# Patient Record
Sex: FEMALE | Race: WHITE | NOT HISPANIC OR LATINO | Employment: FULL TIME | URBAN - METROPOLITAN AREA
[De-identification: names, ages, dates, MRNs, and addresses within clinical notes are randomized per-mention and may not be internally consistent; named-entity substitution may affect disease eponyms.]

---

## 2017-02-25 ENCOUNTER — APPOINTMENT (OUTPATIENT)
Dept: GENERAL RADIOLOGY | Facility: CLINIC | Age: 27
End: 2017-02-25
Attending: EMERGENCY MEDICINE
Payer: MEDICAID

## 2017-02-25 ENCOUNTER — HOSPITAL ENCOUNTER (EMERGENCY)
Facility: CLINIC | Age: 27
Discharge: HOME | End: 2017-02-25
Attending: EMERGENCY MEDICINE | Admitting: EMERGENCY MEDICINE
Payer: MEDICAID

## 2017-02-25 VITALS
TEMPERATURE: 97.7 F | RESPIRATION RATE: 16 BRPM | WEIGHT: 180 LBS | DIASTOLIC BLOOD PRESSURE: 79 MMHG | OXYGEN SATURATION: 100 % | HEIGHT: 60 IN | BODY MASS INDEX: 35.34 KG/M2 | SYSTOLIC BLOOD PRESSURE: 146 MMHG

## 2017-02-25 DIAGNOSIS — M25.511 CHRONIC RIGHT SHOULDER PAIN: ICD-10-CM

## 2017-02-25 DIAGNOSIS — G89.29 CHRONIC RIGHT SHOULDER PAIN: ICD-10-CM

## 2017-02-25 PROCEDURE — 25000132 ZZH RX MED GY IP 250 OP 250 PS 637: Performed by: EMERGENCY MEDICINE

## 2017-02-25 PROCEDURE — 99283 EMERGENCY DEPT VISIT LOW MDM: CPT

## 2017-02-25 PROCEDURE — 73030 X-RAY EXAM OF SHOULDER: CPT | Mod: RT

## 2017-02-25 RX ORDER — TRAMADOL HYDROCHLORIDE 50 MG/1
50 TABLET ORAL ONCE
Status: COMPLETED | OUTPATIENT
Start: 2017-02-25 | End: 2017-02-25

## 2017-02-25 RX ORDER — LIDOCAINE 50 MG/G
PATCH TOPICAL
Qty: 10 PATCH | Refills: 0 | Status: SHIPPED | OUTPATIENT
Start: 2017-02-25 | End: 2019-03-25

## 2017-02-25 RX ADMIN — TRAMADOL HYDROCHLORIDE 50 MG: 50 TABLET, COATED ORAL at 07:59

## 2017-02-25 NOTE — LETTER
EMERGENCY DEPARTMENT  [address] [facility] [address]  [phone]    2017    Ashley Chauhan  [address]  [facility] [place] [address]  [phone] (home)     : 1990      To Whom it may concern:    Ashley Chauhan was seen in our Emergency Department today, 2017.      Sincerely,        Chivo Stevens

## 2017-02-25 NOTE — ED PROVIDER NOTES
History     Chief Complaint:  Shoulder pain    HPI   Ashley Chauhan is a 26 year old female with a history of chronic shoulder pain who presents with shoulder pain. The patient states that she has experienced right shoulder pain for a long time and has underwent physical therapy and received cortisone shots in the past with the last being in . Last night, the patient's shoulder pain became worse. She describes the pain as a shooting pain that radiates down her arm and into her neck. She states that the pain becomes worse when she begins her menstrual cycle and the patient just began her menstrual cycle this morning. She has been taking ibuprofen for the pain with the last dose at 0500. She has undergone imaging of the shoulder in the past. I looked the patient up in the [facility] database and did not find anything. The patient admits to marijuana use, but denies any other illicit drugs. No urinary or bowel incontinence. No left sided symptoms, other joint involvement, nor lower extremity pain.    Allergies:  Aspirin  Amoxicillin  Lortab  Penicillin    Medications:    Multivitamin  Valtrex    Past Medical History:    Chronic shoulder pain  Endometriosis    Past Surgical History:     section, laparoscopic cystectomy    Family History:    History reviewed. No pertinent family history.    Social History:  Marital Status:   Presents to the ED with a friend  Tobacco Use: 0.25 packs/day  Alcohol Use: positive     Review of Systems   Musculoskeletal:        Positive for right shoulder pain   All other systems reviewed and are negative.    Physical Exam   First Vitals:  BP: 146/79  Heart Rate: 109  Temp: 97.7  F (36.5  C)  Resp: 16  Height: 152.4 cm (5')  Weight: 81.6 kg (180 lb)  SpO2: 100 %    Physical Exam  General:  Well-nourished  Speaking in full sentences  Resting comfortably on gurney  Eyes:  Conjunctiva without injection or scleral icterus  PERRL  ENT:  Moist mucous membranes  Posterior  oropharynx clear without erythema or exudate  Nares patent  Pinnae normal  Neck:  Full ROM  No stiffness appreciated  Tenderness to palpation to R trapezius musculature  Resp:  Lungs CTAB  No crackles, wheezing or audible rubs  Good air movement  CV:   Normal rate, regular rhythm  S1 and S2 present  No murmur, gallop or rub  GI:  BS present  Abdomen soft without distention  Non-tender to light and deep palpation  No guarding or rebound tenderness  Skin:  Warm, dry, well perfused  No rashes or open wounds on exposed skin  No warmth, swelling, or redness to tissue about shoulder girdle  MSK:  No deformity noted about R shoulder  Able to flex/extend at shoulder, as well as internally/externally rotate  No limitation to flexion/extension at elbow  Extremity warm, well-perfused  2+ radial pulse  Cap refill <3 sec distally  Neuro:  Alert  4/5  strength on RUE vs LUE (chronic per patient)  Diminished sensation to RUE vs LUE (chronic per patient)  Answers questions appropriately  Gait stable  Psych:  Normal affect, normal mood    Emergency Department Course   Imaging:  Radiographic findings were communicated with the patient who voiced understanding of the findings.    Shoulder XR, G/E 3 views, right  No bony or soft tissue abnormalities.  Report per radiology.    Interventions:  (0759) Tramadol, 50 mg, PO    Emergency Department Course:  Nursing notes and vitals reviewed.  I performed an exam of the patient as documented above.   The patient was sent for a shoulder XR while in the emergency department, findings above.   (0903) I rechecked the patient and updated her on her results. The patient is feeling better.  Findings and plan explained to the patient. Patient discharged home with instructions regarding supportive care, medications, and reasons to return. The importance of close follow-up was reviewed. The patient was prescribed Lidoderm patches.    Impression & Plan    Medical Decision Making:  Sadie Chauhan is  a 26 year old female presenting to the emergency department for evaluation of right sided shoulder pain. Her vital signs on presentation reveal elevated blood pressure and heart rate though otherwise are grossly unremarkable. History is significant for pain to this location occurring regularly for the past 4 years, although exacerbated with occurrence of menstrual cycle. No recent history of trauma. X-ray negative for fracture, nor dislocation. No evidence of vascular compromise distally and no signs of compartment syndrome. She does have slight diminished  strength and sensation to the right upper extremity, although this is chronic and unchanged. Of note, she does have association of pain with menstrual cycle. Etiology of this not entirely clear. No current signs to suggest septic arthritis. No overlying skin changes consistent with cellulitis. There may be a component of referred cervical radiculopathy given some tenderness to palpation over the trapezius musculature. As this has been an ongoing issue for 4 years I do not feel that emergent MRI is indicated. No other signs or symptoms consistent with cord compression such as bowel or bladder incontinence, nor lower extremity weakness. Results of the above studies discussed with the patient. She was provided the above analgesia with improvements in symptoms. Clinical recommendations for further evaluation were discussed including referral to orthopedics, as well as ob/gyn. She was given a Lidoderm patch to assist with pain control and also referred to physical therapy. She is welcome to return to the ER at any point with worsening symptoms, trouble breathing, chest pain, abdominal pain, or any other concerns. All questions answered prior to discharge.    Diagnosis:    ICD-10-CM    1. Chronic right shoulder pain M25.511     G89.29        Disposition:  Discharge to home    Discharge Medications:  New Prescriptions    LIDOCAINE (LIDODERM) 5 % PATCH    Apply 1  patch to painful area for up to 12 h within a 24 h period.  Remove after 12 hours.       I, Joshua Jiang, am serving as a scribe on 2/25/2017 at 7:27 AM to personally document services performed by Dr. Stevens based on my observations and the provider's statements to me.        Chivo Stevens MD  02/25/17 0919

## 2017-02-25 NOTE — DISCHARGE INSTRUCTIONS
"Follow-up:  Please follow-up with [facility] in 3-5 days for re-evaluation and discussion of your visit to the emergency department today.    Home treatments:  Recommended home therapies include rest, ice, limited use of right shoulder, avoidance of heavy lifting, lidoderm patches, and physical therapy.    New prescriptions:  Lidoderm patch    Return precautions:  Warning signs which should prompt you to return to the ER include worsening pain, discoloration of your arm, or any other new or troubling symptoms.  We are always happy to see you again.        Neck/Back Pain: General    Both neck and back pain are usually caused by injury to the muscles or ligaments of the spine. Sometimes the disks that separate each bone of the spine may cause pain by pressing on a nearby nerve. Back and neck pain may appear after a sudden twisting/bending force (such as in a car accident), or sometimes after a simple awkward movement. In either case, muscle spasm is often present and adds to the pain.  Acute neck and back pain usually gets better in 1 to 2 weeks. Pain related to disk disease, arthritis in the spinal joints or spinal stenosis (narrowing of the spinal canal) can become chronic and last for months or years.  Back and neck pain are common problems. Most people feel better in 1 or 2 weeks, and most of the rest in 1 to 2 months. Most people can remain active.  Symptoms  People experience and describe pain differently.    Pain can be sharp, stabbing, shooting, aching, cramping, or burning    Movement, standing, bending, lifting, sitting, or walking may worsen the pain    Pain can be localized to one spot or area, or it can be more generalized    Pain can spread or radiate upwards, downwards, to the front, or go down your arms    Muscle spasm may occur.  Cause  Most of the time \"mechanical problems\" with the muscles or spine cause the pain. it is usually caused by an injury, whether known or not, to the " muscles or ligaments. While illnesses can cause back pain, it is usually not caused by a serious illness. Pain is usually related to physical activity, whether sports, exercise, work, or normal activity. Sometimes it can occur without an identifiable cause. This can happen simply by stretching or moving wrong, without noting pain at the time. Other causes include:    Overexertion, lifting, pushing, pulling incorrectly or too aggressively.    Sudden twisting, bending or stretching from an accident (car or fall), or accidental movement.    Poor posture    Poor conditioning, lack of regular exercise    Spinal disc disease or arthritis    Stress    Pregnancy, or illness like appendicitis, bladder or kidney infection, pelvic infections   Home care    For neck pain: Use a comfortable pillow that supports the head and keeps the spine in a neutral position. The position of the head should not be tilted forward or backward.    When in bed, try to find a position of comfort. A firm mattress is best. Try lying flat on your back with pillows under your knees. You can also try lying on your side with your knees bent up towards your chest and a pillow between your knees.    At first, do not try to stretch out the sore spots. If there is a strain, it is not like the good soreness you get after exercising without an injury. In this case, stretching may make it worse.    Avoid prolonged sitting, long car rides or travel. This puts more stress on the lower back than standing or walking.    During the first 24 to 72 hours after an injury, apply an ice pack to the painful area for 20 minutes and then remove it for 20 minutes over a period of 60 to 90 minutes or several times a day. As a safety precaution, do not use a heating pad at bedtime. Sleeping with a heating pad can lead to skin burns or tissue damage.    Ice and heat therapies can be alternated. Talk with your health care provider about the best treatment for your back or neck  pain.    Therapeutic massage can help relax the back and neck muscles without stretching them.    Be aware of safe lifting methods and do not lift anything over 15 pounds until all the pain is gone.  Medications  Talk to your health care provider before using medications, especially if you have other medical problems or are taking other medicines.    You may use acetaminophen (such as Tylenol) or ibuprofen (such as Advil or Motrin) to control pain, unless another pain medicine was prescribed. If you have chronic conditions like diabetes, liver or kidney disease, stomach ulcers,  gastrointestinal bleeding, or are taking blood thinner medications.    Be careful if you are given pain medicines, narcotics, or medication for muscle spasm. They can cause drowsiness, and can affect your coordination, reflexes, and judgment. Do not drive or operate heavy machinery.  Follow-up care  Follow up with your health care provider if your symptoms do not start to improve after one week. Physical therapy or further tests may be needed.  If X-rays were taken, they will be reviewed by a radiologist. You will be notified of any new findings that may affect your care.  Call 911  Seek emergency medical care if any of the following occur:    Trouble breathing    Confusion    Very drowsy or trouble awakening    Fainting or loss of consciousness    Rapid or very slow heart rate    Loss of bowel or bladder control  When to seek medical care  Get prompt medical attention if any of the following occur:    Pain becomes worse or spreads into your arms or legs    Weakness, numbness or pain in one or both arms or legs    Numbness in the groin area    Difficulty walking    Fever of 100.4 F (38 C) or higher, or as directed by your healthcare provider    4714-2319 [employer]. [address] [place] [address], [place] [address]. All rights reserved. This information is not intended as a substitute for professional medical care. Always follow your  healthcare professional's instructions.

## 2017-02-25 NOTE — ED AVS SNAPSHOT
Emergency Department    [address] [facility] [address]    Phone:  [phone]    Fax:  [phone]                                       Ashley Chauhan   MRN: [id number]    Department:   Emergency Department   Date of Visit:  2/25/2017           Patient Information     Date Of Birth          1990        Your diagnoses for this visit were:     Chronic right shoulder pain        You were seen by Chivo Stevens MD.      Follow-up Information     Follow up with [facility]. Schedule an appointment as soon as possible for a visit in 3 days.    Contact information:    [address] [place] [address]  [address]  [facility] [phone]        Follow up with [facility]. Schedule an appointment as soon as possible for a visit in 3 days.    Contact information:    [address] [place] [phone]        Follow up with  Emergency Department.    Specialty:  EMERGENCY MEDICINE    Why:  If symptoms worsen    Contact information:    [address] [facility] [address] [phone]        Follow up with [facility]. Schedule an appointment as soon as possible for a visit in 1 week.    Specialty:  Physical Therapy    Contact information:    [address] [facility] [address]  [facility] [address] [phone]        Discharge Instructions       Follow-up:  Please follow-up with [facility] in 3-5 days for re-evaluation and discussion of your visit to the emergency department today.    Home treatments:  Recommended home therapies include rest, ice, limited use of right shoulder, avoidance of heavy lifting, lidoderm patches, and physical therapy.    New prescriptions:  Lidoderm patch    Return precautions:  Warning signs which should prompt you to return to the ER include worsening pain, discoloration of your arm, or any other new or troubling symptoms.   "We are always happy to see you again.        Neck/Back Pain: General    Both neck and back pain are usually caused by injury to the muscles or ligaments of the spine. Sometimes the disks that separate each bone of the spine may cause pain by pressing on a nearby nerve. Back and neck pain may appear after a sudden twisting/bending force (such as in a car accident), or sometimes after a simple awkward movement. In either case, muscle spasm is often present and adds to the pain.  Acute neck and back pain usually gets better in 1 to 2 weeks. Pain related to disk disease, arthritis in the spinal joints or spinal stenosis (narrowing of the spinal canal) can become chronic and last for months or years.  Back and neck pain are common problems. Most people feel better in 1 or 2 weeks, and most of the rest in 1 to 2 months. Most people can remain active.  Symptoms  People experience and describe pain differently.    Pain can be sharp, stabbing, shooting, aching, cramping, or burning    Movement, standing, bending, lifting, sitting, or walking may worsen the pain    Pain can be localized to one spot or area, or it can be more generalized    Pain can spread or radiate upwards, downwards, to the front, or go down your arms    Muscle spasm may occur.  Cause  Most of the time \"mechanical problems\" with the muscles or spine cause the pain. it is usually caused by an injury, whether known or not, to the muscles or ligaments. While illnesses can cause back pain, it is usually not caused by a serious illness. Pain is usually related to physical activity, whether sports, exercise, work, or normal activity. Sometimes it can occur without an identifiable cause. This can happen simply by stretching or moving wrong, without noting pain at the time. Other causes include:    Overexertion, lifting, pushing, pulling incorrectly or too aggressively.    Sudden twisting, bending or stretching from an accident (car or fall), or accidental " movement.    Poor posture    Poor conditioning, lack of regular exercise    Spinal disc disease or arthritis    Stress    Pregnancy, or illness like appendicitis, bladder or kidney infection, pelvic infections   Home care    For neck pain: Use a comfortable pillow that supports the head and keeps the spine in a neutral position. The position of the head should not be tilted forward or backward.    When in bed, try to find a position of comfort. A firm mattress is best. Try lying flat on your back with pillows under your knees. You can also try lying on your side with your knees bent up towards your chest and a pillow between your knees.    At first, do not try to stretch out the sore spots. If there is a strain, it is not like the good soreness you get after exercising without an injury. In this case, stretching may make it worse.    Avoid prolonged sitting, long car rides or travel. This puts more stress on the lower back than standing or walking.    During the first 24 to 72 hours after an injury, apply an ice pack to the painful area for 20 minutes and then remove it for 20 minutes over a period of 60 to 90 minutes or several times a day. As a safety precaution, do not use a heating pad at bedtime. Sleeping with a heating pad can lead to skin burns or tissue damage.    Ice and heat therapies can be alternated. Talk with your health care provider about the best treatment for your back or neck pain.    Therapeutic massage can help relax the back and neck muscles without stretching them.    Be aware of safe lifting methods and do not lift anything over 15 pounds until all the pain is gone.  Medications  Talk to your health care provider before using medications, especially if you have other medical problems or are taking other medicines.    You may use acetaminophen (such as Tylenol) or ibuprofen (such as Advil or Motrin) to control pain, unless another pain medicine was prescribed. If you have chronic conditions  like diabetes, liver or kidney disease, stomach ulcers,  gastrointestinal bleeding, or are taking blood thinner medications.    Be careful if you are given pain medicines, narcotics, or medication for muscle spasm. They can cause drowsiness, and can affect your coordination, reflexes, and judgment. Do not drive or operate heavy machinery.  Follow-up care  Follow up with your health care provider if your symptoms do not start to improve after one week. Physical therapy or further tests may be needed.  If X-rays were taken, they will be reviewed by a radiologist. You will be notified of any new findings that may affect your care.  Call [phone]  Seek emergency medical care if any of the following occur:    Trouble breathing    Confusion    Very drowsy or trouble awakening    Fainting or loss of consciousness    Rapid or very slow heart rate    Loss of bowel or bladder control  When to seek medical care  Get prompt medical attention if any of the following occur:    Pain becomes worse or spreads into your arms or legs    Weakness, numbness or pain in one or both arms or legs    Numbness in the groin area    Difficulty walking    Fever of 100.4 F (38 C) or higher, or as directed by your healthcare provider    9197-5509 [employer]. [address] [place] [address]. All rights reserved. This information is not intended as a substitute for professional medical care. Always follow your healthcare professional's instructions.              24 Hour Appointment Hotline       To make an appointment at any [facility], call [phone] ([phone]). If you don't have a family doctor or clinic, we will help you find one. [facility] clinics are conveniently located to serve the needs of you and your family.             Review of your medicines      START taking        Dose / Directions Last dose taken    lidocaine 5 % Patch   Commonly known as:  LIDODERM   Quantity:  10 patch        Apply 1 patch to  painful area for up to 12 h within a 24 h period.  Remove after 12 hours.   Refills:  0          Our records show that you are taking the medicines listed below. If these are incorrect, please call your family doctor or clinic.        Dose / Directions Last dose taken    FLINSTONES GUMMIES OMEGA-3 DHA Chew        Refills:  0        UNABLE TO FIND        MEDICATION NAME: Oral contraceptive-unknown name   Refills:  0        valACYclovir 1000 mg tablet   Commonly known as:  VALTREX   Dose:  1000 mg   Quantity:  21 tablet        Take 1 tablet (1,000 mg) by mouth 3 times daily for 7 days   Refills:  0                Prescriptions were sent or printed at these locations (1 Prescription)                   Other Prescriptions                Printed at Department/Unit printer (1 of 1)         lidocaine (LIDODERM) 5 % Patch                Procedures and tests performed during your visit     Shoulder XR, G/E 3 views, right      Orders Needing Specimen Collection     None      Pending Results     No orders found from 2/23/2017 to 2/26/2017.            Pending Culture Results     No orders found from 2/23/2017 to 2/26/2017.             Test Results from your hospital stay     2/25/2017  8:25 AM - Interface, Radiant Ib      Narrative     XR SHOULDER RT G/E 3 VW 2/25/2017 7:59 AM     HISTORY: Shoulder pain.    COMPARISON: None.        Impression     IMPRESSION: No bony or soft tissue abnormalities.    PURVI OVALLE MD                Clinical Quality Measure: Blood Pressure Screening     Your blood pressure was checked while you were in the emergency department today. The last reading we obtained was  BP: 146/79 . Please read the guidelines below about what these numbers mean and what you should do about them.  If your systolic blood pressure (the top number) is less than 120 and your diastolic blood pressure (the bottom number) is less than 80, then your blood pressure is normal. There is nothing more that you need to do about  "it.  If your systolic blood pressure (the top number) is 120-139 or your diastolic blood pressure (the bottom number) is 80-89, your blood pressure may be higher than it should be. You should have your blood pressure rechecked within a year by a primary care provider.  If your systolic blood pressure (the top number) is 140 or greater or your diastolic blood pressure (the bottom number) is 90 or greater, you may have high blood pressure. High blood pressure is treatable, but if left untreated over time it can put you at risk for heart attack, stroke, or kidney failure. You should have your blood pressure rechecked by a primary care provider within the next 4 weeks.  If your provider in the emergency department today gave you specific instructions to follow-up with your doctor or provider even sooner than that, you should follow that instruction and not wait for up to 4 weeks for your follow-up visit.        Thank you for choosing [facility]       Thank you for choosing [facility] for your care. Our goal is always to provide you with excellent care. Hearing back from our patients is one way we can continue to improve our services. Please take a few minutes to complete the written survey that you may receive in the mail after you visit with us. Thank you!        [employer] Information     [employer] lets you send messages to your doctor, view your test results, renew your prescriptions, schedule appointments and more. To sign up, go to [email or web address] . Click on \"Log in\" on the left side of the screen, which will take you to the Welcome page. Then click on \"Sign up Now\" on the right side of the page.     You will be asked to enter the access code listed below, as well as some personal information. Please follow the directions to create your username and password.     Your access code is: [email or web address]  Expires: 2017  9:15 AM     Your access code will  in 90 days. If you need help or a new code, please call " your [place] clinic or [phone].        Care EveryWhere ID     This is your Care EveryWhere ID. This could be used by other organizations to access your [facility] medical records  [id number]        After Visit Summary       This is your record. Keep this with you and show to your community pharmacist(s) and doctor(s) at your next visit.

## 2017-03-26 ENCOUNTER — HOSPITAL ENCOUNTER (EMERGENCY)
Facility: CLINIC | Age: 27
Discharge: HOME | End: 2017-03-26
Attending: EMERGENCY MEDICINE | Admitting: EMERGENCY MEDICINE
Payer: MEDICAID

## 2017-03-26 ENCOUNTER — APPOINTMENT (OUTPATIENT)
Dept: GENERAL RADIOLOGY | Facility: CLINIC | Age: 27
End: 2017-03-26
Attending: EMERGENCY MEDICINE
Payer: MEDICAID

## 2017-03-26 VITALS
HEIGHT: 60 IN | SYSTOLIC BLOOD PRESSURE: 128 MMHG | OXYGEN SATURATION: 98 % | RESPIRATION RATE: 15 BRPM | HEART RATE: 60 BPM | DIASTOLIC BLOOD PRESSURE: 82 MMHG | TEMPERATURE: 98.1 F

## 2017-03-26 DIAGNOSIS — J02.0 STREPTOCOCCAL SORE THROAT: ICD-10-CM

## 2017-03-26 LAB
ALBUMIN SERPL-MCNC: 3.7 G/DL (ref 3.4–5)
ALP SERPL-CCNC: 70 U/L (ref 40–150)
ALT SERPL W P-5'-P-CCNC: 19 U/L (ref 0–50)
ANION GAP SERPL CALCULATED.3IONS-SCNC: 12 MMOL/L (ref 3–14)
AST SERPL W P-5'-P-CCNC: 21 U/L (ref 0–45)
BILIRUB SERPL-MCNC: 0.5 MG/DL (ref 0.2–1.3)
BUN SERPL-MCNC: 10 MG/DL (ref 7–30)
CALCIUM SERPL-MCNC: 8.8 MG/DL (ref 8.5–10.1)
CHLORIDE SERPL-SCNC: 107 MMOL/L (ref 94–109)
CO2 SERPL-SCNC: 22 MMOL/L (ref 20–32)
CREAT SERPL-MCNC: 0.7 MG/DL (ref 0.52–1.04)
DEPRECATED S PYO AG THROAT QL EIA: ABNORMAL
ERYTHROCYTE [DISTWIDTH] IN BLOOD BY AUTOMATED COUNT: 12.6 % (ref 10–15)
FLUAV+FLUBV AG SPEC QL: NEGATIVE
FLUAV+FLUBV AG SPEC QL: NORMAL
GFR SERPL CREATININE-BSD FRML MDRD: NORMAL ML/MIN/1.7M2
GLUCOSE SERPL-MCNC: 73 MG/DL (ref 70–99)
HCG SERPL QL: NEGATIVE
HCT VFR BLD AUTO: 43.7 % (ref 35–47)
HGB BLD-MCNC: 15.6 G/DL (ref 11.7–15.7)
LIPASE SERPL-CCNC: 60 U/L (ref 73–393)
MCH RBC QN AUTO: 32.4 PG (ref 26.5–33)
MCHC RBC AUTO-ENTMCNC: 35.7 G/DL (ref 31.5–36.5)
MCV RBC AUTO: 91 FL (ref 78–100)
MICRO REPORT STATUS: ABNORMAL
PLATELET # BLD AUTO: 174 10E9/L (ref 150–450)
POTASSIUM SERPL-SCNC: 4 MMOL/L (ref 3.4–5.3)
PROT SERPL-MCNC: 7.4 G/DL (ref 6.8–8.8)
RBC # BLD AUTO: 4.81 10E12/L (ref 3.8–5.2)
SODIUM SERPL-SCNC: 141 MMOL/L (ref 133–144)
SPECIMEN SOURCE: ABNORMAL
SPECIMEN SOURCE: NORMAL
WBC # BLD AUTO: 5.4 10E9/L (ref 4–11)

## 2017-03-26 PROCEDURE — 96361 HYDRATE IV INFUSION ADD-ON: CPT

## 2017-03-26 PROCEDURE — 96375 TX/PRO/DX INJ NEW DRUG ADDON: CPT

## 2017-03-26 PROCEDURE — 99284 EMERGENCY DEPT VISIT MOD MDM: CPT | Mod: 25

## 2017-03-26 PROCEDURE — 84703 CHORIONIC GONADOTROPIN ASSAY: CPT | Performed by: EMERGENCY MEDICINE

## 2017-03-26 PROCEDURE — 85027 COMPLETE CBC AUTOMATED: CPT | Performed by: EMERGENCY MEDICINE

## 2017-03-26 PROCEDURE — 87804 INFLUENZA ASSAY W/OPTIC: CPT | Mod: 91 | Performed by: EMERGENCY MEDICINE

## 2017-03-26 PROCEDURE — 96374 THER/PROPH/DIAG INJ IV PUSH: CPT

## 2017-03-26 PROCEDURE — 83690 ASSAY OF LIPASE: CPT | Performed by: EMERGENCY MEDICINE

## 2017-03-26 PROCEDURE — 71020 XR CHEST 2 VW: CPT

## 2017-03-26 PROCEDURE — 80053 COMPREHEN METABOLIC PANEL: CPT | Performed by: EMERGENCY MEDICINE

## 2017-03-26 PROCEDURE — 87880 STREP A ASSAY W/OPTIC: CPT | Performed by: EMERGENCY MEDICINE

## 2017-03-26 PROCEDURE — 25000128 H RX IP 250 OP 636: Performed by: EMERGENCY MEDICINE

## 2017-03-26 RX ORDER — ONDANSETRON 4 MG/1
4 TABLET, ORALLY DISINTEGRATING ORAL EVERY 8 HOURS PRN
Qty: 10 TABLET | Refills: 0 | Status: SHIPPED | OUTPATIENT
Start: 2017-03-26 | End: 2017-03-29

## 2017-03-26 RX ORDER — AZITHROMYCIN 500 MG/1
500 TABLET, FILM COATED ORAL DAILY
Qty: 5 TABLET | Refills: 0 | Status: SHIPPED | OUTPATIENT
Start: 2017-03-26 | End: 2017-03-31

## 2017-03-26 RX ORDER — ONDANSETRON 2 MG/ML
4 INJECTION INTRAMUSCULAR; INTRAVENOUS ONCE
Status: COMPLETED | OUTPATIENT
Start: 2017-03-26 | End: 2017-03-26

## 2017-03-26 RX ORDER — KETOROLAC TROMETHAMINE 15 MG/ML
15 INJECTION, SOLUTION INTRAMUSCULAR; INTRAVENOUS ONCE
Status: COMPLETED | OUTPATIENT
Start: 2017-03-26 | End: 2017-03-26

## 2017-03-26 RX ADMIN — SODIUM CHLORIDE 1000 ML: 9 INJECTION, SOLUTION INTRAVENOUS at 17:41

## 2017-03-26 RX ADMIN — KETOROLAC TROMETHAMINE 15 MG: 15 INJECTION, SOLUTION INTRAMUSCULAR; INTRAVENOUS at 18:19

## 2017-03-26 RX ADMIN — ONDANSETRON 4 MG: 2 SOLUTION INTRAMUSCULAR; INTRAVENOUS at 18:19

## 2017-03-26 ASSESSMENT — ENCOUNTER SYMPTOMS
NAUSEA: 1
VOMITING: 1
DIARRHEA: 1
CHILLS: 1
DIZZINESS: 1
ABDOMINAL PAIN: 1
RHINORRHEA: 1
SORE THROAT: 1
HEADACHES: 1
FEVER: 1
DIAPHORESIS: 1
COUGH: 1

## 2017-03-26 NOTE — LETTER
EMERGENCY DEPARTMENT  [address] [facility] [address]  [phone]    2017    Ashley Chauhan  [address]  [facility] [address]  [phone] (home)     : 1990      To Whom it may concern:    Ashley Chauhan was seen in our Emergency Department today, 2017. She can return to work on 2017.    Sincerely,        Nathaly Wright

## 2017-03-26 NOTE — ED PROVIDER NOTES
"  History     Chief Complaint:  Nausea, vomiting, diarrhea     HPI   Ashley Chauhan is a 27 year old female who presents for evaluation of multiple complaints. The patient reports approximately 3 days of fevers, chills, nausea, vomiting, diarrhea, sore throat, cough, rhinorrhea, headache, dizziness with standing up and left sided abdominal pain. The patient was unable to keep down her Tramadol today. Patient also notes vaginal bleeding and is currently on her period. There are several ill members in her household. The patient also states that she was in contact with a person who was in [place] for 6 months and recently returned who has developed a rash and \"blood pressure issues\" since returning. The patient does not report any other symptoms.     Allergies:  Aspirin  Amoxicillin  Lortab [Hydrocodone-Acetaminophen]  Penicillin G     Medications:    Tramadol  Prednisone  Flexeril    Past Medical History:    Chronic shoulder pain  Chronic leg pain    Past Surgical History:        Family History:    No family history on file.    Social History:  Marital Status:     Smoking status: Current smoker  Alcohol use: Yes   Presents to the ED with       Review of Systems   Constitutional: Positive for chills, diaphoresis and fever.   HENT: Positive for rhinorrhea and sore throat.    Respiratory: Positive for cough.    Gastrointestinal: Positive for abdominal pain, diarrhea, nausea and vomiting.   Genitourinary: Positive for vaginal bleeding.   Neurological: Positive for dizziness and headaches.   All other systems reviewed and are negative.      Physical Exam     Patient Vitals for the past 24 hrs:   BP Temp Temp src Pulse Heart Rate Resp SpO2 Height   17 1933 - - - 60 - 15 98 % -   17 1922 128/82 - - 60 - 16 98 % -   17 1659 (!) 136/91 98.1  F (36.7  C) Oral - 105 20 100 % 1.524 m (5')      Physical Exam  General: Resting comfortably on the gurney  Eyes:  The pupils are equal and " round    Conjunctivae and sclerae are normal  ENT:    The nose is normal    Pinnae are normal    Mild posterior oropharynx erythema    Uvula midline    No swelling  Neck:  Normal range of motion    No neck stiffness  CV:  Tachycardic rate and rhythm    Skin warm and well perfused   Resp:  Lungs are clear    Non-labored    No rales    No wheezing   GI:  Abdomen is soft, there is no rigidity    Mild left sided abdominal tenderness    No right sided tenderness     No distension    No rebound tenderness   MS:  Normal muscular tone  Skin:  No rash or acute skin lesions noted  Neuro:   Awake, alert.      Speech is normal and fluent.    Face is symmetric.     Moves all extremities  Psych:  Normal affect.  Appropriate interactions.     Emergency Department Course     Imaging:  Radiographic findings were communicated with the patient who voiced understanding of the findings.    XR Chest PA & LAT  IMPRESSION: No acute disease. Lungs clear, heart and pulmonary vessels  within normal limits. No evidence for pleural effusion.   Report per radiology.     Laboratory:  Rapid strep: positive  Influenza A/B Antigen: Influenza A negative, Influenza B negative     CBC:  WBC 5.4, HGB 15.6, , otherwise WNL  CMP:   AWNL (Creatinine 0.70)  Lipase: 60 (L)  HCG: negative    Interventions:  (1819) Normal Saline, 1 liter, IV bolus   (1819) Zofran 4 mg IV  (1819) Toradol 15 mg IV    Emergency Department Course:  Nursing notes and vitals reviewed.  (1719) I performed an exam of the patient as documented above.    Blood was drawn from the patient. This was sent for laboratory testing, findings above.   The patient was sent for a chest xray while in the emergency department, findings above.       (1919) Patient update. Findings and plan explained to the patient. Patient discharged home with instructions regarding supportive care, medications, and reasons to return. The importance of close follow-up was reviewed. The patient was prescribed  Azithromycin and Zofran.      Impression & Plan      Medical Decision Making:  Ashley Chauhan is a 27 year old female who presents to the ED with multiple symptoms. Vital signs initially noted for tachycardia but this resolved. Patient was given IV fluids, antiemetics and Toradol and she had improvement in her symptoms. Pregnancy and influenza are negative but rapid strep is positive. Chest xray is clear. She was able to tolerate oral intake here in the ED and will treat for strep pharyngitis. She had minimal left sided abdominal tenderness on exam with no vomiting in ED after trial of oral intake. I do not think that she requires imaging of her abdomen given mild tenderness. Reasons to return to the ED were discussed with the patient.     Diagnosis:    ICD-10-CM   1. Streptococcal sore throat J02.0       Disposition:  Patient is discharged to home.       Discharge Medication List as of 3/26/2017  7:24 PM      START taking these medications    Details   azithromycin (ZITHROMAX) 500 MG tablet Take 1 tablet (500 mg) by mouth daily for 5 days, Disp-5 tablet, R-0, Local Print      ondansetron (ZOFRAN ODT) 4 MG ODT tab Take 1 tablet (4 mg) by mouth every 8 hours as needed, Disp-10 tablet, R-0, Local Print             Will Luciano  3/26/2017    EMERGENCY DEPARTMENT    I, Will Luciano, am serving as a scribe on 3/26/2017 at 5:19 PM to personally document services performed by Dr. Wright based on my observations and the provider's statements to me.       Nathaly Wright MD  03/27/17 0215

## 2017-03-26 NOTE — ED AVS SNAPSHOT
Emergency Department    [address] [facility] [address]    Phone:  [phone]    Fax:  [phone]                                       Ashley Chauhan   MRN: [id number]    Department:   Emergency Department   Date of Visit:  3/26/2017           Patient Information     Date Of Birth          1990        Your diagnoses for this visit were:     Streptococcal sore throat        You were seen by Nathaly Wright MD.      Follow-up Information     Follow up with [facility], [facility].    Contact information:    [address] [facility] [id number] [phone]          Follow up with  Emergency Department.    Specialty:  EMERGENCY MEDICINE    Why:  If symptoms worsen    Contact information:    [address] [facility] [address] [phone]        Discharge Instructions       Start the antibiotics for strep throat    Discharge Instructions  Sore Throat  You were seen today for a sore throat.  Most sore throats are caused by a virus. Antibiotics do not help with viral infections, but you can fight off the virus on your own.  In this case, your sore throat would be treated with medications for your pain and fever.    Strep throat is a kind of sore throat caused by Group A streptococcus bacteria.  This type of sore throat is treated with antibiotics.  If you had a rapid test done today for strep throat and it did not show infection, we always do a culture. If the culture shows you have strep throat, we will call you and get you a prescription for antibiotics.    Return to the Emergency Department if:    If you have difficulty breathing.    If you are drooling because you are unable to swallow.    You become dehydrated due to difficulty drinking. Signs of dehydration include weakness, dry mouth, and urinating less than 3 times per day.    If you develop swelling of the neck or tongue.    If you develop a high fever with either headache or stiff  "neck.    Treatment:      Pain relief -- Non-prescription pain medications, such as Tylenol  (acetaminophen) or Motrin , Advil  (ibuprofen) are usually recommended for pain.  Do not use a medicine that you are allergic to, or if your doctor has told you not to use it.   If you have been given a narcotic such as Vicodin  (hydrocodone with acetaminophen), Percocet  (oxycodone with acetaminophen), codeine, do not drive for four hours after you have taken it.  If the narcotic contains Tylenol  (acetaminophen), do not take Tylenol  with it. All narcotics will cause constipation, so eat a high fiber diet.      If you have been placed on antibiotics, watch for signs of allergic reaction.  These include rash, lip swelling, difficulty breathing, wheezing, and dizziness.  If you develop any of these symptoms, stop the antibiotic immediately and go to an Emergency Department or Urgent Care for evaluation.    Probiotics: If you have been given an antibiotic, you may want to also take a probiotic pill or eat yogurt with live cultures. Probiotics have \"good bacteria\" to help your intestines stay healthy. Studies have shown that probiotics help prevent diarrhea and other intestine problems (including C. diff infection) when you take antibiotics. You can buy these without a prescription in the pharmacy section of the store.   If you were given a prescription for medicine here today, be sure to read all of the information (including the package insert) that comes with your prescription.  This will include important information about the medicine, its side effects, and any warnings that you need to know about.  The pharmacist who fills the prescription can provide more information and answer questions you may have about the medicine.  If you have questions or concerns that the pharmacist cannot address, please call or return to the Emergency Department.           Remember that you can always come back to the Emergency Department if " you are not able to see your regular doctor in the amount of time listed above, if you get any new symptoms, or if there is anything that worries you.          24 Hour Appointment Hotline       To make an appointment at any [facility] clinic, call [phone] ([phone]). If you don't have a family doctor or clinic, we will help you find one. [facility] clinics are conveniently located to serve the needs of you and your family.             Review of your medicines      START taking        Dose / Directions Last dose taken    azithromycin 500 MG tablet   Commonly known as:  ZITHROMAX   Dose:  500 mg   Quantity:  5 tablet        Take 1 tablet (500 mg) by mouth daily for 5 days   Refills:  0        ondansetron 4 MG ODT tab   Commonly known as:  ZOFRAN ODT   Dose:  4 mg   Quantity:  10 tablet        Take 1 tablet (4 mg) by mouth every 8 hours as needed   Refills:  0          Our records show that you are taking the medicines listed below. If these are incorrect, please call your family doctor or clinic.        Dose / Directions Last dose taken    ALBUTEROL IN        Refills:  0        FLEXERIL PO        Refills:  0        FLINSTONES GUMMIES OMEGA-3 DHA Chew        Refills:  0        lidocaine 5 % Patch   Commonly known as:  LIDODERM   Quantity:  10 patch        Apply 1 patch to painful area for up to 12 h within a 24 h period.  Remove after 12 hours.   Refills:  0        PREDNISONE PO        Refills:  0        TRAMADOL HCL PO   Dose:  50 mg        Take 50 mg by mouth   Refills:  0        UNABLE TO FIND        MEDICATION NAME: Oral contraceptive-unknown name   Refills:  0        valACYclovir 1000 mg tablet   Commonly known as:  VALTREX   Dose:  1000 mg   Quantity:  21 tablet        Take 1 tablet (1,000 mg) by mouth 3 times daily for 7 days   Refills:  0                Prescriptions were sent or printed at these locations (2 Prescriptions)                   Other Prescriptions                Printed at Department/Unit  printer (2 of 2)         azithromycin (ZITHROMAX) 500 MG tablet               ondansetron (ZOFRAN ODT) 4 MG ODT tab                Procedures and tests performed during your visit     CBC (+ platelets, no diff)    Chest XR,  PA & LAT    Comprehensive metabolic panel    HCG QUALitative pregnancy (blood)    Influenza A/B antigen    Lipase    Rapid strep screen      Orders Needing Specimen Collection     None      Pending Results     No orders found from 3/24/2017 to 3/27/2017.            Pending Culture Results     No orders found from 3/24/2017 to 3/27/2017.             Test Results from your hospital stay     3/26/2017  6:05 PM - Interface, Flexilab Results      Component Results     Component Value Ref Range & Units Status    WBC 5.4 4.0 - 11.0 10e9/L Final    RBC Count 4.81 3.8 - 5.2 10e12/L Final    Hemoglobin 15.6 11.7 - 15.7 g/dL Final    Hematocrit 43.7 35.0 - 47.0 % Final    MCV 91 78 - 100 fl Final    MCH 32.4 26.5 - 33.0 pg Final    MCHC 35.7 31.5 - 36.5 g/dL Final    RDW 12.6 10.0 - 15.0 % Final    Platelet Count 174 150 - 450 10e9/L Final         3/26/2017  7:05 PM - Interface, Flexilab Results      Component Results     Component Value Ref Range & Units Status    Sodium 141 133 - 144 mmol/L Final    Potassium 4.0 3.4 - 5.3 mmol/L Final    Chloride 107 94 - 109 mmol/L Final    Carbon Dioxide 22 20 - 32 mmol/L Final    Anion Gap 12 3 - 14 mmol/L Final    Glucose 73 70 - 99 mg/dL Final    Urea Nitrogen 10 7 - 30 mg/dL Final    Creatinine 0.70 0.52 - 1.04 mg/dL Final    GFR Estimate >90  Non  GFR Calc   >60 mL/min/1.7m2 Final    GFR Estimate If Black >90   GFR Calc   >60 mL/min/1.7m2 Final    Calcium 8.8 8.5 - 10.1 mg/dL Final    Bilirubin Total 0.5 0.2 - 1.3 mg/dL Final    Albumin 3.7 3.4 - 5.0 g/dL Final    Protein Total 7.4 6.8 - 8.8 g/dL Final    Alkaline Phosphatase 70 40 - 150 U/L Final    ALT 19 0 - 50 U/L Final    AST 21 0 - 45 U/L Final         3/26/2017  7:05 PM -  Interface, Flexilab Results      Component Results     Component Value Ref Range & Units Status    Lipase 60 (L) 73 - 393 U/L Final         3/26/2017  6:12 PM - Interface, Flexilab Results      Component Results     Component Value Ref Range & Units Status    HCG Qualitative Serum Negative NEG Final         3/26/2017  6:11 PM - Interface, Flexilab Results      Component Results     Component    Specimen Description    Throat    Rapid Strep A Screen (Abnormal)    POSITIVE: Group A Streptococcal antigen detected by immunoassay.    Micro Report Status    FINAL 03/26/2017         3/26/2017  6:22 PM - Interface, Flexilab Results      Component Results     Component Value Ref Range & Units Status    Influenza A/B Agn Specimen Nasopharyngeal  Final    Influenza A Negative NEG Final    Influenza B  NEG Final    Negative   Test results must be correlated with clinical data. If necessary, results   should be confirmed by a molecular assay or viral culture.           3/26/2017  6:03 PM - Interface, Radiant Ib      Narrative     CHEST TWO VIEWS 3/26/2017 5:53 PM     HISTORY: cough, fever    COMPARISON: None.        Impression     IMPRESSION: No acute disease. Lungs clear, heart and pulmonary vessels  within normal limits. No evidence for pleural effusion.     GUSTABO STEWARD MD                Clinical Quality Measure: Blood Pressure Screening     Your blood pressure was checked while you were in the emergency department today. The last reading we obtained was  BP: 128/82 . Please read the guidelines below about what these numbers mean and what you should do about them.  If your systolic blood pressure (the top number) is less than 120 and your diastolic blood pressure (the bottom number) is less than 80, then your blood pressure is normal. There is nothing more that you need to do about it.  If your systolic blood pressure (the top number) is 120-139 or your diastolic blood pressure (the bottom number) is 80-89, your blood  "pressure may be higher than it should be. You should have your blood pressure rechecked within a year by a primary care provider.  If your systolic blood pressure (the top number) is 140 or greater or your diastolic blood pressure (the bottom number) is 90 or greater, you may have high blood pressure. High blood pressure is treatable, but if left untreated over time it can put you at risk for heart attack, stroke, or kidney failure. You should have your blood pressure rechecked by a primary care provider within the next 4 weeks.  If your provider in the emergency department today gave you specific instructions to follow-up with your doctor or provider even sooner than that, you should follow that instruction and not wait for up to 4 weeks for your follow-up visit.        Thank you for choosing [facility]       Thank you for choosing [facility] for your care. Our goal is always to provide you with excellent care. Hearing back from our patients is one way we can continue to improve our services. Please take a few minutes to complete the written survey that you may receive in the mail after you visit with us. Thank you!        [employer] Information     [employer] lets you send messages to your doctor, view your test results, renew your prescriptions, schedule appointments and more. To sign up, go to [email or web address] . Click on \"Log in\" on the left side of the screen, which will take you to the Welcome page. Then click on \"Sign up Now\" on the right side of the page.     You will be asked to enter the access code listed below, as well as some personal information. Please follow the directions to create your username and password.     Your access code is: [email or web address]  Expires: 2017 10:15 AM     Your access code will  in 90 days. If you need help or a new code, please call your [facility] clinic or [phone].        Care EveryWhere ID     This is your Care EveryWhere ID. This could be used by other organizations " to access your [facility] medical records  [id number]        After Visit Summary       This is your record. Keep this with you and show to your community pharmacist(s) and doctor(s) at your next visit.

## 2017-03-26 NOTE — ED AVS SNAPSHOT
Emergency Department    [address] [facility] [address]    Phone:  [phone]    Fax:  [phone]                                       Ashley Chauhan   MRN: [id number]    Department:   Emergency Department   Date of Visit:  3/26/2017           After Visit Summary Signature Page     I have received my discharge instructions, and my questions have been answered. I have discussed any challenges I see with this plan with the nurse or doctor.    ..........................................................................................................................................  Patient/Patient Representative Signature      ..........................................................................................................................................  Patient Representative Print Name and Relationship to Patient    ..................................................               ................................................  Date                                            Time    ..........................................................................................................................................  Reviewed by Signature/Title    ...................................................              ..............................................  Date                                                            Time

## 2017-03-27 NOTE — ED NOTES
Offered water per request, no other needs at this time.  Vanessa ARCE,.......................................... 3/26/2017   7:21 PM

## 2017-03-27 NOTE — DISCHARGE INSTRUCTIONS
Start the antibiotics for strep throat    Discharge Instructions  Sore Throat  You were seen today for a sore throat.  Most sore throats are caused by a virus. Antibiotics do not help with viral infections, but you can fight off the virus on your own.  In this case, your sore throat would be treated with medications for your pain and fever.    Strep throat is a kind of sore throat caused by Group A streptococcus bacteria.  This type of sore throat is treated with antibiotics.  If you had a rapid test done today for strep throat and it did not show infection, we always do a culture. If the culture shows you have strep throat, we will call you and get you a prescription for antibiotics.    Return to the Emergency Department if:    If you have difficulty breathing.    If you are drooling because you are unable to swallow.    You become dehydrated due to difficulty drinking. Signs of dehydration include weakness, dry mouth, and urinating less than 3 times per day.    If you develop swelling of the neck or tongue.    If you develop a high fever with either headache or stiff neck.    Treatment:      Pain relief -- Non-prescription pain medications, such as Tylenol  (acetaminophen) or Motrin , Advil  (ibuprofen) are usually recommended for pain.  Do not use a medicine that you are allergic to, or if your doctor has told you not to use it.   If you have been given a narcotic such as Vicodin  (hydrocodone with acetaminophen), Percocet  (oxycodone with acetaminophen), codeine, do not drive for four hours after you have taken it.  If the narcotic contains Tylenol  (acetaminophen), do not take Tylenol  with it. All narcotics will cause constipation, so eat a high fiber diet.      If you have been placed on antibiotics, watch for signs of allergic reaction.  These include rash, lip swelling, difficulty breathing, wheezing, and dizziness.  If you develop any of these symptoms, stop the antibiotic immediately and go to an  "Emergency Department or Urgent Care for evaluation.    Probiotics: If you have been given an antibiotic, you may want to also take a probiotic pill or eat yogurt with live cultures. Probiotics have \"good bacteria\" to help your intestines stay healthy. Studies have shown that probiotics help prevent diarrhea and other intestine problems (including C. diff infection) when you take antibiotics. You can buy these without a prescription in the pharmacy section of the store.   If you were given a prescription for medicine here today, be sure to read all of the information (including the package insert) that comes with your prescription.  This will include important information about the medicine, its side effects, and any warnings that you need to know about.  The pharmacist who fills the prescription can provide more information and answer questions you may have about the medicine.  If you have questions or concerns that the pharmacist cannot address, please call or return to the Emergency Department.           Remember that you can always come back to the Emergency Department if you are not able to see your regular doctor in the amount of time listed above, if you get any new symptoms, or if there is anything that worries you.        "

## 2017-11-02 ENCOUNTER — HOSPITAL ENCOUNTER (EMERGENCY)
Facility: CLINIC | Age: 27
Discharge: HOME | End: 2017-11-02
Attending: EMERGENCY MEDICINE | Admitting: EMERGENCY MEDICINE
Payer: COMMERCIAL

## 2017-11-02 VITALS
DIASTOLIC BLOOD PRESSURE: 71 MMHG | WEIGHT: 179 LBS | SYSTOLIC BLOOD PRESSURE: 125 MMHG | TEMPERATURE: 97.5 F | HEIGHT: 60 IN | BODY MASS INDEX: 35.14 KG/M2 | OXYGEN SATURATION: 99 % | RESPIRATION RATE: 16 BRPM

## 2017-11-02 DIAGNOSIS — R21 RASH: ICD-10-CM

## 2017-11-02 PROCEDURE — 99282 EMERGENCY DEPT VISIT SF MDM: CPT

## 2017-11-02 RX ORDER — CIPROFLOXACIN 250 MG/1
250 TABLET, FILM COATED ORAL 2 TIMES DAILY
Qty: 14 TABLET | Refills: 0 | Status: SHIPPED | OUTPATIENT
Start: 2017-11-02 | End: 2017-11-09

## 2017-11-02 ASSESSMENT — ENCOUNTER SYMPTOMS
VOMITING: 0
DIARRHEA: 0
COUGH: 0
SHORTNESS OF BREATH: 1
FEVER: 0

## 2017-11-02 NOTE — ED AVS SNAPSHOT
Emergency Department    [address] [facility] [address]    Phone:  [phone]    Fax:  [phone]                                       Ashley Chauhan   MRN: [id number]    Department:   Emergency Department   Date of Visit:  11/2/2017           Patient Information     Date Of Birth          1990        Your diagnoses for this visit were:     Rash        You were seen by Dary Oseguera MD.      Follow-up Information     Follow up with [facility]. Schedule an appointment as soon as possible for a visit in 3 days.    Contact information:    [address] [facility] [id number] [phone]          Discharge Instructions       *You may resume diet and activities as tolerated.  *Take medications as prescribed.  Ciprofloxacin for possible folliculitis. Ibuprofen and/or tylenol for pain. Continue your current medications.  Stop taking cipro if you develop joint pain or tendon pain.  *Follow-up with your doctor for a recheck in 2-3 days.  Call earlier if you develop blisters like shingles.  *Return if you develop high fever, vomiting, faint or feel like you will faint or become worse in any way.      Folliculitis  Folliculitis is an inflammation of a hair follicle. A hair follicle is the little pocket where a hair grows out of the skin. Bacteria normally live on the skin. But sometimes bacteria can get trapped in a follicle and cause infection. This causes a bumpy rash. The area over the follicles is red and raised. It may itch or be painful. The bumps may have fluid (pus) inside. The pus may leak and then form crusts. Sores can spread to other areas of the body. Once it goes away, folliculitis can come back at any time. Severe cases may cause permanent hair loss and scarring.  Folliculitis can happen anywhere on the body where hair grows. It can be caused by rubbing from tight clothing. Ingrown hairs can cause it. Soaking in a hot tub or swimming pool that has  bacteria in the water can cause it. It may also occur if a hair follicle is blocked by a bandage.  Sores often go away in a few days with no treatment. In some cases, medicine may be given. A small piece of skin or pus may be taken to find the type of bacteria causing the infection.  Home care  The healthcare provider may prescribe an antibiotic cream or ointment.  Oral antibiotics may also be prescribed. Or you may be told to use an over-the-counter antibiotic cream. Follow all instructions when using any of these medicines.  General care:    Apply warm, moist compresses to the sores for 20 minutes up to 3 times a day. You can make a compress by soaking a cloth in warm water. Squeeze out excess water.    Don t cut, poke, or squeeze the sores. This can be painful and spread infection.    Don t scratch the affected area. Scratching can delay healing.    Don t shave the areas affected by folliculitis.    If the sores leak fluid, cover the area with a nonstick gauze bandage. Use as little tape as possible. Carefully discard all soiled bandages.    Dress in loose cotton clothing.    Change sheets and blankets if they are soiled by pus. Wash all clothes, towels, sheets, and cloth diapers in soap and hot water. Do not share clothes, towels, or sheets with other family members.    Do not soak the sores in bath water. This can spread infection. Instead, keep the area clean by gently washing sores with soap and warm water.    Wash your hands or use antibacterial gels often to prevent spreading the bacteria.  Follow-up care  Follow up with your healthcare provider, or as advised.  When to seek medical advice  Call your healthcare provider right away if any of these occur:    Fever of 100.4 F (38 C) or higher    Spreading of the rash    Rash does not get better with treatment    Redness or swelling that gets worse    Rash becomes more painful    Foul-smelling fluid leaking from the skin    Rash improves, but then comes  back   Date Last Reviewed: 11/1/2016 2000-2017 The "[employer]. [address] [place] [address], [place] [address]. All rights reserved. This information is not intended as a substitute for professional medical care. Always follow your healthcare professional's instructions.      Shingles  Shingles is a viral infection caused by the same virus as chicken pox. Anyone who has had chicken pox may get shingles later in life. The virus stays in the body, but remains dormant (asleep). Shingles often occurs in older persons or persons with lowered immunity. But it can affect anyone at any age.  Shingles starts as a tingling patch of skin on one side of the body. Small, painful blisters may then appear. The rash does not spread to the rest of the body.  Exposure to shingles cannot cause shingles. However, it can cause chicken pox in anyone who has not had chicken pox or has not been vaccinated. The contagious period ends when all blisters have crusted over (generally about 2 weeks after the illness begins).  After the blisters heal, the affected skin may be sensitive or painful for months (neuralgia). This often gradually goes away.  A shingles vaccine is available. This can help prevent shingles or make it less painful. It is generally recommended for adults over the age of 60 who have had chicken pox in the past, but who have never had shingles. Adults over 60 who have had neither chicken pox nor shingles can prevent both diseases with the chicken pox vaccine. Ask your healthcare provider about these vaccines.  Home care    Medicines may be prescribed to help relieve pain. Take these medicines as directed. Ask your healthcare provider or pharmacist before using over-the-counter medicines for helping treat pain and itching.    In certain cases, antiviral medicines may be prescribed to reduce pain, shorten the illness, and prevent neuralgia. Take these medicines as directed.    Compresses made from a solution of cool  water mixed with cornstarch or baking soda may help relieve pain and itching.     Gently wash skin daily with soap and water to help prevent infection.  Be certain to rinse off all of the soap, which can be irritating.    Trim fingernails and try not to scratch. Scratching the sores may leave scars.    Stay home from work or school until all blisters have formed a crust and you are no longer contagious.  Follow-up care  Follow up with your healthcare provider or as directed by our staff.  When to seek medical advice    Fever of 100.4 F (38 C) or higher, or as directed by your healthcare provider    Affected skin is on the face or neck and any of the following occur:    Headache    Eye pain    Changes in vision    Sores near the eye    Weakness of facial muscles    Pain, redness, or swelling of a joint    Signs of skin infection: colored drainage from the sores, warmth, increasing redness, or increasing pain  Date Last Reviewed: 9/25/2015 2000-2017 [employer]. [address] [place] [address]. All rights reserved. This information is not intended as a substitute for professional medical care. Always follow your healthcare professional's instructions.              24 Hour Appointment Hotline       To make an appointment at any [facility], call [phone] ([phone]). If you don't have a family doctor or clinic, we will help you find one. [facility] clinics are conveniently located to serve the needs of you and your family.             Review of your medicines      START taking        Dose / Directions Last dose taken    ciprofloxacin 250 MG tablet   Commonly known as:  CIPRO   Dose:  250 mg   Quantity:  14 tablet        Take 1 tablet (250 mg) by mouth 2 times daily for 7 days   Refills:  0          Our records show that you are taking the medicines listed below. If these are incorrect, please call your family doctor or clinic.        Dose / Directions Last dose taken    ALBUTEROL  IN        Refills:  0        FLEXERIL PO        Refills:  0        FLINSTONES GUMMIES OMEGA-3 DHA Chew        Refills:  0        lidocaine 5 % Patch   Commonly known as:  LIDODERM   Quantity:  10 patch        Apply 1 patch to painful area for up to 12 h within a 24 h period.  Remove after 12 hours.   Refills:  0        TRAMADOL HCL PO   Dose:  50 mg        Take 50 mg by mouth   Refills:  0        UNABLE TO FIND        MEDICATION NAME: Oral contraceptive-unknown name   Refills:  0        valACYclovir 1000 mg tablet   Commonly known as:  VALTREX   Dose:  1000 mg   Quantity:  21 tablet        Take 1 tablet (1,000 mg) by mouth 3 times daily for 7 days   Refills:  0          STOP taking        Dose Reason for stopping Comments    PREDNISONE PO                      Prescriptions were sent or printed at these locations (1 Prescription)                   Other Prescriptions                Printed at Department/Unit printer (1 of 1)         ciprofloxacin (CIPRO) 250 MG tablet                Orders Needing Specimen Collection     None      Pending Results     No orders found from 10/31/2017 to 11/3/2017.            Pending Culture Results     No orders found from 10/31/2017 to 11/3/2017.            Pending Results Instructions     If you had any lab results that were not finalized at the time of your Discharge, you can call the ED Lab Result RN at [phone]. You will be contacted by this team for any positive Lab results or changes in treatment. The nurses are available 7 days a week from 10A to 6:30P.  You can leave a message 24 hours per day and they will return your call.        Test Results From Your Hospital Stay               Clinical Quality Measure: Blood Pressure Screening     Your blood pressure was checked while you were in the emergency department today. The last reading we obtained was  BP: 125/71 . Please read the guidelines below about what these numbers mean and what you should do about them.  If your  "systolic blood pressure (the top number) is less than 120 and your diastolic blood pressure (the bottom number) is less than 80, then your blood pressure is normal. There is nothing more that you need to do about it.  If your systolic blood pressure (the top number) is 120-139 or your diastolic blood pressure (the bottom number) is 80-89, your blood pressure may be higher than it should be. You should have your blood pressure rechecked within a year by a primary care provider.  If your systolic blood pressure (the top number) is 140 or greater or your diastolic blood pressure (the bottom number) is 90 or greater, you may have high blood pressure. High blood pressure is treatable, but if left untreated over time it can put you at risk for heart attack, stroke, or kidney failure. You should have your blood pressure rechecked by a primary care provider within the next 4 weeks.  If your provider in the emergency department today gave you specific instructions to follow-up with your doctor or provider even sooner than that, you should follow that instruction and not wait for up to 4 weeks for your follow-up visit.        Thank you for choosing [facility]       Thank you for choosing [facility] for your care. Our goal is always to provide you with excellent care. Hearing back from our patients is one way we can continue to improve our services. Please take a few minutes to complete the written survey that you may receive in the mail after you visit with us. Thank you!        [employer] Information     [employer] lets you send messages to your doctor, view your test results, renew your prescriptions, schedule appointments and more. To sign up, go to [email or web address] . Click on \"Log in\" on the left side of the screen, which will take you to the Welcome page. Then click on \"Sign up Now\" on the right side of the page.     You will be asked to enter the access code listed below, as well as some personal information. Please " follow the directions to create your username and password.     Your access code is: [email or web address]  Expires: 2018 11:42 PM     Your access code will  in 90 days. If you need help or a new code, please call your [facility] clinic or [phone].        Care EveryWhere ID     This is your Care EveryWhere ID. This could be used by other organizations to access your [facility] medical records  [id number]        Equal Access to Services     KALEIGH TAVAREZ : Hadii vannessa balbuena hadasho Soomaali, waaxda luqadaha, qaybta kaalmada adeegyaadilene, arnie huitron . So [facility] [phone].    ATENCIÓN: Si habla español, tiene a flowers disposición servicios gratuitos de asistencia lingüística. Llame al [phone].    We comply with applicable federal civil rights laws and [place] laws. We do not discriminate on the basis of race, color, national origin, age, disability, sex, sexual orientation, or gender identity.            After Visit Summary       This is your record. Keep this with you and show to your community pharmacist(s) and doctor(s) at your next visit.

## 2017-11-02 NOTE — ED AVS SNAPSHOT
Emergency Department    [address] [facility] [address]    Phone:  [phone]    Fax:  [phone]                                       Ashley Chauhan   MRN: [id number]    Department:   Emergency Department   Date of Visit:  11/2/2017           After Visit Summary Signature Page     I have received my discharge instructions, and my questions have been answered. I have discussed any challenges I see with this plan with the nurse or doctor.    ..........................................................................................................................................  Patient/Patient Representative Signature      ..........................................................................................................................................  Patient Representative Print Name and Relationship to Patient    ..................................................               ................................................  Date                                            Time    ..........................................................................................................................................  Reviewed by Signature/Title    ...................................................              ..............................................  Date                                                            Time

## 2017-11-02 NOTE — LETTER
To Whom it may concern:      Ashley IZZY Chauhan was seen in our Emergency Department today, 11/02/17. Please excuse her from work until 11/040/2017.    Sincerely,        Dary Oseguera MD

## 2017-11-03 NOTE — ED PROVIDER NOTES
History     Chief Complaint:  Rash      HPI   Ashley Chauhan is a 27 year old female who presents to the emergency department for evaluation of rash. The patient states that she developed an itchy, burning red rash on her left medial thigh yesterday evening, which has been spreading, swollen, and is painful. She additionally notes that she has felt somewhat generally itchy throughout her body, which is similar to when she had shingles in the past. This prompted her ED visit today.      The patient additionally states that she felt somewhat shortness of breath when she is feeling anxious due to the pain, but denies any shortness of breath at the present time or any other associated symptoms. She denies any recent fevers, cough, vomiting, or diarrhea. No recent travel or known ill contacts.     Allergies:  Aspirin, anaphylaxis  Amoxicillin, vomiting  Lortab [Hydrocodone-Acetaminophen], vomiting  Penicillin G, vomiting    Medications:    Tramadol  Albuterol  Lidoderm patches  Hormonal IUD    Past Medical History:    Chronic shoulder pain    Past Surgical History:      Laparoscopic ovarian cystectomy    Family History:    No past pertinent family history.    Social History:  Presents with her significant other.   Current Every day smoker, 0.25 ppd for 10 years.  Positive for alcohol use, 1-2 drinks weekly.   Marital Status:   [2]    Review of Systems   Constitutional: Negative for fever.   Respiratory: Positive for shortness of breath (Resolved). Negative for cough.    Gastrointestinal: Negative for diarrhea and vomiting.   Skin: Positive for rash.   All other systems reviewed and are negative.    Physical Exam   First Vitals:  BP: 125/71  Heart Rate: 89  Temp: 97.5  F (36.4  C)  Resp: 20  Height: 152.4 cm (5')  Weight: 81.2 kg (179 lb)  SpO2: 99 %    Physical Exam  General: Well-nourished  Eyes: PERRL, conjunctivae pink no scleral icterus or conjunctival injection  ENT:  Moist mucus membranes,  posterior oropharynx clear without erythema or exudates  Respiratory:  Lungs clear to auscultation bilaterally, no crackles/rubs/wheezes.  Good air movement  CV: Normal rate and rhythm, no murmurs/rubs/gallops  GI:  Abdomen soft and non-distended.  Normoactive BS.  No tenderness, guarding or rebound  Skin: Warm, dry.  No rashes or petechiae.  0.5 cm area of redness over left lower back.  Left thigh with several small ~0.5cm areas of left thigh with some confluence.  Minimally warm.  +tender.  No vesicles.  Musculoskeletal: No peripheral edema or calf tenderness  Neuro: Alert and oriented to person/place/time  Psychiatric: Anxious     Emergency Department Course   Emergency Department Course:  Nursing notes and vitals reviewed. 2321 I performed an exam of the patient as documented above.     Findings and plan explained to the Patient. Patient discharged home with instructions regarding supportive care, medications, and reasons to return. The importance of close follow-up was reviewed. The patient was prescribed Cipro.     Impression & Plan    Medical Decision Making:  Ashley Chauhan is a pleasant 27 year old female who comes today with concern of a painful, somewhat itchy rash. It does not appear to be frankly confluent cellulitis, though it could be early cellulitis. It appears to be consistent with possible folliculitis, although I did discuss with her that this could represent early shingles. There are no vesicles at this point to be diagnotic, but she has had it in the past and it is possible that this is a dermatomal distribution at this point. Given that there is no diagnostic appearance of shingles, we will start her on some antibiotics for possible folliculitis. She does not appear to be toxic. She noted to nursing that she was mildly short of breath earlier, but she is not short of breath now. She has normal vital signs and she attributed this to anxiety with her pain. I do not think that she needs  further evaluation of this as she has no other associated symptoms. At this point, she is safe for discharge home. I discussed with her the possibility of shingles and have asked her to contact her doctor if she developed characteristic blisters so she can be started on antivirals. I have asked her to return for any new or worsening symptoms.     Diagnosis:    ICD-10-CM   1. Rash R21       Disposition:  discharged to home    Discharge Medications:  New Prescriptions    CIPROFLOXACIN (CIPRO) 250 MG TABLET    Take 1 tablet (250 mg) by mouth 2 times daily for 7 days     ICarmen, am serving as a scribe on 11/2/2017 at 11:21 PM to personally document services performed by Dary Oseguera MD based on my observations and the provider's statements to me.     Carmen Nugent  11/2/2017    EMERGENCY DEPARTMENT       Dary Oseguera MD  11/03/17 0101

## 2017-11-03 NOTE — DISCHARGE INSTRUCTIONS
*You may resume diet and activities as tolerated.  *Take medications as prescribed.  Ciprofloxacin for possible folliculitis. Ibuprofen and/or tylenol for pain. Continue your current medications.  Stop taking cipro if you develop joint pain or tendon pain.  *Follow-up with your doctor for a recheck in 2-3 days.  Call earlier if you develop blisters like shingles.  *Return if you develop high fever, vomiting, faint or feel like you will faint or become worse in any way.      Folliculitis  Folliculitis is an inflammation of a hair follicle. A hair follicle is the little pocket where a hair grows out of the skin. Bacteria normally live on the skin. But sometimes bacteria can get trapped in a follicle and cause infection. This causes a bumpy rash. The area over the follicles is red and raised. It may itch or be painful. The bumps may have fluid (pus) inside. The pus may leak and then form crusts. Sores can spread to other areas of the body. Once it goes away, folliculitis can come back at any time. Severe cases may cause permanent hair loss and scarring.  Folliculitis can happen anywhere on the body where hair grows. It can be caused by rubbing from tight clothing. Ingrown hairs can cause it. Soaking in a hot tub or swimming pool that has bacteria in the water can cause it. It may also occur if a hair follicle is blocked by a bandage.  Sores often go away in a few days with no treatment. In some cases, medicine may be given. A small piece of skin or pus may be taken to find the type of bacteria causing the infection.  Home care  The healthcare provider may prescribe an antibiotic cream or ointment.  Oral antibiotics may also be prescribed. Or you may be told to use an over-the-counter antibiotic cream. Follow all instructions when using any of these medicines.  General care:    Apply warm, moist compresses to the sores for 20 minutes up to 3 times a day. You can make a compress by soaking a cloth in warm water. Squeeze  out excess water.    Don t cut, poke, or squeeze the sores. This can be painful and spread infection.    Don t scratch the affected area. Scratching can delay healing.    Don t shave the areas affected by folliculitis.    If the sores leak fluid, cover the area with a nonstick gauze bandage. Use as little tape as possible. Carefully discard all soiled bandages.    Dress in loose cotton clothing.    Change sheets and blankets if they are soiled by pus. Wash all clothes, towels, sheets, and cloth diapers in soap and hot water. Do not share clothes, towels, or sheets with other family members.    Do not soak the sores in bath water. This can spread infection. Instead, keep the area clean by gently washing sores with soap and warm water.    Wash your hands or use antibacterial gels often to prevent spreading the bacteria.  Follow-up care  Follow up with your healthcare provider, or as advised.  When to seek medical advice  Call your healthcare provider right away if any of these occur:    Fever of 100.4 F (38 C) or higher    Spreading of the rash    Rash does not get better with treatment    Redness or swelling that gets worse    Rash becomes more painful    Foul-smelling fluid leaking from the skin    Rash improves, but then comes back   Date Last Reviewed: 11/1/2016 2000-2017 [employer]. [address] [place] [address], [place] [id number]. All rights reserved. This information is not intended as a substitute for professional medical care. Always follow your healthcare professional's instructions.      Shingles  Shingles is a viral infection caused by the same virus as chicken pox. Anyone who has had chicken pox may get shingles later in life. The virus stays in the body, but remains dormant (asleep). Shingles often occurs in older persons or persons with lowered immunity. But it can affect anyone at any age.  Shingles starts as a tingling patch of skin on one side of the body. Small, painful blisters may then  appear. The rash does not spread to the rest of the body.  Exposure to shingles cannot cause shingles. However, it can cause chicken pox in anyone who has not had chicken pox or has not been vaccinated. The contagious period ends when all blisters have crusted over (generally about 2 weeks after the illness begins).  After the blisters heal, the affected skin may be sensitive or painful for months (neuralgia). This often gradually goes away.  A shingles vaccine is available. This can help prevent shingles or make it less painful. It is generally recommended for adults over the age of 60 who have had chicken pox in the past, but who have never had shingles. Adults over 60 who have had neither chicken pox nor shingles can prevent both diseases with the chicken pox vaccine. Ask your healthcare provider about these vaccines.  Home care    Medicines may be prescribed to help relieve pain. Take these medicines as directed. Ask your healthcare provider or pharmacist before using over-the-counter medicines for helping treat pain and itching.    In certain cases, antiviral medicines may be prescribed to reduce pain, shorten the illness, and prevent neuralgia. Take these medicines as directed.    Compresses made from a solution of cool water mixed with cornstarch or baking soda may help relieve pain and itching.     Gently wash skin daily with soap and water to help prevent infection.  Be certain to rinse off all of the soap, which can be irritating.    Trim fingernails and try not to scratch. Scratching the sores may leave scars.    Stay home from work or school until all blisters have formed a crust and you are no longer contagious.  Follow-up care  Follow up with your healthcare provider or as directed by our staff.  When to seek medical advice    Fever of 100.4 F (38 C) or higher, or as directed by your healthcare provider    Affected skin is on the face or neck and any of the following occur:    Headache    Eye  pain    Changes in vision    Sores near the eye    Weakness of facial muscles    Pain, redness, or swelling of a joint    Signs of skin infection: colored drainage from the sores, warmth, increasing redness, or increasing pain  Date Last Reviewed: 9/25/2015 2000-2017 [employer]. [address] [place] [id number]. All rights reserved. This information is not intended as a substitute for professional medical care. Always follow your healthcare professional's instructions.

## 2018-01-15 ENCOUNTER — APPOINTMENT (OUTPATIENT)
Dept: GENERAL RADIOLOGY | Facility: CLINIC | Age: 28
End: 2018-01-15
Attending: EMERGENCY MEDICINE
Payer: COMMERCIAL

## 2018-01-15 ENCOUNTER — HOSPITAL ENCOUNTER (EMERGENCY)
Facility: CLINIC | Age: 28
Discharge: HOME | End: 2018-01-15
Attending: EMERGENCY MEDICINE | Admitting: EMERGENCY MEDICINE
Payer: COMMERCIAL

## 2018-01-15 VITALS
WEIGHT: 160 LBS | DIASTOLIC BLOOD PRESSURE: 66 MMHG | HEIGHT: 60 IN | OXYGEN SATURATION: 98 % | BODY MASS INDEX: 31.41 KG/M2 | SYSTOLIC BLOOD PRESSURE: 99 MMHG | RESPIRATION RATE: 16 BRPM

## 2018-01-15 DIAGNOSIS — J06.9 UPPER RESPIRATORY TRACT INFECTION, UNSPECIFIED TYPE: ICD-10-CM

## 2018-01-15 DIAGNOSIS — R11.2 NON-INTRACTABLE VOMITING WITH NAUSEA, UNSPECIFIED VOMITING TYPE: ICD-10-CM

## 2018-01-15 LAB
ALBUMIN SERPL-MCNC: 3.5 G/DL (ref 3.4–5)
ALP SERPL-CCNC: 70 U/L (ref 40–150)
ALT SERPL W P-5'-P-CCNC: 21 U/L (ref 0–50)
ANION GAP SERPL CALCULATED.3IONS-SCNC: 6 MMOL/L (ref 3–14)
AST SERPL W P-5'-P-CCNC: 14 U/L (ref 0–45)
BASOPHILS # BLD AUTO: 0 10E9/L (ref 0–0.2)
BASOPHILS NFR BLD AUTO: 0.6 %
BILIRUB SERPL-MCNC: 0.3 MG/DL (ref 0.2–1.3)
BUN SERPL-MCNC: 14 MG/DL (ref 7–30)
CALCIUM SERPL-MCNC: 8.4 MG/DL (ref 8.5–10.1)
CHLORIDE SERPL-SCNC: 111 MMOL/L (ref 94–109)
CO2 SERPL-SCNC: 25 MMOL/L (ref 20–32)
CREAT SERPL-MCNC: 0.75 MG/DL (ref 0.52–1.04)
DIFFERENTIAL METHOD BLD: NORMAL
EOSINOPHIL # BLD AUTO: 0.3 10E9/L (ref 0–0.7)
EOSINOPHIL NFR BLD AUTO: 5.2 %
ERYTHROCYTE [DISTWIDTH] IN BLOOD BY AUTOMATED COUNT: 12.9 % (ref 10–15)
GFR SERPL CREATININE-BSD FRML MDRD: >90 ML/MIN/1.7M2
GLUCOSE SERPL-MCNC: 88 MG/DL (ref 70–99)
HCT VFR BLD AUTO: 43.2 % (ref 35–47)
HGB BLD-MCNC: 14.6 G/DL (ref 11.7–15.7)
IMM GRANULOCYTES # BLD: 0 10E9/L (ref 0–0.4)
IMM GRANULOCYTES NFR BLD: 0.2 %
LYMPHOCYTES # BLD AUTO: 2.1 10E9/L (ref 0.8–5.3)
LYMPHOCYTES NFR BLD AUTO: 33.1 %
MCH RBC QN AUTO: 31.7 PG (ref 26.5–33)
MCHC RBC AUTO-ENTMCNC: 33.8 G/DL (ref 31.5–36.5)
MCV RBC AUTO: 94 FL (ref 78–100)
MONOCYTES # BLD AUTO: 0.7 10E9/L (ref 0–1.3)
MONOCYTES NFR BLD AUTO: 10.6 %
NEUTROPHILS # BLD AUTO: 3.2 10E9/L (ref 1.6–8.3)
NEUTROPHILS NFR BLD AUTO: 50.3 %
NRBC # BLD AUTO: 0 10*3/UL
NRBC BLD AUTO-RTO: 0 /100
PLATELET # BLD AUTO: 182 10E9/L (ref 150–450)
POTASSIUM SERPL-SCNC: 4 MMOL/L (ref 3.4–5.3)
PROT SERPL-MCNC: 6.9 G/DL (ref 6.8–8.8)
RBC # BLD AUTO: 4.6 10E12/L (ref 3.8–5.2)
SODIUM SERPL-SCNC: 142 MMOL/L (ref 133–144)
WBC # BLD AUTO: 6.4 10E9/L (ref 4–11)

## 2018-01-15 PROCEDURE — 96374 THER/PROPH/DIAG INJ IV PUSH: CPT

## 2018-01-15 PROCEDURE — 99284 EMERGENCY DEPT VISIT MOD MDM: CPT | Mod: 25

## 2018-01-15 PROCEDURE — 96376 TX/PRO/DX INJ SAME DRUG ADON: CPT

## 2018-01-15 PROCEDURE — 85025 COMPLETE CBC W/AUTO DIFF WBC: CPT | Performed by: EMERGENCY MEDICINE

## 2018-01-15 PROCEDURE — 25000128 H RX IP 250 OP 636: Performed by: EMERGENCY MEDICINE

## 2018-01-15 PROCEDURE — 96361 HYDRATE IV INFUSION ADD-ON: CPT

## 2018-01-15 PROCEDURE — 71046 X-RAY EXAM CHEST 2 VIEWS: CPT

## 2018-01-15 PROCEDURE — 80053 COMPREHEN METABOLIC PANEL: CPT | Performed by: EMERGENCY MEDICINE

## 2018-01-15 RX ORDER — ONDANSETRON 2 MG/ML
4 INJECTION INTRAMUSCULAR; INTRAVENOUS ONCE
Status: COMPLETED | OUTPATIENT
Start: 2018-01-15 | End: 2018-01-15

## 2018-01-15 RX ORDER — HYDROMORPHONE HYDROCHLORIDE 1 MG/ML
0.5 INJECTION, SOLUTION INTRAMUSCULAR; INTRAVENOUS; SUBCUTANEOUS ONCE
Status: COMPLETED | OUTPATIENT
Start: 2018-01-15 | End: 2018-01-15

## 2018-01-15 RX ORDER — CODEINE PHOSPHATE AND GUAIFENESIN 10; 100 MG/5ML; MG/5ML
1-2 SOLUTION ORAL EVERY 4 HOURS PRN
Qty: 240 ML | Refills: 0 | Status: SHIPPED | OUTPATIENT
Start: 2018-01-15 | End: 2019-03-25

## 2018-01-15 RX ORDER — ONDANSETRON 4 MG/1
4 TABLET, ORALLY DISINTEGRATING ORAL EVERY 6 HOURS PRN
Qty: 10 TABLET | Refills: 0 | Status: SHIPPED | OUTPATIENT
Start: 2018-01-15 | End: 2019-03-25

## 2018-01-15 RX ADMIN — SODIUM CHLORIDE 1000 ML: 9 INJECTION, SOLUTION INTRAVENOUS at 08:26

## 2018-01-15 RX ADMIN — ONDANSETRON 4 MG: 2 INJECTION INTRAMUSCULAR; INTRAVENOUS at 08:26

## 2018-01-15 RX ADMIN — Medication 0.5 MG: at 08:29

## 2018-01-15 ASSESSMENT — ENCOUNTER SYMPTOMS
FATIGUE: 1
WEAKNESS: 1
LIGHT-HEADEDNESS: 1
VOMITING: 1
ABDOMINAL PAIN: 1
COUGH: 1
NAUSEA: 1

## 2018-01-15 NOTE — DISCHARGE INSTRUCTIONS
Home, fluids, rest, diet as tolerated.  Zofran as needed for nausea, Imodium as needed for diarrhea.  Work note to be off today and tomorrow.  Recheck in 2-3 days in the clinic if not improving.    Opioid Medication Information  You have been given a prescription for an opioid (narcotic) pain medicine and/or have received a pain medicine while here in the emergency department. These medicines can make you drowsy or impaired. You must not drive, operate dangerous equipment, or engage in any other dangerous activities while taking these medications. If you drive while taking these medications, you could be arrested for DUI, or driving under the influence. Do not drink any alcohol while you are taking these medications.   Opioid pain medications can cause addiction. If you have a history of chemical dependency of any type, you are at a higher risk of becoming addicted to pain medications. Only take these prescribed medications to treat your pain when all other options have been tried. Take it for as short a time and as few doses as possible. Store your pain pills in a secure place, as they are frequently stolen and provide a dangerous opportunity for children or visitors in your house to start abusing these powerful medications. We will not replace any lost or stolen medicine. As soon as your pain is better, you should flush all your remaining medication.   Many prescription pain medications contain Tylenol (acetaminophen), including Vicodin, Tylenol #3, Norco, Lortab, and Percocet. You should not take any extra pills of Tylenol if you are using these prescription medications or you can get very sick. Do not ever take more than 4000 mg of acetaminophen in any 24 hour period.  All opioids tend to cause constipation. Drink plenty of water and eat foods that have a lot of fiber, such as fruits, vegetables, prune juice, apple juice and high fiber cereal. Take a laxative if you don t move your bowels at least every other  day. Miralax, Milk of Magnesia, Colace, or Senna can be used to keep you regular.

## 2018-01-15 NOTE — ED PROVIDER NOTES
History     Chief Complaint:  Nausea and Vomiting, Cough    HPI   Ashley Chauhan is a 27 year old female smoker who presents to the ED for evaluation of a cough and nausea/vomiting. The patient reports a one week history of a cough, which has been productive with green/yellowish phlegm. She complains she is extremely congested and she expresses concern that a large bloody clot came from her nose this morning. Her nausea and vomiting came on last night and she has been experiencing some abdominal pain. She states it is painful to have a bowel movement. She states she feels weak and light-headed. She denies any recent marijuana use (last smoke with New Years). She mentions that a lot of people at her work have been ill recently.    Allergies:  Aspirin  Amoxicillin  Lortab  Penicillin    Medications:    Tramadol  Flexeril  Albuterol  Lidoderm  Multivitamin  Valtrex    Past Medical History:    Chronic shoulder pain  Uncomplicated asthma    Past Surgical History:    C sections  Laparoscopic cystectomy    Family History:    No past pertinent family history.    Social History:  The patient was accompanied to the ED by her boyfriend.  Smoking Status: Current smoker 0.25 packs daily, 10 yrs  Smokeless Tobacco: Never  Alcohol Use: Yes  The patient works in a cafe    Review of Systems   Constitutional: Positive for fatigue.   HENT: Positive for congestion.    Respiratory: Positive for cough.    Gastrointestinal: Positive for abdominal pain, nausea and vomiting.   Neurological: Positive for weakness and light-headedness.     Physical Exam   First Vitals:  BP: (P) 129/85  Heart Rate: 111  Temp: (P) 97.7  F (36.5  C)  Resp: 18  Height: 152.4 cm (5')  Weight: 72.6 kg (160 lb)  SpO2: 99 %    Physical Exam  Nursing note and vitals reviewed.  Constitutional:  Appears well-developed and well-nourished, uncomfortable.   HENT:   Mouth/Throat:  Mucosa is moist.  Eyes:    Conjunctivae are normal.      Pupils are equal, round, and  reactive to light.      Right eye exhibits no discharge. Left eye exhibits no discharge.      No scleral icterus.   Cardiovascular:  Normal rate, regular rhythm.      Normal heart sounds and intact distal pulses.       No murmur heard.  Pulmonary/Chest:  Effort normal and breath sounds normal. No respiratory distress.     No wheezes. No rales. No chest wall tenderness. No stridor.   Abdominal:   Mild diffuse abdominal tenderness without rebound or guarding.     Soft. No distension and no mass. No flank pain.  Musculoskeletal:  Normal range of motion.      No edema and no tenderness.                                       Neck supple, no midline cervical tenderness.   Neurological:   Alert and oriented to person, place, and year.      No cranial nerve deficit.      Exhibits normal muscle tone. Coordination normal.      GCS eye subscore is 4. GCS verbal subscore is 5.      GCS motor subscore is 6.   Skin:    Skin is warm and dry. No rash noted. No diaphoresis.      No erythema. No pallor.   Psychiatric:   Behavior is normal. Judgment and thought content normal.     Emergency Department Course     Imaging:  Radiographic findings were communicated with the patient who voiced understanding of the findings.    Chest Xray, PA & LAT:  Impression: Negative.  Per Radiology.    Laboratory:  CBC: WBC 6.4, HGB 14.6,   CMP: Calcium 8.4(L), Chloride 111(H) o/w WNL (Creat 0.75)    Interventions:  0824 Zofran injection 4mg    0824 Dilaudid injection 0.5mg    0824 NS Bolus    Emergency Department Course:  Nursing notes and vitals reviewed.  I performed an exam of the patient as documented above.     0922 I rechecked the patient. Findings and plan explained to the patient. Patient discharged home with instructions regarding supportive care, medications, and reasons to return. The importance of close follow-up was reviewed. The patient was prescribed Robitussin and Zofran.    Impression & Plan    Medical Decision Making:  Ashley  Gissel is a 27 year old female who presents with 2 weeks of cough, now with some pleuritic left chest pain, nausea and vomiting today and loose stools.  She could have had influenza, but I did get a chest x-ray and it is normal.  No history to suggest a PE.  CBC is normal and her comprehensive metabolic panel is normal.  She was given Zofran for nausea, a half a milligram of Dilaudid for pain and a liter of normal saline IV.  She was feeling better, but still having some coughing.  I suspect that she may have had influenza or another viral upper respiratory infection and now has picked up a stomach virus. I am going to treat her symptomatically and have her follow-up in 2-3 days as needed.    Diagnosis:  1. (R11.2) Non-intractable vomiting with nausea, unspecified vomiting type  Comment: Viral GE    2. (J06.9) Upper respiratory tract infection, unspecified type    Disposition:  The patient will be discharged from the hospital. Home, fluids, rest, diet as tolerated.  Zofran as needed for nausea, Imodium as needed for diarrhea.  Work note to be off today and tomorrow.  Recheck in 2-3 days in the clinic if not improving.    Discharge Medications:  New Prescriptions    GUAIFENESIN-CODEINE (ROBITUSSIN AC) 100-10 MG/5ML SOLN SOLUTION    Take 5-10 mLs by mouth every 4 hours as needed for cough    ONDANSETRON (ZOFRAN ODT) 4 MG ODT TAB    Take 1 tablet (4 mg) by mouth every 6 hours as needed for nausea         1/15/2018    EMERGENCY DEPARTMENT    I, Angie Flowers, am serving as a scribe at 0755 on January 15, 2018  to document services personally performed by Dr. Carbajal, based on my observations and the provider's statements to me.       Francesca Carbajal MD  01/15/18 1718

## 2018-01-15 NOTE — ED AVS SNAPSHOT
Emergency Department    [address] [facility] [address]    Phone:  [phone]    Fax:  [phone]                                       Ashley Chauhan   MRN: [id number]    Department:   Emergency Department   Date of Visit:  1/15/2018           Patient Information     Date Of Birth          1990        Your diagnoses for this visit were:     Non-intractable vomiting with nausea, unspecified vomiting type Viral GE    Upper respiratory tract infection, unspecified type        You were seen by Francesca Carbajal MD.      Follow-up Information     Schedule an appointment as soon as possible for a visit with [facility].    Why:  As needed    Contact information:    [address] [facility] [address] [phone]          Discharge Instructions       Home, fluids, rest, diet as tolerated.  Zofran as needed for nausea, Imodium as needed for diarrhea.  Work note to be off today and tomorrow.  Recheck in 2-3 days in the clinic if not improving.    Opioid Medication Information  You have been given a prescription for an opioid (narcotic) pain medicine and/or have received a pain medicine while here in the emergency department. These medicines can make you drowsy or impaired. You must not drive, operate dangerous equipment, or engage in any other dangerous activities while taking these medications. If you drive while taking these medications, you could be arrested for DUI, or driving under the influence. Do not drink any alcohol while you are taking these medications.   Opioid pain medications can cause addiction. If you have a history of chemical dependency of any type, you are at a higher risk of becoming addicted to pain medications. Only take these prescribed medications to treat your pain when all other options have been tried. Take it for as short a time and as few doses as possible. Store your pain pills in a secure place, as they are frequently stolen  and provide a dangerous opportunity for children or visitors in your house to start abusing these powerful medications. We will not replace any lost or stolen medicine. As soon as your pain is better, you should flush all your remaining medication.   Many prescription pain medications contain Tylenol (acetaminophen), including Vicodin, Tylenol #3, Norco, Lortab, and Percocet. You should not take any extra pills of Tylenol if you are using these prescription medications or you can get very sick. Do not ever take more than 4000 mg of acetaminophen in any 24 hour period.  All opioids tend to cause constipation. Drink plenty of water and eat foods that have a lot of fiber, such as fruits, vegetables, prune juice, apple juice and high fiber cereal. Take a laxative if you don t move your bowels at least every other day. Miralax, Milk of Magnesia, Colace, or Senna can be used to keep you regular.       Discharge References/Attachments     VOMITING OR DIARRHEA (ADULT), DIET FOR (ENGLISH)    URI, VIRAL, NO ABX (ADULT) (ENGLISH)      24 Hour Appointment Hotline       To make an appointment at any [facility] call [phone] ([phone]). If you don't have a family doctor or clinic, we will help you find one. [facility] clinics are conveniently located to serve the needs of you and your family.             Review of your medicines      START taking        Dose / Directions Last dose taken    guaiFENesin-codeine 100-10 MG/5ML Soln solution   Commonly known as:  ROBITUSSIN AC   Dose:  1-2 tsp.   Quantity:  240 mL        Take 5-10 mLs by mouth every 4 hours as needed for cough   Refills:  0        ondansetron 4 MG ODT tab   Commonly known as:  ZOFRAN ODT   Dose:  4 mg   Quantity:  10 tablet        Take 1 tablet (4 mg) by mouth every 6 hours as needed for nausea   Refills:  0          Our records show that you are taking the medicines listed below. If these are incorrect, please call your family doctor or clinic.         Dose / Directions Last dose taken    ALBUTEROL IN        Refills:  0        FLEXERIL PO        Refills:  0        FLINSTONES GUMMIES OMEGA-3 DHA Chew        Refills:  0        lidocaine 5 % Patch   Commonly known as:  LIDODERM   Quantity:  10 patch        Apply 1 patch to painful area for up to 12 h within a 24 h period.  Remove after 12 hours.   Refills:  0        TRAMADOL HCL PO   Dose:  50 mg        Take 50 mg by mouth   Refills:  0        UNABLE TO FIND        MEDICATION NAME: Oral contraceptive-unknown name   Refills:  0        valACYclovir 1000 mg tablet   Commonly known as:  VALTREX   Dose:  1000 mg   Quantity:  21 tablet        Take 1 tablet (1,000 mg) by mouth 3 times daily for 7 days   Refills:  0                Prescriptions were sent or printed at these locations (2 Prescriptions)                   Other Prescriptions                Printed at Department/Unit printer (2 of 2)         guaiFENesin-codeine (ROBITUSSIN AC) 100-10 MG/5ML SOLN solution               ondansetron (ZOFRAN ODT) 4 MG ODT tab                Procedures and tests performed during your visit     CBC with platelets + differential    Chest XR,  PA & LAT    Comprehensive metabolic panel      Orders Needing Specimen Collection     None      Pending Results     No orders found from 1/13/2018 to 1/16/2018.            Pending Culture Results     No orders found from 1/13/2018 to 1/16/2018.            Pending Results Instructions     If you had any lab results that were not finalized at the time of your Discharge, you can call the ED Lab Result RN at [phone]. You will be contacted by this team for any positive Lab results or changes in treatment. The nurses are available 7 days a week from 10A to 6:30P.  You can leave a message 24 hours per day and they will return your call.        Test Results From Your Hospital Stay        1/15/2018  8:32 AM      Component Results     Component Value Ref Range & Units Status    WBC 6.4 4.0 - 11.0  10e9/L Final    RBC Count 4.60 3.8 - 5.2 10e12/L Final    Hemoglobin 14.6 11.7 - 15.7 g/dL Final    Hematocrit 43.2 35.0 - 47.0 % Final    MCV 94 78 - 100 fl Final    MCH 31.7 26.5 - 33.0 pg Final    MCHC 33.8 31.5 - 36.5 g/dL Final    RDW 12.9 10.0 - 15.0 % Final    Platelet Count 182 150 - 450 10e9/L Final    Diff Method Automated Method  Final    % Neutrophils 50.3 % Final    % Lymphocytes 33.1 % Final    % Monocytes 10.6 % Final    % Eosinophils 5.2 % Final    % Basophils 0.6 % Final    % Immature Granulocytes 0.2 % Final    Nucleated RBCs 0 0 /100 Final    Absolute Neutrophil 3.2 1.6 - 8.3 10e9/L Final    Absolute Lymphocytes 2.1 0.8 - 5.3 10e9/L Final    Absolute Monocytes 0.7 0.0 - 1.3 10e9/L Final    Absolute Eosinophils 0.3 0.0 - 0.7 10e9/L Final    Absolute Basophils 0.0 0.0 - 0.2 10e9/L Final    Abs Immature Granulocytes 0.0 0 - 0.4 10e9/L Final    Absolute Nucleated RBC 0.0  Final         1/15/2018  9:02 AM      Component Results     Component Value Ref Range & Units Status    Sodium 142 133 - 144 mmol/L Final    Potassium 4.0 3.4 - 5.3 mmol/L Final    Chloride 111 (H) 94 - 109 mmol/L Final    Carbon Dioxide 25 20 - 32 mmol/L Final    Anion Gap 6 3 - 14 mmol/L Final    Glucose 88 70 - 99 mg/dL Final    Urea Nitrogen 14 7 - 30 mg/dL Final    Creatinine 0.75 0.52 - 1.04 mg/dL Final    GFR Estimate >90 >60 mL/min/1.7m2 Final    Non  GFR Calc    GFR Estimate If Black >90 >60 mL/min/1.7m2 Final    African American GFR Calc    Calcium 8.4 (L) 8.5 - 10.1 mg/dL Final    Bilirubin Total 0.3 0.2 - 1.3 mg/dL Final    Albumin 3.5 3.4 - 5.0 g/dL Final    Protein Total 6.9 6.8 - 8.8 g/dL Final    Alkaline Phosphatase 70 40 - 150 U/L Final    ALT 21 0 - 50 U/L Final    AST 14 0 - 45 U/L Final         1/15/2018  8:55 AM      Narrative     CHEST TWO VIEWS  1/15/2018 8:45 AM    HISTORY:  Cough for two weeks. Left pleuritic pain, fevers.    COMPARISON:  3/26/2017        Impression     IMPRESSION:  Negative.      JAN BURGOS MD                Clinical Quality Measure: Blood Pressure Screening     Your blood pressure was checked while you were in the emergency department today. The last reading we obtained was  BP: 102/76 . Please read the guidelines below about what these numbers mean and what you should do about them.  If your systolic blood pressure (the top number) is less than 120 and your diastolic blood pressure (the bottom number) is less than 80, then your blood pressure is normal. There is nothing more that you need to do about it.  If your systolic blood pressure (the top number) is 120-139 or your diastolic blood pressure (the bottom number) is 80-89, your blood pressure may be higher than it should be. You should have your blood pressure rechecked within a year by a primary care provider.  If your systolic blood pressure (the top number) is 140 or greater or your diastolic blood pressure (the bottom number) is 90 or greater, you may have high blood pressure. High blood pressure is treatable, but if left untreated over time it can put you at risk for heart attack, stroke, or kidney failure. You should have your blood pressure rechecked by a primary care provider within the next 4 weeks.  If your provider in the emergency department today gave you specific instructions to follow-up with your doctor or provider even sooner than that, you should follow that instruction and not wait for up to 4 weeks for your follow-up visit.        Thank you for choosing [facility]       Thank you for choosing [facility] for your care. Our goal is always to provide you with excellent care. Hearing back from our patients is one way we can continue to improve our services. Please take a few minutes to complete the written survey that you may receive in the mail after you visit with us. Thank you!        [employer] Information     [employer] lets you send messages to your doctor, view your test results, renew your prescriptions, schedule  "appointments and more. To sign up, go to [email or web address] . Click on \"Log in\" on the left side of the screen, which will take you to the Welcome page. Then click on \"Sign up Now\" on the right side of the page.     You will be asked to enter the access code listed below, as well as some personal information. Please follow the directions to create your username and password.     Your access code is: [email or web address]  Expires: 2018 10:42 PM     Your access code will  in 90 days. If you need help or a new code, please call your [facility] clinic or [phone].        Care EveryWhere ID     This is your Care EveryWhere ID. This could be used by other organizations to access your [facility] medical records  [id number]        Equal Access to Services     KALEIGH TAVAREZ : Yue Sepulveda, sravanthi zuluaga, foreign rothman, arnie ziegler. So [facility] [phone].    ATENCIÓN: Si habla español, tiene a flowers disposición servicios gratuitos de asistencia lingüística. Llame al [phone].    We comply with applicable federal civil rights laws and [place] laws. We do not discriminate on the basis of race, color, national origin, age, disability, sex, sexual orientation, or gender identity.            After Visit Summary       This is your record. Keep this with you and show to your community pharmacist(s) and doctor(s) at your next visit.                  "

## 2018-01-15 NOTE — ED AVS SNAPSHOT
Emergency Department    [address] [facility] [address]    Phone:  [phone]    Fax:  [phone]                                       Ashley Chauhan   MRN: [id number]    Department:   Emergency Department   Date of Visit:  1/15/2018           After Visit Summary Signature Page     I have received my discharge instructions, and my questions have been answered. I have discussed any challenges I see with this plan with the nurse or doctor.    ..........................................................................................................................................  Patient/Patient Representative Signature      ..........................................................................................................................................  Patient Representative Print Name and Relationship to Patient    ..................................................               ................................................  Date                                            Time    ..........................................................................................................................................  Reviewed by Signature/Title    ...................................................              ..............................................  Date                                                            Time

## 2018-02-08 ENCOUNTER — APPOINTMENT (OUTPATIENT)
Dept: CT IMAGING | Facility: CLINIC | Age: 28
End: 2018-02-08
Attending: EMERGENCY MEDICINE
Payer: COMMERCIAL

## 2018-02-08 ENCOUNTER — HOSPITAL ENCOUNTER (EMERGENCY)
Facility: CLINIC | Age: 28
Discharge: HOME | End: 2018-02-08
Attending: EMERGENCY MEDICINE | Admitting: EMERGENCY MEDICINE
Payer: COMMERCIAL

## 2018-02-08 VITALS
DIASTOLIC BLOOD PRESSURE: 81 MMHG | RESPIRATION RATE: 16 BRPM | TEMPERATURE: 98.4 F | BODY MASS INDEX: 33.2 KG/M2 | OXYGEN SATURATION: 100 % | SYSTOLIC BLOOD PRESSURE: 140 MMHG | WEIGHT: 170 LBS

## 2018-02-08 DIAGNOSIS — R10.32 ABDOMINAL PAIN, LEFT LOWER QUADRANT: ICD-10-CM

## 2018-02-08 LAB
ALBUMIN UR-MCNC: NEGATIVE MG/DL
ANION GAP SERPL CALCULATED.3IONS-SCNC: 6 MMOL/L (ref 3–14)
APPEARANCE UR: CLEAR
BASOPHILS # BLD AUTO: 0 10E9/L (ref 0–0.2)
BASOPHILS NFR BLD AUTO: 0.4 %
BILIRUB UR QL STRIP: NEGATIVE
BUN SERPL-MCNC: 10 MG/DL (ref 7–30)
CALCIUM SERPL-MCNC: 8.5 MG/DL (ref 8.5–10.1)
CHLORIDE SERPL-SCNC: 109 MMOL/L (ref 94–109)
CO2 SERPL-SCNC: 25 MMOL/L (ref 20–32)
COLOR UR AUTO: YELLOW
CREAT SERPL-MCNC: 0.7 MG/DL (ref 0.52–1.04)
DIFFERENTIAL METHOD BLD: NORMAL
EOSINOPHIL # BLD AUTO: 0.1 10E9/L (ref 0–0.7)
EOSINOPHIL NFR BLD AUTO: 1.2 %
ERYTHROCYTE [DISTWIDTH] IN BLOOD BY AUTOMATED COUNT: 13 % (ref 10–15)
GFR SERPL CREATININE-BSD FRML MDRD: >90 ML/MIN/1.7M2
GLUCOSE SERPL-MCNC: 88 MG/DL (ref 70–99)
GLUCOSE UR STRIP-MCNC: NEGATIVE MG/DL
HCG UR QL: NEGATIVE
HCT VFR BLD AUTO: 42.4 % (ref 35–47)
HGB BLD-MCNC: 14.7 G/DL (ref 11.7–15.7)
HGB UR QL STRIP: NEGATIVE
IMM GRANULOCYTES # BLD: 0 10E9/L (ref 0–0.4)
IMM GRANULOCYTES NFR BLD: 0.1 %
KETONES UR STRIP-MCNC: NEGATIVE MG/DL
LEUKOCYTE ESTERASE UR QL STRIP: NEGATIVE
LYMPHOCYTES # BLD AUTO: 2.8 10E9/L (ref 0.8–5.3)
LYMPHOCYTES NFR BLD AUTO: 38.6 %
MCH RBC QN AUTO: 31.9 PG (ref 26.5–33)
MCHC RBC AUTO-ENTMCNC: 34.7 G/DL (ref 31.5–36.5)
MCV RBC AUTO: 92 FL (ref 78–100)
MONOCYTES # BLD AUTO: 0.5 10E9/L (ref 0–1.3)
MONOCYTES NFR BLD AUTO: 7.5 %
NEUTROPHILS # BLD AUTO: 3.8 10E9/L (ref 1.6–8.3)
NEUTROPHILS NFR BLD AUTO: 52.2 %
NITRATE UR QL: NEGATIVE
NRBC # BLD AUTO: 0 10*3/UL
NRBC BLD AUTO-RTO: 0 /100
PH UR STRIP: 7 PH (ref 5–7)
PLATELET # BLD AUTO: 191 10E9/L (ref 150–450)
POTASSIUM SERPL-SCNC: 3.8 MMOL/L (ref 3.4–5.3)
RBC # BLD AUTO: 4.61 10E12/L (ref 3.8–5.2)
SODIUM SERPL-SCNC: 140 MMOL/L (ref 133–144)
SOURCE: NORMAL
SP GR UR STRIP: 1.02 (ref 1–1.03)
UROBILINOGEN UR STRIP-ACNC: 0.2 EU/DL (ref 0.2–1)
WBC # BLD AUTO: 7.2 10E9/L (ref 4–11)

## 2018-02-08 PROCEDURE — 74177 CT ABD & PELVIS W/CONTRAST: CPT

## 2018-02-08 PROCEDURE — 25000128 H RX IP 250 OP 636: Performed by: EMERGENCY MEDICINE

## 2018-02-08 PROCEDURE — 80048 BASIC METABOLIC PNL TOTAL CA: CPT | Performed by: EMERGENCY MEDICINE

## 2018-02-08 PROCEDURE — 25000125 ZZHC RX 250: Performed by: EMERGENCY MEDICINE

## 2018-02-08 PROCEDURE — 99285 EMERGENCY DEPT VISIT HI MDM: CPT | Mod: 25

## 2018-02-08 PROCEDURE — 81003 URINALYSIS AUTO W/O SCOPE: CPT | Performed by: EMERGENCY MEDICINE

## 2018-02-08 PROCEDURE — 85025 COMPLETE CBC W/AUTO DIFF WBC: CPT | Performed by: EMERGENCY MEDICINE

## 2018-02-08 RX ORDER — HYDROCODONE BITARTRATE AND ACETAMINOPHEN 5; 325 MG/1; MG/1
1-2 TABLET ORAL EVERY 4 HOURS PRN
Qty: 15 TABLET | Refills: 0 | Status: SHIPPED | OUTPATIENT
Start: 2018-02-08 | End: 2019-03-25

## 2018-02-08 RX ORDER — IOPAMIDOL 755 MG/ML
85 INJECTION, SOLUTION INTRAVASCULAR ONCE
Status: COMPLETED | OUTPATIENT
Start: 2018-02-08 | End: 2018-02-08

## 2018-02-08 RX ADMIN — SODIUM CHLORIDE, PRESERVATIVE FREE 66 ML: 5 INJECTION INTRAVENOUS at 18:38

## 2018-02-08 RX ADMIN — IOPAMIDOL 85 ML: 755 INJECTION, SOLUTION INTRAVENOUS at 18:38

## 2018-02-08 ASSESSMENT — ENCOUNTER SYMPTOMS
CONSTIPATION: 0
ABDOMINAL PAIN: 1
DYSURIA: 0

## 2018-02-08 NOTE — ED AVS SNAPSHOT
Emergency Department    [address] [facility] [address]    Phone:  [phone]    Fax:  [phone]                                       Ashley Chauhan   MRN: [id number]    Department:   Emergency Department   Date of Visit:  2/8/2018           After Visit Summary Signature Page     I have received my discharge instructions, and my questions have been answered. I have discussed any challenges I see with this plan with the nurse or doctor.    ..........................................................................................................................................  Patient/Patient Representative Signature      ..........................................................................................................................................  Patient Representative Print Name and Relationship to Patient    ..................................................               ................................................  Date                                            Time    ..........................................................................................................................................  Reviewed by Signature/Title    ...................................................              ..............................................  Date                                                            Time

## 2018-02-08 NOTE — ED AVS SNAPSHOT
Emergency Department    [address] [facility] [address]    Phone:  [phone]    Fax:  [phone]                                       Ashley Chauhan   MRN: [id number]    Department:   Emergency Department   Date of Visit:  2/8/2018           Patient Information     Date Of Birth          1990        Your diagnoses for this visit were:     Abdominal pain, left lower quadrant        You were seen by Levi Martinez MD.      Follow-up Information     Follow up with [facility].    Contact information:    [address] [facility] [id number] [phone]          Discharge Instructions       Discharge Instructions  Abdominal Pain    Abdominal pain can be caused by many things. Your evaluation today does not show the exact cause for your pain. Your doctor today has decided that it is unlikely your pain is due to a life threatening problem, or a problem requiring surgery or hospital admission. Sometimes those problems cannot be found right away, so it is very important that you follow up as directed.  Sometimes only the changes which occur over time allow the cause of your pain to be found.    Return to the Emergency Department for a recheck in 8-12 hours if your pain continues.  If your pain gets worse, changes in location, or feels different, return to the Emergency Department right away.    ADULTS:  Return to the Emergency Department right away if:      You get an oral temperature above 102oF or as directed by your doctor.    You have blood in your stools (bright red or black, tarry stools).    You keep throwing up or can t drink liquids.    You see blood when you throw up.    You can t have a bowel movement or you can t pass gas.    Your stomach gets bloated or bigger.    Your skin or the whites of your eyes look yellow.    You faint.    You have bloody, frequent or painful urination.    You have new symptoms or anything that worries you.    CHILDREN:   Return to the Emergency Department right away if your child has any of the above-listed symptoms or the following:      Pushes your hand away or screams/cries when his/her belly is touched.    You notice your child is very fussy or weak.    Your child is very tired and is too tired to eat or drink.    Your child is dehydrated.  Signs of dehydration can be:  o Your infant has had no wet diapers in 4-5 hours.  o Your older child has not passed urine in 6-8 hours.  o Your infant or child starts to have dry mouth and lips, or no saliva or tears.    PREGNANT WOMEN:  Return to the Emergency Department right away if you have any of the above-listed symptoms or the following:      You have bleeding, leaking fluid or passing tissue from the vagina.    You have worse pain or cramping, or pain in your shoulder or back.    You have vomiting that will not stop.    You have painful or bloody urination.    You have a temperature of 100oF or more.    Your baby is not moving as much as usual.    You faint.    You get a bad headache with or without eye problems and abdominal pain.    You have a convulsion or seizure.    You have unusual discharge from your vagina and abdominal pain.    Abdominal pain is pretty common during pregnancy.  Your pain may or may not be related to your pregnancy. You should follow-up closely with your OB doctor so they can evaluate you and your baby.  Until you follow-up with your regular doctor, do the following:       Avoid sex and do not put anything in your vagina.    Drink clear fluids.    Only take medications approved by your doctor.    MORE INFORMATION:    Appendicitis:  A possible cause of abdominal pain in any person who still has their appendix is acute appendicitis. Appendicitis is often hard to diagnose.  Testing does not always rule out early appendicitis or other causes of abdominal pain. Close follow-up with your doctor and re-evaluations may be needed to figure out the reason for your  "abdominal pain.    Follow-up:  It is very important that you make an appointment with your clinic and go to the appointment.  If you do not follow-up with your primary doctor, it may result in missing an important development which could result in permanent injury or disability and/or lasting pain.  If there is any problem keeping your appointment, call your doctor or return to the Emergency Department.    Medications:  Take your medications as directed by your doctor today.  Before using over-the-counter medications, ask your doctor and make sure to take the medications as directed.  If you have any questions about medications, ask your doctor.    Diet:  Resume your normal diet as much as possible, but do not eat fried, fatty or spicy foods while you have pain.  Do not drink alcohol or have caffeine.  Do not smoke tobacco.    Probiotics: If you have been given an antibiotic, you may want to also take a probiotic pill or eat yogurt with live cultures. Probiotics have \"good bacteria\" to help your intestines stay healthy. Studies have shown that probiotics help prevent diarrhea and other intestine problems (including C. diff infection) when you take antibiotics. You can buy these without a prescription in the pharmacy section of the store.     If you were given a prescription for medicine here today, be sure to read all of the information (including the package insert) that comes with your prescription.  This will include important information about the medicine, its side effects, and any warnings that you need to know about.  The pharmacist who fills the prescription can provide more information and answer questions you may have about the medicine.  If you have questions or concerns that the pharmacist cannot address, please call or return to the Emergency Department.         Opioid Medication Information    Pain medications are among the most commonly prescribed medicines, so we are including this information for " all our patients. If you did not receive pain medication or get a prescription for pain medicine, you can ignore it.     You may have been given a prescription for an opioid (narcotic) pain medicine and/or have received a pain medicine while here in the Emergency Department. These medicines can make you drowsy or impaired. You must not drive, operate dangerous equipment, or engage in any other dangerous activities while taking these medications. If you drive while taking these medications, you could be arrested for DUI, or driving under the influence. Do not drink any alcohol while you are taking these medications.     Opioid pain medications can cause addiction. If you have a history of chemical dependency of any type, you are at a higher risk of becoming addicted to pain medications.  Only take these prescribed medications to treat your pain when all other options have been tried. Take it for as short a time and as few doses as possible. Store your pain pills in a secure place, as they are frequently stolen and provide a dangerous opportunity for children or visitors in your house to start abusing these powerful medications. We will not replace any lost or stolen medicine.  As soon as your pain is better, you should flush all your remaining medication.     Many prescription pain medications contain Tylenol  (acetaminophen), including Vicodin , Tylenol #3 , Norco , Lortab , and Percocet .  You should not take any extra pills of Tylenol  if you are using these prescription medications or you can get very sick.  Do not ever take more than 3000 mg of acetaminophen in any 24 hour period.    All opioids tend to cause constipation. Drink plenty of water and eat foods that have a lot of fiber, such as fruits, vegetables, prune juice, apple juice and high fiber cereal.  Take a laxative if you don t move your bowels at least every other day. Miralax , Milk of Magnesia, Colace , or Senna  can be used to keep you regular.       Remember that you can always come back to the Emergency Department if you are not able to see your regular doctor in the amount of time listed above, if you get any new symptoms, or if there is anything that worries you.          24 Hour Appointment Hotline       To make an appointment at any [facility], call [phone] ([phone]). If you don't have a family doctor or clinic, we will help you find one. [facility] are conveniently located to serve the needs of you and your family.             Review of your medicines      START taking        Dose / Directions Last dose taken    HYDROcodone-acetaminophen 5-325 MG per tablet   Commonly known as:  NORCO   Dose:  1-2 tablet   Quantity:  15 tablet        Take 1-2 tablets by mouth every 4 hours as needed for moderate to severe pain   Refills:  0          Our records show that you are taking the medicines listed below. If these are incorrect, please call your family doctor or clinic.        Dose / Directions Last dose taken    ALBUTEROL IN        Refills:  0        FLEXERIL PO        Refills:  0        FLINSTONES GUMMIES OMEGA-3 DHA Chew        Refills:  0        guaiFENesin-codeine 100-10 MG/5ML Soln solution   Commonly known as:  ROBITUSSIN AC   Dose:  1-2 tsp.   Quantity:  240 mL        Take 5-10 mLs by mouth every 4 hours as needed for cough   Refills:  0        lidocaine 5 % Patch   Commonly known as:  LIDODERM   Quantity:  10 patch        Apply 1 patch to painful area for up to 12 h within a 24 h period.  Remove after 12 hours.   Refills:  0        ondansetron 4 MG ODT tab   Commonly known as:  ZOFRAN ODT   Dose:  4 mg   Quantity:  10 tablet        Take 1 tablet (4 mg) by mouth every 6 hours as needed for nausea   Refills:  0        TRAMADOL HCL PO   Dose:  50 mg        Take 50 mg by mouth   Refills:  0        UNABLE TO FIND        MEDICATION NAME: Oral contraceptive-unknown name   Refills:  0        valACYclovir 1000 mg tablet   Commonly  known as:  VALTREX   Dose:  1000 mg   Quantity:  21 tablet        Take 1 tablet (1,000 mg) by mouth 3 times daily for 7 days   Refills:  0                Prescriptions were sent or printed at these locations (1 Prescription)                   Other Prescriptions                Printed at Department/Unit printer (1 of 1)         HYDROcodone-acetaminophen (NORCO) 5-325 MG per tablet                Procedures and tests performed during your visit     *UA reflex to Microscopic    Basic metabolic panel    CBC with platelets differential    CT Abdomen Pelvis w Contrast    Give 20 ounces of water 15 minutes before CT of abdomen    HCG qualitative urine      Orders Needing Specimen Collection     None      Pending Results     Date and Time Order Name Status Description    2/8/2018 1737 CT Abdomen Pelvis w Contrast Preliminary             Pending Culture Results     No orders found from 2/6/2018 to 2/9/2018.            Pending Results Instructions     If you had any lab results that were not finalized at the time of your Discharge, you can call the ED Lab Result RN at [phone]. You will be contacted by this team for any positive Lab results or changes in treatment. The nurses are available 7 days a week from 10A to 6:30P.  You can leave a message 24 hours per day and they will return your call.        Test Results From Your Hospital Stay        2/8/2018  6:03 PM      Component Results     Component Value Ref Range & Units Status    WBC 7.2 4.0 - 11.0 10e9/L Final    RBC Count 4.61 3.8 - 5.2 10e12/L Final    Hemoglobin 14.7 11.7 - 15.7 g/dL Final    Hematocrit 42.4 35.0 - 47.0 % Final    MCV 92 78 - 100 fl Final    MCH 31.9 26.5 - 33.0 pg Final    MCHC 34.7 31.5 - 36.5 g/dL Final    RDW 13.0 10.0 - 15.0 % Final    Platelet Count 191 150 - 450 10e9/L Final    Diff Method Automated Method  Final    % Neutrophils 52.2 % Final    % Lymphocytes 38.6 % Final    % Monocytes 7.5 % Final    % Eosinophils 1.2 % Final    % Basophils  0.4 % Final    % Immature Granulocytes 0.1 % Final    Nucleated RBCs 0 0 /100 Final    Absolute Neutrophil 3.8 1.6 - 8.3 10e9/L Final    Absolute Lymphocytes 2.8 0.8 - 5.3 10e9/L Final    Absolute Monocytes 0.5 0.0 - 1.3 10e9/L Final    Absolute Eosinophils 0.1 0.0 - 0.7 10e9/L Final    Absolute Basophils 0.0 0.0 - 0.2 10e9/L Final    Abs Immature Granulocytes 0.0 0 - 0.4 10e9/L Final    Absolute Nucleated RBC 0.0  Final         2/8/2018  6:26 PM      Component Results     Component Value Ref Range & Units Status    Sodium 140 133 - 144 mmol/L Final    Potassium 3.8 3.4 - 5.3 mmol/L Final    Chloride 109 94 - 109 mmol/L Final    Carbon Dioxide 25 20 - 32 mmol/L Final    Anion Gap 6 3 - 14 mmol/L Final    Glucose 88 70 - 99 mg/dL Final    Urea Nitrogen 10 7 - 30 mg/dL Final    Creatinine 0.70 0.52 - 1.04 mg/dL Final    GFR Estimate >90 >60 mL/min/1.7m2 Final    Non  GFR Calc    GFR Estimate If Black >90 >60 mL/min/1.7m2 Final    African American GFR Calc    Calcium 8.5 8.5 - 10.1 mg/dL Final         2/8/2018  7:13 PM      Narrative     CT ABDOMEN AND PELVIS WITH CONTRAST   2/8/2018 6:47 PM     HISTORY: Left-sided abdominal pain.    COMPARISON: None.    TECHNIQUE: Following the uneventful administration of intravenous  contrast, helical sections were acquired from the top of the diaphragm  through the pubic symphysis. Coronal reconstructions were generated.  Radiation dose for this scan was reduced using automated exposure  control, adjustment of the mA and/or kV according to the patient's  size, or iterative reconstruction technique.    FINDINGS:   Abdomen: The liver, spleen, pancreas, adrenal glands and right kidney  are unremarkable. A subcentimeter low-attenuation lesion in the upper  pole of the left kidney, too small to characterize. The gallbladder is  present. No enlarged lymph nodes or free fluid in the upper abdomen.    Scan through the lower chest is unremarkable.    Pelvis: The small and  large bowel are normal in caliber. The appendix  is unremarkable. No bowel wall thickening, pneumatosis or free  intraperitoneal gas. An intrauterine device is present in the central  uterus. No enlarged lymph nodes or free fluid in the pelvis.        Impression     IMPRESSION: No cause of acute pain identified in the abdomen or  pelvis.          2/8/2018  5:59 PM      Component Results     Component Value Ref Range & Units Status    HCG Qual Urine Negative NEG^Negative Final    This test is for screening purposes.  Results should be interpreted along with   the clinical picture.  Confirmation testing is available if warranted by   ordering AAD249, HCG Quantitative Pregnancy.           2/8/2018  5:58 PM      Component Results     Component Value Ref Range & Units Status    Color Urine Yellow  Final    Appearance Urine Clear  Final    Glucose Urine Negative NEG^Negative mg/dL Final    Bilirubin Urine Negative NEG^Negative Final    Ketones Urine Negative NEG^Negative mg/dL Final    Specific Gravity Urine 1.020 1.003 - 1.035 Final    Blood Urine Negative NEG^Negative Final    pH Urine 7.0 5.0 - 7.0 pH Final    Protein Albumin Urine Negative NEG^Negative mg/dL Final    Urobilinogen Urine 0.2 0.2 - 1.0 EU/dL Final    Nitrite Urine Negative NEG^Negative Final    Leukocyte Esterase Urine Negative NEG^Negative Final    Source Midstream Urine  Final                Clinical Quality Measure: Blood Pressure Screening     Your blood pressure was checked while you were in the emergency department today. The last reading we obtained was  BP: 140/81 . Please read the guidelines below about what these numbers mean and what you should do about them.  If your systolic blood pressure (the top number) is less than 120 and your diastolic blood pressure (the bottom number) is less than 80, then your blood pressure is normal. There is nothing more that you need to do about it.  If your systolic blood pressure (the top number) is 120-139 or  "your diastolic blood pressure (the bottom number) is 80-89, your blood pressure may be higher than it should be. You should have your blood pressure rechecked within a year by a primary care provider.  If your systolic blood pressure (the top number) is 140 or greater or your diastolic blood pressure (the bottom number) is 90 or greater, you may have high blood pressure. High blood pressure is treatable, but if left untreated over time it can put you at risk for heart attack, stroke, or kidney failure. You should have your blood pressure rechecked by a primary care provider within the next 4 weeks.  If your provider in the emergency department today gave you specific instructions to follow-up with your doctor or provider even sooner than that, you should follow that instruction and not wait for up to 4 weeks for your follow-up visit.        Thank you for choosing [facility]       Thank you for choosing [facility] for your care. Our goal is always to provide you with excellent care. Hearing back from our patients is one way we can continue to improve our services. Please take a few minutes to complete the written survey that you may receive in the mail after you visit with us. Thank you!        [employer] Information     [employer] lets you send messages to your doctor, view your test results, renew your prescriptions, schedule appointments and more. To sign up, go to [email or web address] . Click on \"Log in\" on the left side of the screen, which will take you to the Welcome page. Then click on \"Sign up Now\" on the right side of the page.     You will be asked to enter the access code listed below, as well as some personal information. Please follow the directions to create your username and password.     Your access code is: [email or web address]  Expires: 2018  7:39 PM     Your access code will  in 90 days. If you need help or a new code, please call your [facility] clinic or [phone].        Care EveryWhere ID     This " is your Care EveryWhere ID. This could be used by other organizations to access your [facility] medical records  [id number]        Equal Access to Services     KALEIGH TAVAREZ : Hadii vannessa Sepulveda, sravanthi zuluaga, foreign rothman, arnie ziegler. So [facility] [phone].    ATENCIÓN: Si habla español, tiene a flowers disposición servicios gratuitos de asistencia lingüística. Llame al [phone].    We comply with applicable federal civil rights laws and [place] laws. We do not discriminate on the basis of race, color, national origin, age, disability, sex, sexual orientation, or gender identity.            After Visit Summary       This is your record. Keep this with you and show to your community pharmacist(s) and doctor(s) at your next visit.

## 2018-02-08 NOTE — ED PROVIDER NOTES
History     Chief Complaint:  Abdominal Pain     HPI   Ashley Chauhan is a 27 year old female with a history of GERD who presents to the emergency department today for evaluation of abdominal pain. The patient reports she has been having night sweats for the past week and has been having heart burn for the past week. Two days ago, she had a sudden onset of lower left abdominal pain while at work. Today, she was seen at a clinic for her abdominal pain and was referred to the emergency department. She denies vaginal discharge, bleeding dysuria, vomiting, or constipation.     Allergies:  Aspirin  Amoxicillin  Lortab [Hydrocodone-Acetaminophen]  Penicillin G    Medications:    Robitussin  Zofran  Flexeril  Tramadol  Albuterol  Lidoderm    Past Medical History:    Asthma      GERD (gastroesophageal reflux disease)      Anxiety and depression      Endometriosis      HELLP syndrome    Shoulder pain, right    Past Surgical History:    Laparoscopic cystectomy   section    Family History:    Bipolar Disorder Father     Diabetes  Mother      Heart Disease  Mother      Hypertension  Mother      Ovarian Cancer Sister    Social History:  The patient was accompanied to the ED by sandra zavala.  Smoking status: Current Every Day Smoker   Packs/day: 0.25   Years: 10.00  Smokeless tobacco: Never Used  Alcohol use: Yes    Comment: 1-2 drinks/week on average  Marital Status:       Review of Systems   Gastrointestinal: Positive for abdominal pain. Negative for constipation.   Genitourinary: Negative for dysuria, vaginal bleeding and vaginal discharge.   All other systems reviewed and are negative.    Physical Exam   /81  Temp 98.4  F (36.9  C) (Temporal)  Resp 18  Wt 77.1 kg (170 lb)  SpO2 100%  BMI 33.2 kg/m2    Physical Exam  GENERAL: well developed, pleasant  HEAD: atraumatic  EYES: pupils reactive, extraocular muscles intact, conjunctivae normal  ENT:  mucus membranes moist  NECK:  trachea midline,  normal range of motion  RESPIRATORY: no tachypnea, breath sounds clear to auscultation   CVS: normal S1/S2, no murmurs, intact distal pulses  ABDOMEN: Left sided abdominal pain upper mid and lower. Mild CVA tendernessoft, nontender, nondistention  MUSCULOSKELETAL: no deformities  SKIN: warm and dry, no acute rashes or ulceration  NEURO: GCS 15, cranial nerves intact, alert and oriented x3  PSYCH:  Mood/affect normal    Emergency Department Course     Imaging:  Radiology findings were communicated with the patient who voiced understanding of the findings.    CT Abdominal Pelvis w/ Contrast   IMPRESSION: No cause of acute pain identified in the abdomen or  pelvis.   Report per radiology     Laboratory:  Laboratory findings were communicated with the patient who voiced understanding of the findings.  CBC: AWNL (WBC 7.2, HGB 14.7,   BMP: AWNL (Creatinine 0.70)  HCG Qualitative: Negative  UA reflex: Yellow, clear    Emergency Department Course:  Nursing notes and vitals reviewed.  I performed an exam of the patient as documented above.   The patient was sent for a CT Abdominal Pelvis w/ Contrast  while in the emergency department, results above.   IV was inserted and blood was drawn for laboratory testing, results above.    1939 Patient rechecked and updated.     I personally reviewed the laboratory and imaging results with the Patient and answered all related questions prior to discharge.  Findings and plan explained to the Patient. Patient discharged home with instructions regarding supportive care, medications, and reasons to return. The importance of close follow-up was reviewed. The patient was prescribed Norco.     Impression & Plan      Medical Decision Making:  Ashley Chauhan is a 27 year old female who presents with left sided abdominal pain, extending from LUQ to LLQ. CT was obtained and showed no acute findings. She doesn't not have any vaginal symptoms to warrant a pelvic exam. Discussed with  her the normal findings and continue to monitor and return it worse. Do not feel she needs a US of her ovaries as the pain goes up to the upper left quadrant.     Diagnosis:      1. Abdominal pain, left lower quadrant     Disposition:   Discharged to home     Discharge Medications:  Discharge Medication List as of 2/8/2018  7:39 PM      START taking these medications    Details   HYDROcodone-acetaminophen (NORCO) 5-325 MG per tablet Take 1-2 tablets by mouth every 4 hours as needed for moderate to severe pain, Disp-15 tablet, R-0, Local Print           Scribe Disclosure:  I, Rico Guzman, am serving as a scribe at 6:03 PM on 2/8/2018 to document services personally performed by Levi Martinez MD based on my observations and the provider's statements to me.      EMERGENCY DEPARTMENT       Levi Martinez MD  02/09/18 [id number]

## 2018-03-31 ENCOUNTER — HOSPITAL ENCOUNTER (EMERGENCY)
Facility: CLINIC | Age: 28
Discharge: HOME | End: 2018-03-31
Attending: EMERGENCY MEDICINE | Admitting: EMERGENCY MEDICINE
Payer: COMMERCIAL

## 2018-03-31 VITALS
HEART RATE: 87 BPM | RESPIRATION RATE: 20 BRPM | DIASTOLIC BLOOD PRESSURE: 83 MMHG | SYSTOLIC BLOOD PRESSURE: 117 MMHG | OXYGEN SATURATION: 99 % | TEMPERATURE: 98.4 F

## 2018-03-31 DIAGNOSIS — A08.4 VIRAL GASTROENTERITIS: ICD-10-CM

## 2018-03-31 LAB
ALBUMIN SERPL-MCNC: 3.7 G/DL (ref 3.4–5)
ALP SERPL-CCNC: 66 U/L (ref 40–150)
ALT SERPL W P-5'-P-CCNC: 12 U/L (ref 0–50)
ANION GAP SERPL CALCULATED.3IONS-SCNC: 5 MMOL/L (ref 3–14)
AST SERPL W P-5'-P-CCNC: 9 U/L (ref 0–45)
BASOPHILS # BLD AUTO: 0 10E9/L (ref 0–0.2)
BASOPHILS NFR BLD AUTO: 0.2 %
BILIRUB SERPL-MCNC: 0.7 MG/DL (ref 0.2–1.3)
BUN SERPL-MCNC: 15 MG/DL (ref 7–30)
CALCIUM SERPL-MCNC: 8.5 MG/DL (ref 8.5–10.1)
CHLORIDE SERPL-SCNC: 108 MMOL/L (ref 94–109)
CO2 SERPL-SCNC: 24 MMOL/L (ref 20–32)
CREAT SERPL-MCNC: 0.71 MG/DL (ref 0.52–1.04)
DIFFERENTIAL METHOD BLD: NORMAL
EOSINOPHIL # BLD AUTO: 0 10E9/L (ref 0–0.7)
EOSINOPHIL NFR BLD AUTO: 0.4 %
ERYTHROCYTE [DISTWIDTH] IN BLOOD BY AUTOMATED COUNT: 13.2 % (ref 10–15)
GFR SERPL CREATININE-BSD FRML MDRD: >90 ML/MIN/1.7M2
GLUCOSE SERPL-MCNC: 95 MG/DL (ref 70–99)
HCT VFR BLD AUTO: 44.2 % (ref 35–47)
HGB BLD-MCNC: 15.7 G/DL (ref 11.7–15.7)
IMM GRANULOCYTES # BLD: 0 10E9/L (ref 0–0.4)
IMM GRANULOCYTES NFR BLD: 0.2 %
LYMPHOCYTES # BLD AUTO: 1.5 10E9/L (ref 0.8–5.3)
LYMPHOCYTES NFR BLD AUTO: 17.7 %
MCH RBC QN AUTO: 32 PG (ref 26.5–33)
MCHC RBC AUTO-ENTMCNC: 35.5 G/DL (ref 31.5–36.5)
MCV RBC AUTO: 90 FL (ref 78–100)
MONOCYTES # BLD AUTO: 0.8 10E9/L (ref 0–1.3)
MONOCYTES NFR BLD AUTO: 9.4 %
NEUTROPHILS # BLD AUTO: 6 10E9/L (ref 1.6–8.3)
NEUTROPHILS NFR BLD AUTO: 72.1 %
NRBC # BLD AUTO: 0 10*3/UL
NRBC BLD AUTO-RTO: 0 /100
PLATELET # BLD AUTO: 154 10E9/L (ref 150–450)
POTASSIUM SERPL-SCNC: 3.7 MMOL/L (ref 3.4–5.3)
PROT SERPL-MCNC: 7 G/DL (ref 6.8–8.8)
RBC # BLD AUTO: 4.9 10E12/L (ref 3.8–5.2)
SODIUM SERPL-SCNC: 137 MMOL/L (ref 133–144)
WBC # BLD AUTO: 8.3 10E9/L (ref 4–11)

## 2018-03-31 PROCEDURE — 96361 HYDRATE IV INFUSION ADD-ON: CPT

## 2018-03-31 PROCEDURE — 80053 COMPREHEN METABOLIC PANEL: CPT | Performed by: EMERGENCY MEDICINE

## 2018-03-31 PROCEDURE — 99284 EMERGENCY DEPT VISIT MOD MDM: CPT | Mod: 25

## 2018-03-31 PROCEDURE — 85025 COMPLETE CBC W/AUTO DIFF WBC: CPT | Performed by: EMERGENCY MEDICINE

## 2018-03-31 PROCEDURE — 96374 THER/PROPH/DIAG INJ IV PUSH: CPT

## 2018-03-31 PROCEDURE — 25000128 H RX IP 250 OP 636: Performed by: EMERGENCY MEDICINE

## 2018-03-31 RX ORDER — ONDANSETRON 2 MG/ML
4 INJECTION INTRAMUSCULAR; INTRAVENOUS EVERY 30 MIN PRN
Status: DISCONTINUED | OUTPATIENT
Start: 2018-03-31 | End: 2018-03-31 | Stop reason: HOSPADM

## 2018-03-31 RX ORDER — ONDANSETRON 4 MG/1
4 TABLET, ORALLY DISINTEGRATING ORAL EVERY 8 HOURS PRN
Qty: 10 TABLET | Refills: 0 | Status: SHIPPED | OUTPATIENT
Start: 2018-03-31 | End: 2018-04-03

## 2018-03-31 RX ADMIN — ONDANSETRON 4 MG: 2 INJECTION INTRAMUSCULAR; INTRAVENOUS at 19:14

## 2018-03-31 RX ADMIN — SODIUM CHLORIDE 1000 ML: 9 INJECTION, SOLUTION INTRAVENOUS at 19:05

## 2018-03-31 ASSESSMENT — ENCOUNTER SYMPTOMS
DIARRHEA: 1
NAUSEA: 1
VOMITING: 1
ABDOMINAL PAIN: 1

## 2018-03-31 NOTE — ED AVS SNAPSHOT
Emergency Department    [address] [facility] [address]    Phone:  [phone]    Fax:  [phone]                                       Ashley Chauhan   MRN: [id number]    Department:   Emergency Department   Date of Visit:  3/31/2018           Patient Information     Date Of Birth          1990        Your diagnoses for this visit were:     Viral gastroenteritis        You were seen by Odilon Pablo MD.      Follow-up Information     Follow up with [facility]. Schedule an appointment as soon as possible for a visit in 2 days.    Why:  As needed, For repeat evaluation and symptom check    Contact information:    [address] [facility] [address]  [phone]          Follow up with  Emergency Department.    Specialty:  EMERGENCY MEDICINE    Why:  If symptoms worsen    Contact information:    [address] [facility] [id number] [phone]        Discharge Instructions         Viral Gastroenteritis (Adult)    Gastroenteritis is commonly called the stomach flu. It is most often caused by a virus that affects the stomach and intestinal tract and usually lasts from 2 to 7 days. Common viruses causing gastroenteritis include norovirus, rotavirus, and hepatitis A. Non-viral causes of gastroenteritis include bacteria, parasites, and toxins.  The danger from repeated vomiting or diarrhea is dehydration. This is the loss of too much fluid from the body. When this occurs, body fluids must be replaced. Antibiotics do not help with this illness because it is usually viral.Simple home treatment will be helpful.  Symptoms of viral gastroenteritis may include:    Watery, loose stools    Stomach pain or abdominal cramps    Fever and chills    Nausea and vomiting    Loss of bowel control    Headache  Home care  Gastroenteritis is transmitted by contact with the stool or vomit of an infected person. This can occur from  person to person or from contact with a contaminated surface.  Follow these guidelines when caring for yourself at home:    If symptoms are severe, rest at home for the next 24 hours or until you are feeling better.    Wash your hands with soap and water or use alcohol-based  to prevent the spread of infection. Wash your hands after touching anyone who is sick.    Wash your hands or use alcohol-based  after using the toilet and before meals. Clean the toilet after each use.  Remember these tips when preparing food:    People with diarrhea should not prepare or serve food to others. When preparing foods, wash your hands before and after.    Wash your hands after using cutting boards, countertops, knives, or utensils that have been in contact with raw food.    Keep uncooked meats away from cooked and ready-to-eat foods.  Medicine  You may use acetaminophen or NSAID medicines like ibuprofen or naproxen to control fever unless another medicine was given. If you have chronic liver or kidney disease, talk with your healthcare provider before using these medicines. Also talk with your provider if you've had a stomach ulcer or gastrointestinal bleeding. Don't give aspirin to anyone under 18 years of age who is ill with a fever. It may cause severe liver damage. Don't use NSAIDS is you are already taking one for another condition (like arthritis) or are on aspirin (such as for heart disease or after a stroke).  If medicine for vomiting or diarrhea are prescribed, take these only as directed. Do not take over-the-counter medicines for vomiting or diarrhea unless instructed by your healthcare provider.  Diet  Follow these guidelines for food:    Water and liquids are important so you don't get dehydrated. Drink a small amount at a time or suck on ice chips if you are vomiting.    If you eat, avoid fatty, greasy, spicy, or fried foods.    Don't eat dairy if you have diarrhea. This can make diarrhea  worse.    Avoid tobacco, alcohol, and caffeine which may worsen symptoms.  During the first 24 hours (the first full day), follow the diet below:    Beverages. Sports drinks, soft drinks without caffeine, ginger ale, mineral water (plain or flavored), decaffeinated tea and coffee. If you are very dehydrated, sports drinks aren't a good choice. They have too much sugar and not enough electrolytes. In this case, commercially available products called oral rehydration solutions, are best.    Soups. Eat clear broth, consommé, and bouillon.    Desserts. Eat gelatin, popsicles, and fruit juice bars.  During the next 24 hours (the second day), you may add the following to the above:    Hot cereal, plain toast, bread, rolls, and crackers    Plain noodles, rice, mashed potatoes, chicken noodle or rice soup    Unsweetened canned fruit (avoid pineapple), bananas    Limit fat intake to less than 15 grams per day. Do this by avoiding margarine, butter, oils, mayonnaise, sauces, gravies, fried foods, peanut butter, meat, poultry, and fish.    Limit fiber and avoid raw or cooked vegetables, fresh fruits (except bananas), and bran cereals.    Limit caffeine and chocolate. Don't use spices or seasonings other than salt.    Limit dairy products.    Avoid alcohol.  During the next 24 hours:    Gradually resume a normal diet as you feel better and your symptoms improve.    If at any time it starts getting worse again, go back to clear liquids until you feel better.  Follow-up care  Follow up with your healthcare provider, or as advised. Call your provider if you don't get better within 24 hours or if diarrhea lasts more than a week. Also follow up if you are unable to keep down liquids and get dehydrated. If a stool (diarrhea) sample was taken, call as directed for the results.  Call 911  Call 911 if any of these occur:    Trouble breathing    Chest pain    Confused    Severe drowsiness or trouble awakening    Fainting or loss of  consciousness    Rapid heart rate    Seizure    Stiff neck  When to seek medical advice  Call your healthcare provider right away if any of these occur:    Abdominal pain that gets worse    Continued vomiting (unable to keep liquids down)    Frequent diarrhea (more than 5 times a day)    Blood in vomit or stool (black or red color)    Dark urine, reduced urine output, or extreme thirst    Weakness or dizziness    Drowsiness    Fever of 100.4 F (38 C) oral or higher that does not get better with fever medicine    New rash  Date Last Reviewed: 1/3/2016    6135-1361 [employer]. [address] [place] [address]17. All rights reserved. This information is not intended as a substitute for professional medical care. Always follow your healthcare professional's instructions.      Discharge Instructions  Abdominal Pain    Abdominal pain can be caused by many things. Your evaluation today does not show the exact cause for your pain. Your doctor today has decided that it is unlikely your pain is due to a life threatening problem, or a problem requiring surgery or hospital admission. Sometimes those problems cannot be found right away, so it is very important that you follow up as directed.  Sometimes only the changes which occur over time allow the cause of your pain to be found.    Return to the Emergency Department for a recheck in 8-12 hours if your pain continues.  If your pain gets worse, changes in location, or feels different, return to the Emergency Department right away.    ADULTS:  Return to the Emergency Department right away if:      You get an oral temperature above 102oF or as directed by your doctor.    You have blood in your stools (bright red or black, tarry stools).    You keep throwing up or can t drink liquids.    You see blood when you throw up.    You can t have a bowel movement or you can t pass gas.    Your stomach gets bloated or bigger.    Your skin or the whites of your eyes  look yellow.    You faint.    You have bloody, frequent or painful urination.    You have new symptoms or anything that worries you.    CHILDREN:  Return to the Emergency Department right away if your child has any of the above-listed symptoms or the following:      Pushes your hand away or screams/cries when his/her belly is touched.    You notice your child is very fussy or weak.    Your child is very tired and is too tired to eat or drink.    Your child is dehydrated.  Signs of dehydration can be:  o Your infant has had no wet diapers in 4-5 hours.  o Your older child has not passed urine in 6-8 hours.  o Your infant or child starts to have dry mouth and lips, or no saliva or tears.    PREGNANT WOMEN:  Return to the Emergency Department right away if you have any of the above-listed symptoms or the following:      You have bleeding, leaking fluid or passing tissue from the vagina.    You have worse pain or cramping, or pain in your shoulder or back.    You have vomiting that will not stop.    You have painful or bloody urination.    You have a temperature of 100oF or more.    Your baby is not moving as much as usual.    You faint.    You get a bad headache with or without eye problems and abdominal pain.    You have a convulsion or seizure.    You have unusual discharge from your vagina and abdominal pain.    Abdominal pain is pretty common during pregnancy.  Your pain may or may not be related to your pregnancy. You should follow-up closely with your OB doctor so they can evaluate you and your baby.  Until you follow-up with your regular doctor, do the following:       Avoid sex and do not put anything in your vagina.    Drink clear fluids.    Only take medications approved by your doctor.    MORE INFORMATION:    Appendicitis:  A possible cause of abdominal pain in any person who still has their appendix is acute appendicitis. Appendicitis is often hard to diagnose.  Testing does not always rule out early  "appendicitis or other causes of abdominal pain. Close follow-up with your doctor and re-evaluations may be needed to figure out the reason for your abdominal pain.    Follow-up:  It is very important that you make an appointment with your clinic and go to the appointment.  If you do not follow-up with your primary doctor, it may result in missing an important development which could result in permanent injury or disability and/or lasting pain.  If there is any problem keeping your appointment, call your doctor or return to the Emergency Department.    Medications:  Take your medications as directed by your doctor today.  Before using over-the-counter medications, ask your doctor and make sure to take the medications as directed.  If you have any questions about medications, ask your doctor.    Diet:  Resume your normal diet as much as possible, but do not eat fried, fatty or spicy foods while you have pain.  Do not drink alcohol or have caffeine.  Do not smoke tobacco.    Probiotics: If you have been given an antibiotic, you may want to also take a probiotic pill or eat yogurt with live cultures. Probiotics have \"good bacteria\" to help your intestines stay healthy. Studies have shown that probiotics help prevent diarrhea and other intestine problems (including C. diff infection) when you take antibiotics. You can buy these without a prescription in the pharmacy section of the store.     If you were given a prescription for medicine here today, be sure to read all of the information (including the package insert) that comes with your prescription.  This will include important information about the medicine, its side effects, and any warnings that you need to know about.  The pharmacist who fills the prescription can provide more information and answer questions you may have about the medicine.  If you have questions or concerns that the pharmacist cannot address, please call or return to the Emergency Department. "         Opioid Medication Information    Pain medications are among the most commonly prescribed medicines, so we are including this information for all our patients. If you did not receive pain medication or get a prescription for pain medicine, you can ignore it.     You may have been given a prescription for an opioid (narcotic) pain medicine and/or have received a pain medicine while here in the Emergency Department. These medicines can make you drowsy or impaired. You must not drive, operate dangerous equipment, or engage in any other dangerous activities while taking these medications. If you drive while taking these medications, you could be arrested for DUI, or driving under the influence. Do not drink any alcohol while you are taking these medications.     Opioid pain medications can cause addiction. If you have a history of chemical dependency of any type, you are at a higher risk of becoming addicted to pain medications.  Only take these prescribed medications to treat your pain when all other options have been tried. Take it for as short a time and as few doses as possible. Store your pain pills in a secure place, as they are frequently stolen and provide a dangerous opportunity for children or visitors in your house to start abusing these powerful medications. We will not replace any lost or stolen medicine.  As soon as your pain is better, you should flush all your remaining medication.     Many prescription pain medications contain Tylenol  (acetaminophen), including Vicodin , Tylenol #3 , Norco , Lortab , and Percocet .  You should not take any extra pills of Tylenol  if you are using these prescription medications or you can get very sick.  Do not ever take more than 3000 mg of acetaminophen in any 24 hour period.    All opioids tend to cause constipation. Drink plenty of water and eat foods that have a lot of fiber, such as fruits, vegetables, prune juice, apple juice and high fiber cereal.  Take a  laxative if you don t move your bowels at least every other day. Miralax , Milk of Magnesia, Colace , or Senna  can be used to keep you regular.      Remember that you can always come back to the Emergency Department if you are not able to see your regular doctor in the amount of time listed above, if you get any new symptoms, or if there is anything that worries you.          24 Hour Appointment Hotline       To make an appointment at any [facility], call [phone] ([phone]). If you don't have a family doctor or clinic, we will help you find one. [facility] are conveniently located to serve the needs of you and your family.             Review of your medicines      CONTINUE these medicines which may have CHANGED, or have new prescriptions. If we are uncertain of the size of tablets/capsules you have at home, strength may be listed as something that might have changed.        Dose / Directions Last dose taken    * ondansetron 4 MG ODT tab   Commonly known as:  ZOFRAN ODT   Dose:  4 mg   What changed:  Another medication with the same name was added. Make sure you understand how and when to take each.   Quantity:  10 tablet        Take 1 tablet (4 mg) by mouth every 6 hours as needed for nausea   Refills:  0        * ondansetron 4 MG ODT tab   Commonly known as:  ZOFRAN ODT   Dose:  4 mg   What changed:  You were already taking a medication with the same name, and this prescription was added. Make sure you understand how and when to take each.   Quantity:  10 tablet        Take 1 tablet (4 mg) by mouth every 8 hours as needed for nausea   Refills:  0        * Notice:  This list has 2 medication(s) that are the same as other medications prescribed for you. Read the directions carefully, and ask your doctor or other care provider to review them with you.      Our records show that you are taking the medicines listed below. If these are incorrect, please call your family doctor or clinic.         Dose / Directions Last dose taken    ALBUTEROL IN        Refills:  0        FLEXERIL PO        Refills:  0        FLINSTONES GUMMIES OMEGA-3 DHA Chew        Refills:  0        guaiFENesin-codeine 100-10 MG/5ML Soln solution   Commonly known as:  ROBITUSSIN AC   Dose:  1-2 tsp.   Quantity:  240 mL        Take 5-10 mLs by mouth every 4 hours as needed for cough   Refills:  0        HYDROcodone-acetaminophen 5-325 MG per tablet   Commonly known as:  NORCO   Dose:  1-2 tablet   Quantity:  15 tablet        Take 1-2 tablets by mouth every 4 hours as needed for moderate to severe pain   Refills:  0        lidocaine 5 % Patch   Commonly known as:  LIDODERM   Quantity:  10 patch        Apply 1 patch to painful area for up to 12 h within a 24 h period.  Remove after 12 hours.   Refills:  0        TRAMADOL HCL PO   Dose:  50 mg        Take 50 mg by mouth   Refills:  0        UNABLE TO FIND        MEDICATION NAME: Oral contraceptive-unknown name   Refills:  0        valACYclovir 1000 mg tablet   Commonly known as:  VALTREX   Dose:  1000 mg   Quantity:  21 tablet        Take 1 tablet (1,000 mg) by mouth 3 times daily for 7 days   Refills:  0                Prescriptions were sent or printed at these locations (1 Prescription)                   Other Prescriptions                Printed at Department/Unit printer (1 of 1)         ondansetron (ZOFRAN ODT) 4 MG ODT tab                Procedures and tests performed during your visit     CBC with platelets differential    Comprehensive metabolic panel    May feed patient      Orders Needing Specimen Collection     None      Pending Results     No orders found from 3/29/2018 to 4/1/2018.            Pending Culture Results     No orders found from 3/29/2018 to 4/1/2018.            Pending Results Instructions     If you had any lab results that were not finalized at the time of your Discharge, you can call the ED Lab Result RN at [phone]. You will be contacted by this team for any  positive Lab results or changes in treatment. The nurses are available 7 days a week from 10A to 6:30P.  You can leave a message 24 hours per day and they will return your call.        Test Results From Your Hospital Stay        3/31/2018  7:17 PM      Component Results     Component Value Ref Range & Units Status    WBC 8.3 4.0 - 11.0 10e9/L Final    RBC Count 4.90 3.8 - 5.2 10e12/L Final    Hemoglobin 15.7 11.7 - 15.7 g/dL Final    Hematocrit 44.2 35.0 - 47.0 % Final    MCV 90 78 - 100 fl Final    MCH 32.0 26.5 - 33.0 pg Final    MCHC 35.5 31.5 - 36.5 g/dL Final    RDW 13.2 10.0 - 15.0 % Final    Platelet Count 154 150 - 450 10e9/L Final    Diff Method Automated Method  Final    % Neutrophils 72.1 % Final    % Lymphocytes 17.7 % Final    % Monocytes 9.4 % Final    % Eosinophils 0.4 % Final    % Basophils 0.2 % Final    % Immature Granulocytes 0.2 % Final    Nucleated RBCs 0 0 /100 Final    Absolute Neutrophil 6.0 1.6 - 8.3 10e9/L Final    Absolute Lymphocytes 1.5 0.8 - 5.3 10e9/L Final    Absolute Monocytes 0.8 0.0 - 1.3 10e9/L Final    Absolute Eosinophils 0.0 0.0 - 0.7 10e9/L Final    Absolute Basophils 0.0 0.0 - 0.2 10e9/L Final    Abs Immature Granulocytes 0.0 0 - 0.4 10e9/L Final    Absolute Nucleated RBC 0.0  Final         3/31/2018  7:33 PM      Component Results     Component Value Ref Range & Units Status    Sodium 137 133 - 144 mmol/L Final    Potassium 3.7 3.4 - 5.3 mmol/L Final    Chloride 108 94 - 109 mmol/L Final    Carbon Dioxide 24 20 - 32 mmol/L Final    Anion Gap 5 3 - 14 mmol/L Final    Glucose 95 70 - 99 mg/dL Final    Urea Nitrogen 15 7 - 30 mg/dL Final    Creatinine 0.71 0.52 - 1.04 mg/dL Final    GFR Estimate >90 >60 mL/min/1.7m2 Final    Non  GFR Calc    GFR Estimate If Black >90 >60 mL/min/1.7m2 Final    African American GFR Calc    Calcium 8.5 8.5 - 10.1 mg/dL Final    Bilirubin Total 0.7 0.2 - 1.3 mg/dL Final    Albumin 3.7 3.4 - 5.0 g/dL Final    Protein Total 7.0 6.8 -  8.8 g/dL Final    Alkaline Phosphatase 66 40 - 150 U/L Final    ALT 12 0 - 50 U/L Final    AST 9 0 - 45 U/L Final                Clinical Quality Measure: Blood Pressure Screening     Your blood pressure was checked while you were in the emergency department today. The last reading we obtained was  BP: 117/83 . Please read the guidelines below about what these numbers mean and what you should do about them.  If your systolic blood pressure (the top number) is less than 120 and your diastolic blood pressure (the bottom number) is less than 80, then your blood pressure is normal. There is nothing more that you need to do about it.  If your systolic blood pressure (the top number) is 120-139 or your diastolic blood pressure (the bottom number) is 80-89, your blood pressure may be higher than it should be. You should have your blood pressure rechecked within a year by a primary care provider.  If your systolic blood pressure (the top number) is 140 or greater or your diastolic blood pressure (the bottom number) is 90 or greater, you may have high blood pressure. High blood pressure is treatable, but if left untreated over time it can put you at risk for heart attack, stroke, or kidney failure. You should have your blood pressure rechecked by a primary care provider within the next 4 weeks.  If your provider in the emergency department today gave you specific instructions to follow-up with your doctor or provider even sooner than that, you should follow that instruction and not wait for up to 4 weeks for your follow-up visit.        Thank you for choosing [facility]       Thank you for choosing [facility] for your care. Our goal is always to provide you with excellent care. Hearing back from our patients is one way we can continue to improve our services. Please take a few minutes to complete the written survey that you may receive in the mail after you visit with us. Thank you!        [facility] Information     [employer] lets  "you send messages to your doctor, view your test results, renew your prescriptions, schedule appointments and more. To sign up, go to [email or web address] . Click on \"Log in\" on the left side of the screen, which will take you to the Welcome page. Then click on \"Sign up Now\" on the right side of the page.     You will be asked to enter the access code listed below, as well as some personal information. Please follow the directions to create your username and password.     Your access code is: [email or web address]  Expires: 2018  8:39 PM     Your access code will  in 90 days. If you need help or a new code, please call your [facility] clinic or [phone].        Care EveryWhere ID     This is your Care EveryWhere ID. This could be used by other organizations to access your [facility] medical records  [id number]        Equal Access to Services     KALEIGH TAVAREZ : Hadii vannessa marquiso Derick, waaxda lusola, qaybta kaalmada stephan, arnie huitron . So [facility] [phone].    ATENCIÓN: Si habla español, tiene a flowers disposición servicios gratuitos de asistencia lingüística. Ramon al [phone].    We comply with applicable federal civil rights laws and [place] laws. We do not discriminate on the basis of race, color, national origin, age, disability, sex, sexual orientation, or gender identity.            After Visit Summary       This is your record. Keep this with you and show to your community pharmacist(s) and doctor(s) at your next visit.                  "

## 2018-03-31 NOTE — ED PROVIDER NOTES
History     Chief Complaint:  Vomiting and Abdominal Pain      HPI   Ashley Chauhan is a 28 year old female who presents with nausea, vomiting, diarrhea, and abdominal pain. Around 2200 yesterday, the patient has had multiple episodes of nausea, vomiting and diarrhea, along with constant abdominal pain. The symptoms, especially the abdominal pain, is still present. The patient states she has had roughly 10 episodes of vomiting and diarrhea since onset.      Allergies:  Aspirin  Amoxicillin  Lortab [Hydrocodone-Acetaminophen]  Penicillin G     Medications:    Hydrocodone-acetaminophen   Ondansetron  Tramadol HCL   Cyclobenzaprine HCl   Albuterol IN  Valacyclovir     Past Medical History:    Chronic shoulder pain  Asthma    Past Surgical History:    GYN Surgery    Family History:    No past pertinent family history.    Social History:  Marital Status:   [2]  Smoking status: Current Every Day Smoker  Alcohol use: Yes       Review of Systems   Gastrointestinal: Positive for abdominal pain, diarrhea, nausea and vomiting.   All other systems reviewed and are negative.        Physical Exam   First Vitals:  BP: (!) 140/104  Pulse: 124  Temp: 98.4  F (36.9  C)  Resp: 20  SpO2: 99 %      Physical Exam  Vitals: reviewed by me  General: Pt seen on [facility], cooperative, and alert to conversation  Eyes: Tracking well, clear conjunctiva BL  ENT: MMM, midline trachea.   Lungs:   No tachypnea, no accessory muscle use. No respiratory distress.   CV: Rate as above, regular rhythm.    Abd: Soft, subjective imani umbilical and epigastric ttp but no guarding or rebound.    MSK: no peripheral edema or joint effusion.  No evidence of trauma  Skin: No rash, normal turgor and temperature  Neuro: Clear speech and no facial droop.  Psych: Not RIS, no e/o AH/VH      Emergency Department Course     Laboratory:  CBC: WBC: 8.3, HGB: 15.7, PLT: 154    CMP: o/w WNL (Creatinine: 0.71)    Interventions:  1914 Zofran 4  mg IV  1905 NS 1L IV Bolus       Emergency Department Course:  Nursing notes and vitals reviewed.     1857 I performed an exam of the patient as documented above.     IV inserted. Medicine administered as documented above.     Blood drawn. This was sent to the lab for further testing, results above.    2047 I rechecked the patient and discussed the results of her workup thus far.     Findings and plan explained to the patient. Patient discharged home with instructions regarding supportive care, medications, and reasons to return. The importance of close follow-up was reviewed. The patient was prescribed Zofran.    I personally reviewed the laboratory results with the patient and answered all related questions prior to discharge.         Impression & Plan      Medical Decision Making:  Ashley Chauhan is a 28 year old female who presents to the emergency department for evaluation of abdominal pain in the setting of perfuse vomiting and diarrhea. The patient estimates she has had a dozen very loose, watery stools. In the setting of GI intolerance and a benign abdominal exam, I do favor the diagnosis of gastroenteritis. Specifically of note, the patient has no evidence of pain in her right lower quadrant and McBurney's point, and while she does have some subjective tenderness to deep palpation of the abdomen, she again has no focal or localizing signs. Given her robust diarrhea, I see advanced imaging with a benign abdomen to be low yield. Her labs are otherwise within normal limits and are consistent with previous. I will discharge with very clear return to ED precautions, Zofran for supportive care, and primary care for follow-up.        Diagnosis:    ICD-10-CM   1. Viral gastroenteritis A08.4       Disposition:  Discharged to home.    Discharge Medications:   Details   !! ondansetron (ZOFRAN ODT) 4 MG ODT tab Take 1 tablet (4 mg) by mouth every 8 hours as needed for nausea, Disp-10 tablet, R-0, Local Print        !! - Potential duplicate medications found. Please discuss with provider.       I, Eladio Dyer, am serving as a scribe on 3/31/2018 at 6:57 PM to personally document services performed by Odilon Pablo* based on my observations and the provider's statements to me.       Eladio Dyer  3/31/2018    EMERGENCY DEPARTMENT       Odilon Pablo MD  03/31/18 [id number]

## 2018-03-31 NOTE — ED AVS SNAPSHOT
Emergency Department    [address] [facility] [address]    Phone:  [phone]    Fax:  [phone]                                       Ashley Chauhan   MRN: [id number]    Department:   Emergency Department   Date of Visit:  3/31/2018           After Visit Summary Signature Page     I have received my discharge instructions, and my questions have been answered. I have discussed any challenges I see with this plan with the nurse or doctor.    ..........................................................................................................................................  Patient/Patient Representative Signature      ..........................................................................................................................................  Patient Representative Print Name and Relationship to Patient    ..................................................               ................................................  Date                                            Time    ..........................................................................................................................................  Reviewed by Signature/Title    ...................................................              ..............................................  Date                                                            Time

## 2018-03-31 NOTE — LETTER
March 31, 2018      To Whom It May Concern:      Ashley Chauhan was seen in our Emergency Department today, 03/31/18.  I expect her condition to improve over the next 2 days.  She may return to work/school when improved.    Sincerely,        Odilon Pablo MD

## 2018-04-01 NOTE — DISCHARGE INSTRUCTIONS
Viral Gastroenteritis (Adult)    Gastroenteritis is commonly called the stomach flu. It is most often caused by a virus that affects the stomach and intestinal tract and usually lasts from 2 to 7 days. Common viruses causing gastroenteritis include norovirus, rotavirus, and hepatitis A. Non-viral causes of gastroenteritis include bacteria, parasites, and toxins.  The danger from repeated vomiting or diarrhea is dehydration. This is the loss of too much fluid from the body. When this occurs, body fluids must be replaced. Antibiotics do not help with this illness because it is usually viral.Simple home treatment will be helpful.  Symptoms of viral gastroenteritis may include:    Watery, loose stools    Stomach pain or abdominal cramps    Fever and chills    Nausea and vomiting    Loss of bowel control    Headache  Home care  Gastroenteritis is transmitted by contact with the stool or vomit of an infected person. This can occur from person to person or from contact with a contaminated surface.  Follow these guidelines when caring for yourself at home:    If symptoms are severe, rest at home for the next 24 hours or until you are feeling better.    Wash your hands with soap and water or use alcohol-based  to prevent the spread of infection. Wash your hands after touching anyone who is sick.    Wash your hands or use alcohol-based  after using the toilet and before meals. Clean the toilet after each use.  Remember these tips when preparing food:    People with diarrhea should not prepare or serve food to others. When preparing foods, wash your hands before and after.    Wash your hands after using cutting boards, countertops, knives, or utensils that have been in contact with raw food.    Keep uncooked meats away from cooked and ready-to-eat foods.  Medicine  You may use acetaminophen or NSAID medicines like ibuprofen or naproxen to control fever unless another medicine was given. If you have chronic  liver or kidney disease, talk with your healthcare provider before using these medicines. Also talk with your provider if you've had a stomach ulcer or gastrointestinal bleeding. Don't give aspirin to anyone under 18 years of age who is ill with a fever. It may cause severe liver damage. Don't use NSAIDS is you are already taking one for another condition (like arthritis) or are on aspirin (such as for heart disease or after a stroke).  If medicine for vomiting or diarrhea are prescribed, take these only as directed. Do not take over-the-counter medicines for vomiting or diarrhea unless instructed by your healthcare provider.  Diet  Follow these guidelines for food:    Water and liquids are important so you don't get dehydrated. Drink a small amount at a time or suck on ice chips if you are vomiting.    If you eat, avoid fatty, greasy, spicy, or fried foods.    Don't eat dairy if you have diarrhea. This can make diarrhea worse.    Avoid tobacco, alcohol, and caffeine which may worsen symptoms.  During the first 24 hours (the first full day), follow the diet below:    Beverages. Sports drinks, soft drinks without caffeine, ginger ale, mineral water (plain or flavored), decaffeinated tea and coffee. If you are very dehydrated, sports drinks aren't a good choice. They have too much sugar and not enough electrolytes. In this case, commercially available products called oral rehydration solutions, are best.    Soups. Eat clear broth, consommé, and bouillon.    Desserts. Eat gelatin, popsicles, and fruit juice bars.  During the next 24 hours (the second day), you may add the following to the above:    Hot cereal, plain toast, bread, rolls, and crackers    Plain noodles, rice, mashed potatoes, chicken noodle or rice soup    Unsweetened canned fruit (avoid pineapple), bananas    Limit fat intake to less than 15 grams per day. Do this by avoiding margarine, butter, oils, mayonnaise, sauces, gravies, fried foods, peanut  butter, meat, poultry, and fish.    Limit fiber and avoid raw or cooked vegetables, fresh fruits (except bananas), and bran cereals.    Limit caffeine and chocolate. Don't use spices or seasonings other than salt.    Limit dairy products.    Avoid alcohol.  During the next 24 hours:    Gradually resume a normal diet as you feel better and your symptoms improve.    If at any time it starts getting worse again, go back to clear liquids until you feel better.  Follow-up care  Follow up with your healthcare provider, or as advised. Call your provider if you don't get better within 24 hours or if diarrhea lasts more than a week. Also follow up if you are unable to keep down liquids and get dehydrated. If a stool (diarrhea) sample was taken, call as directed for the results.  Call 911  Call 911 if any of these occur:    Trouble breathing    Chest pain    Confused    Severe drowsiness or trouble awakening    Fainting or loss of consciousness    Rapid heart rate    Seizure    Stiff neck  When to seek medical advice  Call your healthcare provider right away if any of these occur:    Abdominal pain that gets worse    Continued vomiting (unable to keep liquids down)    Frequent diarrhea (more than 5 times a day)    Blood in vomit or stool (black or red color)    Dark urine, reduced urine output, or extreme thirst    Weakness or dizziness    Drowsiness    Fever of 100.4 F (38 C) oral or higher that does not get better with fever medicine    New rash  Date Last Reviewed: 1/3/2016    6592-0068 [employer]. [address] [place] [address], [place] [address]. All rights reserved. This information is not intended as a substitute for professional medical care. Always follow your healthcare professional's instructions.      Discharge Instructions  Abdominal Pain    Abdominal pain can be caused by many things. Your evaluation today does not show the exact cause for your pain. Your doctor today has decided that it is unlikely your  pain is due to a life threatening problem, or a problem requiring surgery or hospital admission. Sometimes those problems cannot be found right away, so it is very important that you follow up as directed.  Sometimes only the changes which occur over time allow the cause of your pain to be found.    Return to the Emergency Department for a recheck in 8-12 hours if your pain continues.  If your pain gets worse, changes in location, or feels different, return to the Emergency Department right away.    ADULTS:  Return to the Emergency Department right away if:      You get an oral temperature above 102oF or as directed by your doctor.    You have blood in your stools (bright red or black, tarry stools).    You keep throwing up or can t drink liquids.    You see blood when you throw up.    You can t have a bowel movement or you can t pass gas.    Your stomach gets bloated or bigger.    Your skin or the whites of your eyes look yellow.    You faint.    You have bloody, frequent or painful urination.    You have new symptoms or anything that worries you.    CHILDREN:  Return to the Emergency Department right away if your child has any of the above-listed symptoms or the following:      Pushes your hand away or screams/cries when his/her belly is touched.    You notice your child is very fussy or weak.    Your child is very tired and is too tired to eat or drink.    Your child is dehydrated.  Signs of dehydration can be:  o Your infant has had no wet diapers in 4-5 hours.  o Your older child has not passed urine in 6-8 hours.  o Your infant or child starts to have dry mouth and lips, or no saliva or tears.    PREGNANT WOMEN:  Return to the Emergency Department right away if you have any of the above-listed symptoms or the following:      You have bleeding, leaking fluid or passing tissue from the vagina.    You have worse pain or cramping, or pain in your shoulder or back.    You have vomiting that will not stop.    You  have painful or bloody urination.    You have a temperature of 100oF or more.    Your baby is not moving as much as usual.    You faint.    You get a bad headache with or without eye problems and abdominal pain.    You have a convulsion or seizure.    You have unusual discharge from your vagina and abdominal pain.    Abdominal pain is pretty common during pregnancy.  Your pain may or may not be related to your pregnancy. You should follow-up closely with your OB doctor so they can evaluate you and your baby.  Until you follow-up with your regular doctor, do the following:       Avoid sex and do not put anything in your vagina.    Drink clear fluids.    Only take medications approved by your doctor.    MORE INFORMATION:    Appendicitis:  A possible cause of abdominal pain in any person who still has their appendix is acute appendicitis. Appendicitis is often hard to diagnose.  Testing does not always rule out early appendicitis or other causes of abdominal pain. Close follow-up with your doctor and re-evaluations may be needed to figure out the reason for your abdominal pain.    Follow-up:  It is very important that you make an appointment with your clinic and go to the appointment.  If you do not follow-up with your primary doctor, it may result in missing an important development which could result in permanent injury or disability and/or lasting pain.  If there is any problem keeping your appointment, call your doctor or return to the Emergency Department.    Medications:  Take your medications as directed by your doctor today.  Before using over-the-counter medications, ask your doctor and make sure to take the medications as directed.  If you have any questions about medications, ask your doctor.    Diet:  Resume your normal diet as much as possible, but do not eat fried, fatty or spicy foods while you have pain.  Do not drink alcohol or have caffeine.  Do not smoke tobacco.    Probiotics: If you have been given  "an antibiotic, you may want to also take a probiotic pill or eat yogurt with live cultures. Probiotics have \"good bacteria\" to help your intestines stay healthy. Studies have shown that probiotics help prevent diarrhea and other intestine problems (including C. diff infection) when you take antibiotics. You can buy these without a prescription in the pharmacy section of the store.     If you were given a prescription for medicine here today, be sure to read all of the information (including the package insert) that comes with your prescription.  This will include important information about the medicine, its side effects, and any warnings that you need to know about.  The pharmacist who fills the prescription can provide more information and answer questions you may have about the medicine.  If you have questions or concerns that the pharmacist cannot address, please call or return to the Emergency Department.         Opioid Medication Information    Pain medications are among the most commonly prescribed medicines, so we are including this information for all our patients. If you did not receive pain medication or get a prescription for pain medicine, you can ignore it.     You may have been given a prescription for an opioid (narcotic) pain medicine and/or have received a pain medicine while here in the Emergency Department. These medicines can make you drowsy or impaired. You must not drive, operate dangerous equipment, or engage in any other dangerous activities while taking these medications. If you drive while taking these medications, you could be arrested for DUI, or driving under the influence. Do not drink any alcohol while you are taking these medications.     Opioid pain medications can cause addiction. If you have a history of chemical dependency of any type, you are at a higher risk of becoming addicted to pain medications.  Only take these prescribed medications to treat your pain when all other " options have been tried. Take it for as short a time and as few doses as possible. Store your pain pills in a secure place, as they are frequently stolen and provide a dangerous opportunity for children or visitors in your house to start abusing these powerful medications. We will not replace any lost or stolen medicine.  As soon as your pain is better, you should flush all your remaining medication.     Many prescription pain medications contain Tylenol  (acetaminophen), including Vicodin , Tylenol #3 , Norco , Lortab , and Percocet .  You should not take any extra pills of Tylenol  if you are using these prescription medications or you can get very sick.  Do not ever take more than 3000 mg of acetaminophen in any 24 hour period.    All opioids tend to cause constipation. Drink plenty of water and eat foods that have a lot of fiber, such as fruits, vegetables, prune juice, apple juice and high fiber cereal.  Take a laxative if you don t move your bowels at least every other day. Miralax , Milk of Magnesia, Colace , or Senna  can be used to keep you regular.      Remember that you can always come back to the Emergency Department if you are not able to see your regular doctor in the amount of time listed above, if you get any new symptoms, or if there is anything that worries you.

## 2018-07-18 ENCOUNTER — APPOINTMENT (OUTPATIENT)
Dept: ULTRASOUND IMAGING | Facility: CLINIC | Age: 28
End: 2018-07-18
Attending: EMERGENCY MEDICINE
Payer: COMMERCIAL

## 2018-07-18 ENCOUNTER — HOSPITAL ENCOUNTER (EMERGENCY)
Facility: CLINIC | Age: 28
Discharge: HOME | End: 2018-07-18
Attending: EMERGENCY MEDICINE | Admitting: EMERGENCY MEDICINE
Payer: COMMERCIAL

## 2018-07-18 VITALS
WEIGHT: 160 LBS | HEIGHT: 60 IN | OXYGEN SATURATION: 99 % | TEMPERATURE: 98.5 F | DIASTOLIC BLOOD PRESSURE: 75 MMHG | SYSTOLIC BLOOD PRESSURE: 113 MMHG | RESPIRATION RATE: 19 BRPM | BODY MASS INDEX: 31.41 KG/M2

## 2018-07-18 DIAGNOSIS — N72 CERVICITIS: ICD-10-CM

## 2018-07-18 LAB
ALBUMIN SERPL-MCNC: 4 G/DL (ref 3.4–5)
ALBUMIN UR-MCNC: NEGATIVE MG/DL
ALP SERPL-CCNC: 69 U/L (ref 40–150)
ALT SERPL W P-5'-P-CCNC: 16 U/L (ref 0–50)
ANION GAP SERPL CALCULATED.3IONS-SCNC: 7 MMOL/L (ref 3–14)
APPEARANCE UR: CLEAR
AST SERPL W P-5'-P-CCNC: 12 U/L (ref 0–45)
BASOPHILS # BLD AUTO: 0 10E9/L (ref 0–0.2)
BASOPHILS NFR BLD AUTO: 0.2 %
BILIRUB SERPL-MCNC: 0.5 MG/DL (ref 0.2–1.3)
BILIRUB UR QL STRIP: NEGATIVE
BUN SERPL-MCNC: 10 MG/DL (ref 7–30)
CALCIUM SERPL-MCNC: 9 MG/DL (ref 8.5–10.1)
CHLORIDE SERPL-SCNC: 109 MMOL/L (ref 94–109)
CO2 SERPL-SCNC: 24 MMOL/L (ref 20–32)
COLOR UR AUTO: YELLOW
CREAT SERPL-MCNC: 0.69 MG/DL (ref 0.52–1.04)
DIFFERENTIAL METHOD BLD: NORMAL
EOSINOPHIL # BLD AUTO: 0 10E9/L (ref 0–0.7)
EOSINOPHIL NFR BLD AUTO: 0.3 %
ERYTHROCYTE [DISTWIDTH] IN BLOOD BY AUTOMATED COUNT: 12.8 % (ref 10–15)
GFR SERPL CREATININE-BSD FRML MDRD: >90 ML/MIN/1.7M2
GLUCOSE SERPL-MCNC: 118 MG/DL (ref 70–99)
GLUCOSE UR STRIP-MCNC: NEGATIVE MG/DL
HCG UR QL: NEGATIVE
HCT VFR BLD AUTO: 42.7 % (ref 35–47)
HGB BLD-MCNC: 15 G/DL (ref 11.7–15.7)
HGB UR QL STRIP: NEGATIVE
IMM GRANULOCYTES # BLD: 0 10E9/L (ref 0–0.4)
IMM GRANULOCYTES NFR BLD: 0.2 %
KETONES UR STRIP-MCNC: 40 MG/DL
LACTATE BLD-SCNC: 1.1 MMOL/L (ref 0.7–2)
LEUKOCYTE ESTERASE UR QL STRIP: NEGATIVE
LYMPHOCYTES # BLD AUTO: 3 10E9/L (ref 0.8–5.3)
LYMPHOCYTES NFR BLD AUTO: 34.7 %
MCH RBC QN AUTO: 32.3 PG (ref 26.5–33)
MCHC RBC AUTO-ENTMCNC: 35.1 G/DL (ref 31.5–36.5)
MCV RBC AUTO: 92 FL (ref 78–100)
MONOCYTES # BLD AUTO: 0.6 10E9/L (ref 0–1.3)
MONOCYTES NFR BLD AUTO: 6.7 %
MUCOUS THREADS #/AREA URNS LPF: PRESENT /LPF
NEUTROPHILS # BLD AUTO: 5 10E9/L (ref 1.6–8.3)
NEUTROPHILS NFR BLD AUTO: 57.9 %
NITRATE UR QL: NEGATIVE
NRBC # BLD AUTO: 0 10*3/UL
NRBC BLD AUTO-RTO: 0 /100
PH UR STRIP: 6 PH (ref 5–7)
PLATELET # BLD AUTO: 178 10E9/L (ref 150–450)
POTASSIUM SERPL-SCNC: 3.7 MMOL/L (ref 3.4–5.3)
PROT SERPL-MCNC: 7.2 G/DL (ref 6.8–8.8)
RBC # BLD AUTO: 4.65 10E12/L (ref 3.8–5.2)
RBC #/AREA URNS AUTO: 0 /HPF (ref 0–2)
SODIUM SERPL-SCNC: 140 MMOL/L (ref 133–144)
SOURCE: ABNORMAL
SP GR UR STRIP: 1.02 (ref 1–1.03)
SPECIMEN SOURCE: NORMAL
SQUAMOUS #/AREA URNS AUTO: <1 /HPF (ref 0–1)
UROBILINOGEN UR STRIP-MCNC: 2 MG/DL (ref 0–2)
WBC # BLD AUTO: 8.7 10E9/L (ref 4–11)
WBC #/AREA URNS AUTO: <1 /HPF (ref 0–5)
WET PREP SPEC: NORMAL

## 2018-07-18 PROCEDURE — 80053 COMPREHEN METABOLIC PANEL: CPT | Performed by: EMERGENCY MEDICINE

## 2018-07-18 PROCEDURE — 85025 COMPLETE CBC W/AUTO DIFF WBC: CPT | Performed by: EMERGENCY MEDICINE

## 2018-07-18 PROCEDURE — 87210 SMEAR WET MOUNT SALINE/INK: CPT | Performed by: EMERGENCY MEDICINE

## 2018-07-18 PROCEDURE — 83605 ASSAY OF LACTIC ACID: CPT | Performed by: EMERGENCY MEDICINE

## 2018-07-18 PROCEDURE — 87591 N.GONORRHOEAE DNA AMP PROB: CPT | Performed by: EMERGENCY MEDICINE

## 2018-07-18 PROCEDURE — 96375 TX/PRO/DX INJ NEW DRUG ADDON: CPT

## 2018-07-18 PROCEDURE — 81001 URINALYSIS AUTO W/SCOPE: CPT | Performed by: EMERGENCY MEDICINE

## 2018-07-18 PROCEDURE — 99285 EMERGENCY DEPT VISIT HI MDM: CPT | Mod: 25

## 2018-07-18 PROCEDURE — 76856 US EXAM PELVIC COMPLETE: CPT

## 2018-07-18 PROCEDURE — 96374 THER/PROPH/DIAG INJ IV PUSH: CPT

## 2018-07-18 PROCEDURE — 81025 URINE PREGNANCY TEST: CPT | Performed by: EMERGENCY MEDICINE

## 2018-07-18 PROCEDURE — 25000128 H RX IP 250 OP 636: Performed by: EMERGENCY MEDICINE

## 2018-07-18 PROCEDURE — 87491 CHLMYD TRACH DNA AMP PROBE: CPT | Performed by: EMERGENCY MEDICINE

## 2018-07-18 PROCEDURE — 96372 THER/PROPH/DIAG INJ SC/IM: CPT

## 2018-07-18 RX ORDER — DOXYCYCLINE 100 MG/1
100 CAPSULE ORAL 2 TIMES DAILY
Qty: 28 CAPSULE | Refills: 0 | Status: SHIPPED | OUTPATIENT
Start: 2018-07-18 | End: 2018-08-01

## 2018-07-18 RX ORDER — CEFTRIAXONE SODIUM 250 MG
250 VIAL (EA) INJECTION ONCE
Status: COMPLETED | OUTPATIENT
Start: 2018-07-18 | End: 2018-07-18

## 2018-07-18 RX ORDER — ONDANSETRON 2 MG/ML
4 INJECTION INTRAMUSCULAR; INTRAVENOUS EVERY 30 MIN PRN
Status: DISCONTINUED | OUTPATIENT
Start: 2018-07-18 | End: 2018-07-19 | Stop reason: HOSPADM

## 2018-07-18 RX ORDER — MORPHINE SULFATE 4 MG/ML
4 INJECTION, SOLUTION INTRAMUSCULAR; INTRAVENOUS
Status: COMPLETED | OUTPATIENT
Start: 2018-07-18 | End: 2018-07-18

## 2018-07-18 RX ADMIN — ONDANSETRON 4 MG: 2 INJECTION INTRAMUSCULAR; INTRAVENOUS at 21:23

## 2018-07-18 RX ADMIN — CEFTRIAXONE SODIUM 250 MG: 250 INJECTION, POWDER, FOR SOLUTION INTRAMUSCULAR; INTRAVENOUS at 23:39

## 2018-07-18 RX ADMIN — MORPHINE SULFATE 4 MG: 4 INJECTION INTRAVENOUS at 21:24

## 2018-07-18 ASSESSMENT — ENCOUNTER SYMPTOMS
BACK PAIN: 1
DIFFICULTY URINATING: 1
VOMITING: 1
NAUSEA: 1

## 2018-07-18 NOTE — ED AVS SNAPSHOT
Emergency Department    [address] [address]    Phone:  [phone]    Fax:  [phone]                                       Ashley Chauhan   MRN: [id number]    Department:   Emergency Department   Date of Visit:  7/18/2018           Patient Information     Date Of Birth          1990        Your diagnoses for this visit were:     Cervicitis        You were seen by Odilon Pablo MD.      Follow-up Information     Follow up with [facility]. Schedule an appointment as soon as possible for a visit in 1 week.    Why:  For repeat evaluation and symptom check    Contact information:    [address] [address] [phone]          Follow up with  Emergency Department.    Specialty:  EMERGENCY MEDICINE    Why:  If symptoms worsen    Contact information:    [address] [facility] [id number] [phone]      Discharge References/Attachments     PELVIC INFLAMMATORY DISEASE (ENGLISH)    PELVIC INFLAMMATORY DISEASE, TREATING WITH MEDICINES (PID) (ENGLISH)      24 Hour Appointment Hotline       To make an appointment at any [facility], call [phone] ([phone]). If you don't have a family doctor or clinic, we will help you find one. [facility] clinics are conveniently located to serve the needs of you and your family.             Review of your medicines      START taking        Dose / Directions Last dose taken    doxycycline 100 MG capsule   Commonly known as:  VIBRAMYCIN   Dose:  100 mg   Quantity:  28 capsule        Take 1 capsule (100 mg) by mouth 2 times daily for 14 days   Refills:  0          Our records show that you are taking the medicines listed below. If these are incorrect, please call your family doctor or clinic.        Dose / Directions Last dose taken    ALBUTEROL IN        Refills:  0        FLEXERIL PO        Refills:  0        FLINSTONES GUMMIES OMEGA-3 DHA Chew         Refills:  0        guaiFENesin-codeine 100-10 MG/5ML Soln solution   Commonly known as:  ROBITUSSIN AC   Dose:  1-2 tsp.   Quantity:  240 mL        Take 5-10 mLs by mouth every 4 hours as needed for cough   Refills:  0        HYDROcodone-acetaminophen 5-325 MG per tablet   Commonly known as:  NORCO   Dose:  1-2 tablet   Quantity:  15 tablet        Take 1-2 tablets by mouth every 4 hours as needed for moderate to severe pain   Refills:  0        lidocaine 5 % Patch   Commonly known as:  LIDODERM   Quantity:  10 patch        Apply 1 patch to painful area for up to 12 h within a 24 h period.  Remove after 12 hours.   Refills:  0        ondansetron 4 MG ODT tab   Commonly known as:  ZOFRAN ODT   Dose:  4 mg   Quantity:  10 tablet        Take 1 tablet (4 mg) by mouth every 6 hours as needed for nausea   Refills:  0        TRAMADOL HCL PO   Dose:  50 mg        Take 50 mg by mouth   Refills:  0        UNABLE TO FIND        MEDICATION NAME: Oral contraceptive-unknown name   Refills:  0        valACYclovir 1000 mg tablet   Commonly known as:  VALTREX   Dose:  1000 mg   Quantity:  21 tablet        Take 1 tablet (1,000 mg) by mouth 3 times daily for 7 days   Refills:  0                Prescriptions were sent or printed at these locations (1 Prescription)                   Other Prescriptions                Printed at Department/Unit printer (1 of 1)         doxycycline (VIBRAMYCIN) 100 MG capsule                Procedures and tests performed during your visit     CBC with platelets differential    Chlamydia trachomatis PCR    Comprehensive metabolic panel    HCG qualitative urine    Lactic acid whole blood    Neisseria gonorrhoea PCR    Prep for procedure - pelvic exam    UA with Microscopic    US Pelvic Complete w Transvaginal & Abd/Pel Duplex Limited    Wet prep      Orders Needing Specimen Collection     None      Pending Results     Date and Time Order Name Status Description    7/18/2018 2015 Neisseria gonorrhoea  PCR In process     7/18/2018 2015 Chlamydia trachomatis PCR In process             Pending Culture Results     Date and Time Order Name Status Description    7/18/2018 2015 Neisseria gonorrhoea PCR In process     7/18/2018 2015 Chlamydia trachomatis PCR In process             Pending Results Instructions     If you had any lab results that were not finalized at the time of your Discharge, you can call the ED Lab Result RN at [phone]. You will be contacted by this team for any positive Lab results or changes in treatment. The nurses are available 7 days a week from 10A to 6:30P.  You can leave a message 24 hours per day and they will return your call.        Test Results From Your Hospital Stay        7/18/2018  9:40 PM      Component Results     Component Value Ref Range & Units Status    HCG Qual Urine Negative NEG^Negative Final    This test is for screening purposes.  Results should be interpreted along with   the clinical picture.  Confirmation testing is available if warranted by   ordering [id number], HCG Quantitative Pregnancy.           7/18/2018  9:44 PM      Component Results     Component Value Ref Range & Units Status    Color Urine Yellow  Final    Appearance Urine Clear  Final    Glucose Urine Negative NEG^Negative mg/dL Final    Bilirubin Urine Negative NEG^Negative Final    Ketones Urine 40 (A) NEG^Negative mg/dL Final    Specific Gravity Urine 1.017 1.003 - 1.035 Final    Blood Urine Negative NEG^Negative Final    pH Urine 6.0 5.0 - 7.0 pH Final    Protein Albumin Urine Negative NEG^Negative mg/dL Final    Urobilinogen mg/dL 2.0 0.0 - 2.0 mg/dL Final    Nitrite Urine Negative NEG^Negative Final    Leukocyte Esterase Urine Negative NEG^Negative Final    Source Midstream Urine  Final    WBC Urine <1 0 - 5 /HPF Final    RBC Urine 0 0 - 2 /HPF Final    Squamous Epithelial /HPF Urine <1 0 - 1 /HPF Final    Mucous Urine Present (A) NEG^Negative /LPF Final         7/18/2018  8:43 PM      Component Results      Component Value Ref Range & Units Status    WBC 8.7 4.0 - 11.0 10e9/L Final    RBC Count 4.65 3.8 - 5.2 10e12/L Final    Hemoglobin 15.0 11.7 - 15.7 g/dL Final    Hematocrit 42.7 35.0 - 47.0 % Final    MCV 92 78 - 100 fl Final    MCH 32.3 26.5 - 33.0 pg Final    MCHC 35.1 31.5 - 36.5 g/dL Final    RDW 12.8 10.0 - 15.0 % Final    Platelet Count 178 150 - 450 10e9/L Final    Diff Method Automated Method  Final    % Neutrophils 57.9 % Final    % Lymphocytes 34.7 % Final    % Monocytes 6.7 % Final    % Eosinophils 0.3 % Final    % Basophils 0.2 % Final    % Immature Granulocytes 0.2 % Final    Nucleated RBCs 0 0 /100 Final    Absolute Neutrophil 5.0 1.6 - 8.3 10e9/L Final    Absolute Lymphocytes 3.0 0.8 - 5.3 10e9/L Final    Absolute Monocytes 0.6 0.0 - 1.3 10e9/L Final    Absolute Eosinophils 0.0 0.0 - 0.7 10e9/L Final    Absolute Basophils 0.0 0.0 - 0.2 10e9/L Final    Abs Immature Granulocytes 0.0 0 - 0.4 10e9/L Final    Absolute Nucleated RBC 0.0  Final         7/18/2018  9:15 PM      Component Results     Component Value Ref Range & Units Status    Sodium 140 133 - 144 mmol/L Final    Potassium 3.7 3.4 - 5.3 mmol/L Final    Chloride 109 94 - 109 mmol/L Final    Carbon Dioxide 24 20 - 32 mmol/L Final    Anion Gap 7 3 - 14 mmol/L Final    Glucose 118 (H) 70 - 99 mg/dL Final    Urea Nitrogen 10 7 - 30 mg/dL Final    Creatinine 0.69 0.52 - 1.04 mg/dL Final    GFR Estimate >90 >60 mL/min/1.7m2 Final    Non  GFR Calc    GFR Estimate If Black >90 >60 mL/min/1.7m2 Final    African American GFR Calc    Calcium 9.0 8.5 - 10.1 mg/dL Final    Bilirubin Total 0.5 0.2 - 1.3 mg/dL Final    Albumin 4.0 3.4 - 5.0 g/dL Final    Protein Total 7.2 6.8 - 8.8 g/dL Final    Alkaline Phosphatase 69 40 - 150 U/L Final    ALT 16 0 - 50 U/L Final    AST 12 0 - 45 U/L Final         7/18/2018  8:52 PM      Component Results     Component Value Ref Range & Units Status    Lactic Acid 1.1 0.7 - 2.0 mmol/L Final          7/18/2018  9:07 PM      Component Results     Component    Specimen Description    Vagina    Wet Prep    No Trichomonas seen    Wet Prep    No yeast seen    Wet Prep    No clue cells seen    Wet Prep    Few  PMNs seen           7/18/2018  8:57 PM         7/18/2018  8:57 PM               7/18/2018 11:04 PM      Narrative     ULTRASOUND PELVIS WITH TRANSVAGINAL IMAGING AND DOPPLER   7/18/2018  10:39 PM     HISTORY: Left lower quadrant pain. Sepsis. Suspect pelvic inflammatory  disease.    COMPARISON: None.    TECHNIQUE: Endovaginal imaging was also performed to better evaluate  the ovaries. Spectral waveform and color Doppler evaluation were  performed of the ovaries.    FINDINGS: The uterus is anteflexed, measuring 8.0 x 3.8 x 5.2 cm in  long axis, short axis and transverse dimensions respectively. No  myometrial masses. An intrauterine device is present in a usual  location in the endometrial cavity. The right and left ovaries measure  2.7 x 1.8 x 1.6 cm and 7.8 x 6.8 x 5.0 cm respectively. The right  ovary is unremarkable. 6.6 x 5.9 x 4.7 cm simple cyst is present in  the left ovary. Blood flow is visualized in both ovaries. No adnexal  masses. A trace amount of simple-appearing free fluid in the pelvis.        Impression     IMPRESSION:   1. 6 cm simple cyst of the left ovary. This is nonspecific, but most  likely a functional cyst. A follow-up ultrasound could be considered  in 2-3 months for reevaluation.  2. Otherwise, unremarkable appearance of the ovaries. Blood flow is  visualized in both ovaries.  3. An intrauterine device is present in a usual location in the  endometrial cavity.  4. A trace amount of nonspecific free fluid in the pelvis.    JOEY BARRON MD                Clinical Quality Measure: Blood Pressure Screening     Your blood pressure was checked while you were in the emergency department today. The last reading we obtained was  BP: 113/75 . Please read the guidelines below about what these  "numbers mean and what you should do about them.  If your systolic blood pressure (the top number) is less than 120 and your diastolic blood pressure (the bottom number) is less than 80, then your blood pressure is normal. There is nothing more that you need to do about it.  If your systolic blood pressure (the top number) is 120-139 or your diastolic blood pressure (the bottom number) is 80-89, your blood pressure may be higher than it should be. You should have your blood pressure rechecked within a year by a primary care provider.  If your systolic blood pressure (the top number) is 140 or greater or your diastolic blood pressure (the bottom number) is 90 or greater, you may have high blood pressure. High blood pressure is treatable, but if left untreated over time it can put you at risk for heart attack, stroke, or kidney failure. You should have your blood pressure rechecked by a primary care provider within the next 4 weeks.  If your provider in the emergency department today gave you specific instructions to follow-up with your doctor or provider even sooner than that, you should follow that instruction and not wait for up to 4 weeks for your follow-up visit.        Thank you for choosing [facility]       Thank you for choosing [facility] for your care. Our goal is always to provide you with excellent care. Hearing back from our patients is one way we can continue to improve our services. Please take a few minutes to complete the written survey that you may receive in the mail after you visit with us. Thank you!        [email or web address] Information     [facility] lets you send messages to your doctor, view your test results, renew your prescriptions, schedule appointments and more. To sign up, go to [email or web address] . Click on \"Log in\" on the left side of the screen, which will take you to the Welcome page. Then click on \"Sign up Now\" on the right side of the page.     You will be asked to enter the access code listed " below, as well as some personal information. Please follow the directions to create your username and password.     Your access code is: [email or web address]  Expires: 10/16/2018 11:22 PM     Your access code will  in 90 days. If you need help or a new code, please call your [place] clinic or [phone].        Care EveryWhere ID     This is your Care EveryWhere ID. This could be used by other organizations to access your [place] medical records  [id number]        Equal Access to Services     KALEIGH TAVAREZ : Yue aparicio Sonika, walele luqadaha, qacharlie kaalmaadilene rothman, arnie huitron . So [facility] [phone].    ATENCIÓN: Si habla español, tiene a flowers disposición servicios gratuitos de asistencia lingüística. Llame al [phone].    We comply with applicable federal civil rights laws and [place] laws. We do not discriminate on the basis of race, color, national origin, age, disability, sex, sexual orientation, or gender identity.            After Visit Summary       This is your record. Keep this with you and show to your community pharmacist(s) and doctor(s) at your next visit.

## 2018-07-18 NOTE — ED AVS SNAPSHOT
Emergency Department    [address] [facility] [address]    Phone:  [phone]    Fax:  [phone]                                       Ashley Chauhan   MRN: [id number]    Department:   Emergency Department   Date of Visit:  7/18/2018           After Visit Summary Signature Page     I have received my discharge instructions, and my questions have been answered. I have discussed any challenges I see with this plan with the nurse or doctor.    ..........................................................................................................................................  Patient/Patient Representative Signature      ..........................................................................................................................................  Patient Representative Print Name and Relationship to Patient    ..................................................               ................................................  Date                                            Time    ..........................................................................................................................................  Reviewed by Signature/Title    ...................................................              ..............................................  Date                                                            Time

## 2018-07-19 LAB
C TRACH DNA SPEC QL NAA+PROBE: NEGATIVE
N GONORRHOEA DNA SPEC QL NAA+PROBE: NEGATIVE
SPECIMEN SOURCE: NORMAL
SPECIMEN SOURCE: NORMAL

## 2018-07-19 NOTE — ED PROVIDER NOTES
History     Chief Complaint:  Back Pain    HPI   Ashley Chauhan is a pleasant 28 year old female who presents to the emergency department  for evaluation of back pain and sharp abdominal pain. The patient reports that she has been feeling sharp pains in her vagina which radiates down her inner thigh, with increased pressure during urination. The patient also reports a history of vaginal discharge in the form of black clots since she had IUD placement last year. The patient also complains of feeling warm, nausea and vomiting. She reports no other symptoms at this time.     Allergies:  Aspirin  Amoxicillin  Lortab  Penicillin     Medications:    Albuterol  Flexeril  Robitussin  Lidocaine  Zofran  Tramadol  Valtrex  Norco    Past Medical History:    Chronic shoulder pain  Asthma    Past Surgical History:    C sections    Family History:    No family history on file.    Social History:  The patient  reports that she has been smoking.  She has a 2.50 pack-year smoking history. She has never used smokeless tobacco. She reports that she drinks alcohol. She reports that she uses illicit drugs, including Marijuana.   Marital Status:   [2]     Review of Systems   HENT: Negative.    Gastrointestinal: Positive for nausea and vomiting.   Genitourinary: Positive for difficulty urinating, vaginal discharge and vaginal pain.   Musculoskeletal: Positive for back pain.   Skin: Negative.    All other systems reviewed and are negative.    Physical Exam   First Vitals:  BP: (!) 170/95  Heart Rate: 121  Temp: 98.5  F (36.9  C)  Resp: 19  Height: 152.4 cm (5')  Weight: 72.6 kg (160 lb)  SpO2: 99 %      Physical Exam  Vitals: reviewed by me  General: Pt seen on [facility], cooperative, and alert to conversation  Eyes: Tracking well, clear conjunctiva BL  ENT: MMM, midline trachea.   Lungs:   No tachypnea, no accessory muscle use. No respiratory distress.   CV: Rate as above, regular rhythm.    Abd: Soft, non  tender, no guarding, no rebound. Non distended   exam was done with EDT chaperone, normal external exam, does have a larger than normal amount of discharge, does have erythema and inflammation around the cervical eyes.  Minimal CMT, no adnexal tenderness.  MSK: no peripheral edema or joint effusion.  No evidence of trauma  Skin: No rash, normal turgor and temperature  Neuro: Clear speech and no facial droop.  Psych: Not RIS, no e/o AH/VH      Emergency Department Course   Imaging:  US Pelvic Complete w Transvaginal & Abd/Pel Duplex Limited   Final Result   IMPRESSION:    1. 6 cm simple cyst of the left ovary. This is nonspecific, but most   likely a functional cyst. A follow-up ultrasound could be considered   in 2-3 months for reevaluation.   2. Otherwise, unremarkable appearance of the ovaries. Blood flow is   visualized in both ovaries.   3. An intrauterine device is present in a usual location in the   endometrial cavity.   4. A trace amount of nonspecific free fluid in the pelvis.      JOEY BARRON MD         Laboratory:  HCG Negative  UA: Urine ketone 40A, Mucous Urine Present otherwise within normal limits  Chlamydia trachomatis PCR: Pending   Neisseria gonorrhoea PCR: Pending  CBC: WNL    CMP: Glucose 118H, otherwise within normal limits     Lactate 1.1    Interventions:  2123: Zofran 4mg  2124: Morphine 4mg  2339: Rocephin  250mg IM    Emergency Department Course:  2016 Nursing notes and vitals reviewed.  I performed an exam of the patient as documented above.     IV inserted. Medicine administered as documented above. Blood drawn. This was sent to the lab for further testing, results above.    The patient was sent for a US pelvic  while in the emergency department, findings above.     I rechecked the patient and discussed the results of her workup thus far.     Findings and plan explained to the Patient. Patient discharged home with instructions regarding supportive care, medications, and reasons to  return. The importance of close follow-up was reviewed.  I personally reviewed the laboratory results with the Patient and answered all related questions prior to discharge.     Impression & Plan    Medical Decision Making:  Ashley Chauhan is a 28 year old female who presents to the ED with pelvic and back pain, main issue appears to be pelvic pain. She does have evidence of cervicitis and possible PID on my exam, cervix is grossly inflamed with mild CMT but reassuringly, there is no evidence of systemic infection. She is afebrile. Normal White count and lactate. Ultrasound shows a cyst but no TOA, consistent with my exam. Patient will be given Rocephin and discharged with a 10 day prescription of Doxycycline as well as instructions to follow up with PCP and avoid all sexual contact until she is cleared. Patient states that she is okay with this plan, will discharge with very clear return to ED precautions.    Critical Care time:  none    Diagnosis:    ICD-10-CM    1. Cervicitis N72        Disposition:  discharged to home    Discharge Medications:  Discharge Medication List as of 7/18/2018 11:22 PM      START taking these medications    Details   doxycycline (VIBRAMYCIN) 100 MG capsule Take 1 capsule (100 mg) by mouth 2 times daily for 14 days, Disp-28 capsule, R-0, Local Print           IMayur, liliane serving as a scribe at 8:16 PM on 7/18/2018 to document services personally performed by Odilon Pablo* based on my observations and the provider's statements to me.     EMERGENCY DEPARTMENT       Odilon Pablo MD  07/18/18 [id number]

## 2019-03-25 ENCOUNTER — APPOINTMENT (OUTPATIENT)
Dept: ULTRASOUND IMAGING | Facility: CLINIC | Age: 29
End: 2019-03-25
Attending: EMERGENCY MEDICINE
Payer: COMMERCIAL

## 2019-03-25 ENCOUNTER — HOSPITAL ENCOUNTER (EMERGENCY)
Facility: CLINIC | Age: 29
Discharge: HOME | End: 2019-03-25
Attending: EMERGENCY MEDICINE | Admitting: EMERGENCY MEDICINE
Payer: COMMERCIAL

## 2019-03-25 ENCOUNTER — APPOINTMENT (OUTPATIENT)
Dept: CT IMAGING | Facility: CLINIC | Age: 29
End: 2019-03-25
Attending: EMERGENCY MEDICINE
Payer: COMMERCIAL

## 2019-03-25 VITALS
WEIGHT: 175 LBS | RESPIRATION RATE: 18 BRPM | BODY MASS INDEX: 34.36 KG/M2 | OXYGEN SATURATION: 99 % | TEMPERATURE: 98.8 F | DIASTOLIC BLOOD PRESSURE: 66 MMHG | HEIGHT: 60 IN | HEART RATE: 59 BPM | SYSTOLIC BLOOD PRESSURE: 103 MMHG

## 2019-03-25 DIAGNOSIS — N83.201 RIGHT OVARIAN CYST: ICD-10-CM

## 2019-03-25 DIAGNOSIS — R10.32 ABDOMINAL PAIN, LEFT LOWER QUADRANT: ICD-10-CM

## 2019-03-25 LAB
ALBUMIN SERPL-MCNC: 4 G/DL (ref 3.4–5)
ALBUMIN UR-MCNC: NEGATIVE MG/DL
ALP SERPL-CCNC: 80 U/L (ref 40–150)
ALT SERPL W P-5'-P-CCNC: 20 U/L (ref 0–50)
ANION GAP SERPL CALCULATED.3IONS-SCNC: 8 MMOL/L (ref 3–14)
APPEARANCE UR: CLEAR
AST SERPL W P-5'-P-CCNC: 10 U/L (ref 0–45)
BASOPHILS # BLD AUTO: 0 10E9/L (ref 0–0.2)
BASOPHILS NFR BLD AUTO: 0.3 %
BILIRUB SERPL-MCNC: 0.7 MG/DL (ref 0.2–1.3)
BILIRUB UR QL STRIP: NEGATIVE
BUN SERPL-MCNC: 11 MG/DL (ref 7–30)
CALCIUM SERPL-MCNC: 8.7 MG/DL (ref 8.5–10.1)
CHLORIDE SERPL-SCNC: 110 MMOL/L (ref 94–109)
CO2 SERPL-SCNC: 22 MMOL/L (ref 20–32)
COLOR UR AUTO: YELLOW
CREAT SERPL-MCNC: 0.68 MG/DL (ref 0.52–1.04)
DIFFERENTIAL METHOD BLD: NORMAL
EOSINOPHIL # BLD AUTO: 0.1 10E9/L (ref 0–0.7)
EOSINOPHIL NFR BLD AUTO: 1.2 %
ERYTHROCYTE [DISTWIDTH] IN BLOOD BY AUTOMATED COUNT: 12.9 % (ref 10–15)
GFR SERPL CREATININE-BSD FRML MDRD: >90 ML/MIN/{1.73_M2}
GLUCOSE SERPL-MCNC: 78 MG/DL (ref 70–99)
GLUCOSE UR STRIP-MCNC: NEGATIVE MG/DL
HCG UR QL: NEGATIVE
HCT VFR BLD AUTO: 43.4 % (ref 35–47)
HGB BLD-MCNC: 15.2 G/DL (ref 11.7–15.7)
HGB UR QL STRIP: NEGATIVE
IMM GRANULOCYTES # BLD: 0 10E9/L (ref 0–0.4)
IMM GRANULOCYTES NFR BLD: 0.1 %
KETONES UR STRIP-MCNC: NEGATIVE MG/DL
LEUKOCYTE ESTERASE UR QL STRIP: NEGATIVE
LIPASE SERPL-CCNC: 71 U/L (ref 73–393)
LYMPHOCYTES # BLD AUTO: 3 10E9/L (ref 0.8–5.3)
LYMPHOCYTES NFR BLD AUTO: 39.6 %
MCH RBC QN AUTO: 31.9 PG (ref 26.5–33)
MCHC RBC AUTO-ENTMCNC: 35 G/DL (ref 31.5–36.5)
MCV RBC AUTO: 91 FL (ref 78–100)
MONOCYTES # BLD AUTO: 0.5 10E9/L (ref 0–1.3)
MONOCYTES NFR BLD AUTO: 6.3 %
MUCOUS THREADS #/AREA URNS LPF: PRESENT /LPF
NEUTROPHILS # BLD AUTO: 4 10E9/L (ref 1.6–8.3)
NEUTROPHILS NFR BLD AUTO: 52.5 %
NITRATE UR QL: NEGATIVE
NRBC # BLD AUTO: 0 10*3/UL
NRBC BLD AUTO-RTO: 0 /100
PH UR STRIP: 5.5 PH (ref 5–7)
PLATELET # BLD AUTO: 170 10E9/L (ref 150–450)
POTASSIUM SERPL-SCNC: 4 MMOL/L (ref 3.4–5.3)
PROT SERPL-MCNC: 7.5 G/DL (ref 6.8–8.8)
RBC # BLD AUTO: 4.77 10E12/L (ref 3.8–5.2)
RBC #/AREA URNS AUTO: 2 /HPF (ref 0–2)
SODIUM SERPL-SCNC: 140 MMOL/L (ref 133–144)
SOURCE: ABNORMAL
SP GR UR STRIP: 1.02 (ref 1–1.03)
SQUAMOUS #/AREA URNS AUTO: 2 /HPF (ref 0–1)
UROBILINOGEN UR STRIP-MCNC: NORMAL MG/DL (ref 0–2)
WBC # BLD AUTO: 7.6 10E9/L (ref 4–11)
WBC #/AREA URNS AUTO: 1 /HPF (ref 0–5)

## 2019-03-25 PROCEDURE — 80053 COMPREHEN METABOLIC PANEL: CPT | Performed by: EMERGENCY MEDICINE

## 2019-03-25 PROCEDURE — 96374 THER/PROPH/DIAG INJ IV PUSH: CPT | Mod: 59

## 2019-03-25 PROCEDURE — 96375 TX/PRO/DX INJ NEW DRUG ADDON: CPT

## 2019-03-25 PROCEDURE — 81001 URINALYSIS AUTO W/SCOPE: CPT | Performed by: EMERGENCY MEDICINE

## 2019-03-25 PROCEDURE — 25000128 H RX IP 250 OP 636: Performed by: EMERGENCY MEDICINE

## 2019-03-25 PROCEDURE — 25000125 ZZHC RX 250: Performed by: EMERGENCY MEDICINE

## 2019-03-25 PROCEDURE — 74177 CT ABD & PELVIS W/CONTRAST: CPT

## 2019-03-25 PROCEDURE — 96361 HYDRATE IV INFUSION ADD-ON: CPT

## 2019-03-25 PROCEDURE — 93976 VASCULAR STUDY: CPT

## 2019-03-25 PROCEDURE — 85025 COMPLETE CBC W/AUTO DIFF WBC: CPT | Performed by: EMERGENCY MEDICINE

## 2019-03-25 PROCEDURE — 99285 EMERGENCY DEPT VISIT HI MDM: CPT | Mod: 25

## 2019-03-25 PROCEDURE — 83690 ASSAY OF LIPASE: CPT | Performed by: EMERGENCY MEDICINE

## 2019-03-25 PROCEDURE — 81025 URINE PREGNANCY TEST: CPT | Performed by: EMERGENCY MEDICINE

## 2019-03-25 RX ORDER — HYDROMORPHONE HYDROCHLORIDE 1 MG/ML
0.5 INJECTION, SOLUTION INTRAMUSCULAR; INTRAVENOUS; SUBCUTANEOUS
Status: DISCONTINUED | OUTPATIENT
Start: 2019-03-25 | End: 2019-03-25 | Stop reason: HOSPADM

## 2019-03-25 RX ORDER — KETOROLAC TROMETHAMINE 15 MG/ML
15 INJECTION, SOLUTION INTRAMUSCULAR; INTRAVENOUS ONCE
Status: COMPLETED | OUTPATIENT
Start: 2019-03-25 | End: 2019-03-25

## 2019-03-25 RX ORDER — ONDANSETRON 4 MG/1
4 TABLET, ORALLY DISINTEGRATING ORAL EVERY 8 HOURS PRN
Qty: 10 TABLET | Refills: 0 | Status: SHIPPED | OUTPATIENT
Start: 2019-03-25 | End: 2020-08-29

## 2019-03-25 RX ORDER — IOPAMIDOL 755 MG/ML
88 INJECTION, SOLUTION INTRAVASCULAR ONCE
Status: COMPLETED | OUTPATIENT
Start: 2019-03-25 | End: 2019-03-25

## 2019-03-25 RX ADMIN — SODIUM CHLORIDE 1000 ML: 9 INJECTION, SOLUTION INTRAVENOUS at 13:08

## 2019-03-25 RX ADMIN — Medication 0.5 MG: at 14:49

## 2019-03-25 RX ADMIN — SODIUM CHLORIDE 67 ML: 9 INJECTION, SOLUTION INTRAVENOUS at 15:02

## 2019-03-25 RX ADMIN — IOPAMIDOL 88 ML: 755 INJECTION, SOLUTION INTRAVENOUS at 15:02

## 2019-03-25 RX ADMIN — KETOROLAC TROMETHAMINE 15 MG: 15 INJECTION, SOLUTION INTRAMUSCULAR; INTRAVENOUS at 13:08

## 2019-03-25 ASSESSMENT — ENCOUNTER SYMPTOMS
FEVER: 0
SHORTNESS OF BREATH: 1
ABDOMINAL PAIN: 1
FLANK PAIN: 1
COUGH: 0

## 2019-03-25 ASSESSMENT — MIFFLIN-ST. JEOR: SCORE: 1430.29

## 2019-03-25 NOTE — ED AVS SNAPSHOT
Emergency Department  [address] [facility] [address]  Phone:  [phone]  Fax:  [phone]                                    Ashley Chauhan   MRN: [id number]    Department:   Emergency Department   Date of Visit:  3/25/2019           After Visit Summary Signature Page    I have received my discharge instructions, and my questions have been answered. I have discussed any challenges I see with this plan with the nurse or doctor.    ..........................................................................................................................................  Patient/Patient Representative Signature      ..........................................................................................................................................  Patient Representative Print Name and Relationship to Patient    ..................................................               ................................................  Date                                   Time    ..........................................................................................................................................  Reviewed by Signature/Title    ...................................................              ..............................................  Date                                               Time          22EPIC Rev 08/18

## 2019-03-25 NOTE — ED NOTES
Report received. Patient visualized and vitally stable. Patient states that she is still having pain rated at a 6 out of 10 however this is much improved from her initial pain on arrival. Patient denies additional needs at this time. Will continue to monitor.

## 2019-03-25 NOTE — ED PROVIDER NOTES
History     Chief Complaint:  Flank Pain     HPI   Ashley Chauhan is a 29 year old female who presents with left flank pain. The patient had left flank pain that started yesterday evening. The patient rates her pain a 7. The patient notes a history of ovarian cysts in the past and she states this feels similar. The patient also notes she has abdominal cramping that feels like when they placed the IUD. The patient notes vaginal discharge which is somewhat normal but denies vaginal bleeding. The patient notes increased frequency of urination but notes she has been drinking more water. The patient notes she has been taking Tylenol. The patient her mother had a history of kidney stones. The patient notes some shortness of breath with walking. The patient denies chest pain, fever, or cough.     Allergies:  Asprin  Amoxicillin  Lortab  Penicillin   Doxycycline  Hydrocodone-acetaminaphen    Medications:    Albuterol  Flexeril  Zofran  Omeprazole  Valtrex    Past Medical History:    Chronic shoulder pain  Asthma  GERD  Endometriosis  HELLP syndrome    Past Surgical History:    C section x2  Laparoscopic cystectomy  Eye surgery     Family History:    Father: bipolar disorder    Social History:  The patient was accompanied to the ED by .  Smoking Status: Current Every Day Smoker   Smokeless Tobacco: Never Used  Alcohol Use: Positive  Marital Status:   [2]     Review of Systems   Constitutional: Negative for fever.   Respiratory: Positive for shortness of breath. Negative for cough.    Cardiovascular: Negative for chest pain.   Gastrointestinal: Positive for abdominal pain.   Genitourinary: Positive for flank pain.   All other systems reviewed and are negative.    Physical Exam     Patient Vitals for the past 24 hrs:   BP Temp Temp src Pulse Resp SpO2 Height Weight   03/25/19 1530 103/66 -- -- 59 -- 99 % -- --   03/25/19 1525 112/79 -- -- 79 -- 100 % -- --   03/25/19 1440 -- -- -- -- -- 100 % -- --    03/25/19 1430 106/62 -- -- 83 -- 98 % -- --   03/25/19 1400 103/61 -- -- 74 -- 100 % -- --   03/25/19 1330 103/86 -- -- 70 -- 98 % -- --   03/25/19 1300 105/86 -- -- 70 -- 98 % -- --   03/25/19 1230 -- -- -- 74 -- 95 % -- --   03/25/19 1207 96/80 -- -- 63 -- 98 % -- --   03/25/19 1138 124/79 98.8  F (37.1  C) Oral 110 18 100 % 1.524 m (5') 79.4 kg (175 lb)       Physical Exam  General: Alert, appears well-developed and well-nourished. Cooperative.     In mild distress  HEENT:  Head:  Atraumatic  Ears:  External ears are normal  Mouth/Throat:  Oropharynx is without erythema or exudate and mucous membranes are moist.   Eyes:   Conjunctivae normal and EOM are normal. No scleral icterus.  CV:  Normal rate, regular rhythm, normal heart sounds and radial pulses are 2+ and symmetric.  No murmur.  Resp:  Breath sounds are clear bilaterally    Non-labored, no retractions or accessory muscle use  GI:  Abdomen is soft, no distension, LLQ tenderness. No rebound or guarding.  Mild left CVA tenderness  MS:  Normal range of motion. No edema.    Normal strength in all 4 extremities.     Back atraumatic.    No midline cervical, thoracic, or lumbar tenderness  Skin:  Warm and dry.  No rash or lesions noted.  Neuro: Alert. Normal strength. GCS: 15  Psych:  Normal mood and affect.    Emergency Department Course   Imaging:  Radiology findings were communicated with the patient who voiced understanding of the findings.    US Pelvis Cmplt w Transvag & Doppler LmtPel Duplex Limited  Complex right ovarian lesion. I would recommend short-term  ultrasound followup to confirm its presumed physiologic nature. This  could be performed in about 6 weeks if indicated clinically.  GRAHAM ARRIAGA MD  Reading per radiology    CT Abdomen Pelvis   1. No acute inflammatory process identified.   2. Right ovarian/adnexal cysts.  Reading per radiology    Laboratory:  Laboratory findings were communicated with the patient who voiced understanding of the  findings.    CBC: WBC 7.6, HGB 15.2,   CMP: chloride 110(H) o/w WNL (Creatinine 0.68)  Lipase: 71(L)    UA with microscopic: squamous epithelial 2(H), mucous present (A) o/w WNL  HCG qualitative urine: negative    Interventions:  1308 NS 1000 mL IV  1308 Toradol 15 mg IV  1449 Dilaudid 0.5 mg IV    Emergency Department Course:   Nursing notes and vitals reviewed.    1215 I performed an exam of the patient as documented above.    The patient provided a urine sample here in the emergency department. This was sent for laboratory testing, findings above.    1234 The patient was sent for a US Pelvis Cmplt w Transvag & Dopple while in the emergency department, results above.     1300 IV was inserted and blood was drawn for laboratory testing, results above.    1443 I returned to update the patient.     1505 The patient was sent for a CT Abdomen Pelvis while in the emergency department, results above.     1556 I personally reviewed the laboratory and imaging results with the patient and answered all related questions prior to discharge.    Impression & Plan      Medical Decision Making:  Patient is a 29-year-old female with no pertinent past medical history presents with left lower quadrant abdominal pain and left flank pain over the last 24 hours.  Patient rates her pain at about a 6 out of 10.  She initially had some lower suprapubic abdominal pain and concern for potential ovarian cyst vs torsion as the cause of her abdominal pain today.  Ultrasound shows a complex right ovarian lesion although there is no evidence of a left hemorrhagic cyst or ovarian cyst.  Ultimately, patient continues to have pain requiring IV pain medication and a CT scan was obtained out of concern for potential diverticulitis and/or potential bowel obstruction or perforated viscous.  Reassuringly, CT scan is unremarkable and this reconfirms a right ovarian or adnexal cyst.  It is recommended that this right complex ovarian lesion would be  followed up in clinic in about 6 weeks for repeat ultrasound.  There is thought that this is a presumed physiologic nature cyst but would warrant close follow-up with either her primary care provider or OB/GYN.  Blood work is otherwise unremarkable.  Urinalysis shows no evidence of infection.  Patient has normal vital signs here and is afebrile.  She was given strict abdominal pain return precautions and had all questions answered prior to discharge.  She was discharged home with Zofran and close outpatient follow-up with her primary care provider.    Diagnosis:    ICD-10-CM    1. Right ovarian cyst N83.201    2. Abdominal pain, left lower quadrant R10.32         Disposition:   The patient is discharged to home.    Discharge Medications:     CONTINUE these medicines which may have CHANGED, or have new prescriptions. If we are uncertain of the size of tablets/capsules you have at home, strength may be listed as something that might have changed.      Dose / Directions   ondansetron 4 MG ODT tab  Commonly known as:  ZOFRAN ODT  This may have changed:  when to take this      Dose:  4 mg  Take 1 tablet (4 mg) by mouth every 8 hours as needed for nausea  Quantity:  10 tablet  Refills:  0           Where to get your medicines      Some of these will need a paper prescription and others can be bought over the counter. Ask your nurse if you have questions.    Bring a paper prescription for each of these medications    ondansetron 4 MG ODT tab         Scribe Disclosure:  I, Diana Goodenion, am serving as a scribe at 12:08 PM on 3/25/2019 to document services personally performed by Олег Guzmán MD based on my observations and the provider's statements to me.   EMERGENCY DEPARTMENT       Олег Guzmán MD  03/25/19 8321

## 2020-05-15 ENCOUNTER — HOSPITAL ENCOUNTER (EMERGENCY)
Facility: CLINIC | Age: 30
Discharge: HOME | End: 2020-05-15
Attending: EMERGENCY MEDICINE | Admitting: EMERGENCY MEDICINE
Payer: COMMERCIAL

## 2020-05-15 VITALS
RESPIRATION RATE: 16 BRPM | SYSTOLIC BLOOD PRESSURE: 134 MMHG | DIASTOLIC BLOOD PRESSURE: 75 MMHG | TEMPERATURE: 98.1 F | OXYGEN SATURATION: 98 % | HEART RATE: 118 BPM

## 2020-05-15 DIAGNOSIS — S61.217A LACERATION OF LEFT LITTLE FINGER WITHOUT FOREIGN BODY WITHOUT DAMAGE TO NAIL, INITIAL ENCOUNTER: ICD-10-CM

## 2020-05-15 PROCEDURE — 99283 EMERGENCY DEPT VISIT LOW MDM: CPT

## 2020-05-15 PROCEDURE — 12001 RPR S/N/AX/GEN/TRNK 2.5CM/<: CPT

## 2020-05-15 ASSESSMENT — ENCOUNTER SYMPTOMS
RESPIRATORY NEGATIVE: 1
CONSTITUTIONAL NEGATIVE: 1

## 2020-05-15 NOTE — ED PROVIDER NOTES
History     Chief Complaint:  Laceration       HPI   Ashley Chauhan is a 30 year old female who presents to the emergency department for evaluation of laceration.  She states that just prior to arrival, she was using a brand-new knife to cut open a brand-new fishing vernell for her child when she accidentally cut her left small fingertip.  No other injuries.  No concern for bony injury or residual foreign body.  She is not on blood thinners, though and bleeding was controlled prior to arrival.  She is ambidextrous.  No recent cough, fevers, or trouble breathing.      Allergies:  Aspirin  Amoxicillin  Lortab [Hydrocodone-Acetaminophen]  Penicillin G     Medications:    Albuterol  Flexeril  Zofran  Tramadol  Valtrex    Past Medical History:    Shoulder pain  Asthma    Past Surgical History:    Eye surgery  GYN surgery    Family History:    No past pertinent family history.    Social History:  The patient presents today alone.  Smoking Status: Current Every Day Smoker  Smokeless Tobacco: Never Used  Alcohol Use: Yes  Drug Use: No  PCP: Bryan, Veronica [place]    [place] farm    Review of Systems   Constitutional: Negative.    Respiratory: Negative.        Physical Exam     Patient Vitals for the past 24 hrs:   BP Temp Temp src Pulse Resp SpO2   05/15/20 1453 134/75 98.1  F (36.7  C) Temporal 118 16 98 %      Physical Exam  General: Nontoxic appearing woman sitting upright in [employer] for  CV: no active bleeding, normal cap refill in L small finger  Resp: normal respiratory effort  MSK: no surrounding bony tenderness, FROM all joints in L small finger and hand  Skin: 2.0 cm V-shaped flap laceration to L small finger pad, no visible bone or tendon or foreign body  Neuro: alert, surrounding sensation and motor function intact  Psych: cooperative      Emergency Department Course     Procedures:    Procedure Note: Laceration Repair   Performed by: Alfredo Patrick MD    Verbal consent given by patient  who confirms understanding of the procedure being performed after discussing the risks, benefits and alternatives.    Preparation: Patient was prepped and draped in usual sterile fashion, with assistance from ERT.  Irrigation solution: saline    Body area: L 5th finger tip  Laceration length: 2.0 cm  Contamination: The wound is not grossly contaminated.  Foreign bodies: none  Tendon involvement: none  Anesthesia:  Digital block  Local anesthetic: Bupivacaine 0.5% with epinephrine    Debridement: none   Skin closure: Closed with 4 x 5.0 Ethilon   Technique: interrupted  Approximation: close  Approximation difficulty: simple    Patient tolerated the procedure well with no immediate complications.      Emergency Department Course:    1504 Nursing notes and vitals reviewed.    1507 I performed an exam of the patient as documented above.     I performed electronic chart review in EPIC.    1514 I performed the laceration procedure as documented above.     Findings and plan explained to the Patient. Patient discharged home with instructions regarding supportive care, medications, and reasons to return. The importance of close follow-up was reviewed.     Impression & Plan     Medical Decision Making:  Fingertip laceration that was sutured to achieve good wound edge approximation and maintain hemostasis.  Neither the patient nor I have clinical concern for gross contamination, retained foreign body, or bony injury, there is no role for imaging or further testing at this time.  Suture should be removed in 10 to 14 days through clinic.  We discussed return precautions in likely event of wound healing problems such as infection.  She was discharged home in improved condition.    Discharge Diagnosis:    ICD-10-CM    1. Laceration of left little finger without foreign body without damage to nail, initial encounter  S61.217A      Disposition:  The patient is discharged to home.     Scribe Disclosure:  AMAURY, Yared Harding, am serving  as a scribe at 3:05 PM on 5/15/2020 to document services personally performed by Alfredo Patrick MD based on my observations and the provider's statements to me.     This record was created at least in part using electronic voice recognition software, so please excuse any typographical errors.       Alfredo Patrick MD  05/15/20 0165

## 2020-05-15 NOTE — ED AVS SNAPSHOT
Emergency Department  [address] [facility] [address]  Phone:  [phone]  Fax:  [phone]                                    Ashley Chauhan   MRN: [id number]    Department:   Emergency Department   Date of Visit:  5/15/2020           After Visit Summary Signature Page    I have received my discharge instructions, and my questions have been answered. I have discussed any challenges I see with this plan with the nurse or doctor.    ..........................................................................................................................................  Patient/Patient Representative Signature      ..........................................................................................................................................  Patient Representative Print Name and Relationship to Patient    ..................................................               ................................................  Date                                   Time    ..........................................................................................................................................  Reviewed by Signature/Title    ...................................................              ..............................................  Date                                               Time          22EPIC Rev 08/18

## 2020-08-28 VITALS
RESPIRATION RATE: 18 BRPM | OXYGEN SATURATION: 100 % | HEART RATE: 100 BPM | DIASTOLIC BLOOD PRESSURE: 98 MMHG | TEMPERATURE: 96.9 F | SYSTOLIC BLOOD PRESSURE: 149 MMHG

## 2020-08-28 PROCEDURE — 64400 NJX AA&/STRD TRIGEMINAL NRV: CPT | Mod: RT

## 2020-08-28 PROCEDURE — 99283 EMERGENCY DEPT VISIT LOW MDM: CPT | Mod: 25

## 2020-08-29 ENCOUNTER — HOSPITAL ENCOUNTER (EMERGENCY)
Facility: CLINIC | Age: 30
Discharge: HOME | End: 2020-08-29
Attending: EMERGENCY MEDICINE | Admitting: EMERGENCY MEDICINE
Payer: COMMERCIAL

## 2020-08-29 ENCOUNTER — HOSPITAL ENCOUNTER (EMERGENCY)
Facility: CLINIC | Age: 30
Discharge: HOME | End: 2020-08-30
Attending: EMERGENCY MEDICINE | Admitting: EMERGENCY MEDICINE
Payer: COMMERCIAL

## 2020-08-29 ENCOUNTER — APPOINTMENT (OUTPATIENT)
Dept: CT IMAGING | Facility: CLINIC | Age: 30
End: 2020-08-29
Attending: EMERGENCY MEDICINE
Payer: COMMERCIAL

## 2020-08-29 VITALS
BODY MASS INDEX: 33.38 KG/M2 | WEIGHT: 170 LBS | HEIGHT: 60 IN | RESPIRATION RATE: 16 BRPM | HEART RATE: 76 BPM | OXYGEN SATURATION: 99 % | TEMPERATURE: 97.8 F | SYSTOLIC BLOOD PRESSURE: 118 MMHG | DIASTOLIC BLOOD PRESSURE: 106 MMHG

## 2020-08-29 DIAGNOSIS — Z98.890 HISTORY OF RECENT DENTAL PROCEDURE: ICD-10-CM

## 2020-08-29 DIAGNOSIS — K08.89 PAIN, DENTAL: ICD-10-CM

## 2020-08-29 DIAGNOSIS — R68.84 JAW PAIN: ICD-10-CM

## 2020-08-29 LAB
ANION GAP SERPL CALCULATED.3IONS-SCNC: 4 MMOL/L (ref 3–14)
BASOPHILS # BLD AUTO: 0 10E9/L (ref 0–0.2)
BASOPHILS NFR BLD AUTO: 0.3 %
BUN SERPL-MCNC: 9 MG/DL (ref 7–30)
CALCIUM SERPL-MCNC: 8.9 MG/DL (ref 8.5–10.1)
CHLORIDE SERPL-SCNC: 109 MMOL/L (ref 94–109)
CO2 SERPL-SCNC: 27 MMOL/L (ref 20–32)
CREAT BLD-MCNC: 0.3 MG/DL (ref 0.52–1.04)
CREAT SERPL-MCNC: 0.64 MG/DL (ref 0.52–1.04)
DIFFERENTIAL METHOD BLD: NORMAL
EOSINOPHIL # BLD AUTO: 0.1 10E9/L (ref 0–0.7)
EOSINOPHIL NFR BLD AUTO: 0.9 %
ERYTHROCYTE [DISTWIDTH] IN BLOOD BY AUTOMATED COUNT: 13 % (ref 10–15)
GFR SERPL CREATININE-BSD FRML MDRD: >90 ML/MIN/{1.73_M2}
GFR SERPL CREATININE-BSD FRML MDRD: >90 ML/MIN/{1.73_M2}
GLUCOSE SERPL-MCNC: 85 MG/DL (ref 70–99)
HCG SERPL QL: NEGATIVE
HCT VFR BLD AUTO: 42.4 % (ref 35–47)
HGB BLD-MCNC: 15 G/DL (ref 11.7–15.7)
IMM GRANULOCYTES # BLD: 0 10E9/L (ref 0–0.4)
IMM GRANULOCYTES NFR BLD: 0.2 %
LYMPHOCYTES # BLD AUTO: 2.8 10E9/L (ref 0.8–5.3)
LYMPHOCYTES NFR BLD AUTO: 42 %
MCH RBC QN AUTO: 32.7 PG (ref 26.5–33)
MCHC RBC AUTO-ENTMCNC: 35.4 G/DL (ref 31.5–36.5)
MCV RBC AUTO: 92 FL (ref 78–100)
MONOCYTES # BLD AUTO: 0.5 10E9/L (ref 0–1.3)
MONOCYTES NFR BLD AUTO: 7.6 %
NEUTROPHILS # BLD AUTO: 3.3 10E9/L (ref 1.6–8.3)
NEUTROPHILS NFR BLD AUTO: 49 %
NRBC # BLD AUTO: 0 10*3/UL
NRBC BLD AUTO-RTO: 0 /100
PLATELET # BLD AUTO: 193 10E9/L (ref 150–450)
POTASSIUM SERPL-SCNC: 3.4 MMOL/L (ref 3.4–5.3)
RBC # BLD AUTO: 4.59 10E12/L (ref 3.8–5.2)
SODIUM SERPL-SCNC: 140 MMOL/L (ref 133–144)
WBC # BLD AUTO: 6.6 10E9/L (ref 4–11)

## 2020-08-29 PROCEDURE — 25000132 ZZH RX MED GY IP 250 OP 250 PS 637: Performed by: EMERGENCY MEDICINE

## 2020-08-29 PROCEDURE — 96365 THER/PROPH/DIAG IV INF INIT: CPT | Mod: 59

## 2020-08-29 PROCEDURE — 84703 CHORIONIC GONADOTROPIN ASSAY: CPT | Performed by: EMERGENCY MEDICINE

## 2020-08-29 PROCEDURE — 25800030 ZZH RX IP 258 OP 636: Performed by: EMERGENCY MEDICINE

## 2020-08-29 PROCEDURE — 25000128 H RX IP 250 OP 636: Performed by: EMERGENCY MEDICINE

## 2020-08-29 PROCEDURE — 99285 EMERGENCY DEPT VISIT HI MDM: CPT | Mod: 25

## 2020-08-29 PROCEDURE — 85025 COMPLETE CBC W/AUTO DIFF WBC: CPT | Performed by: EMERGENCY MEDICINE

## 2020-08-29 PROCEDURE — 82565 ASSAY OF CREATININE: CPT

## 2020-08-29 PROCEDURE — 25000125 ZZHC RX 250: Performed by: EMERGENCY MEDICINE

## 2020-08-29 PROCEDURE — 80048 BASIC METABOLIC PNL TOTAL CA: CPT | Performed by: EMERGENCY MEDICINE

## 2020-08-29 PROCEDURE — 70487 CT MAXILLOFACIAL W/DYE: CPT

## 2020-08-29 PROCEDURE — 96375 TX/PRO/DX INJ NEW DRUG ADDON: CPT

## 2020-08-29 PROCEDURE — 64400 NJX AA&/STRD TRIGEMINAL NRV: CPT

## 2020-08-29 RX ORDER — OXYCODONE AND ACETAMINOPHEN 5; 325 MG/1; MG/1
2 TABLET ORAL ONCE
Status: COMPLETED | OUTPATIENT
Start: 2020-08-29 | End: 2020-08-29

## 2020-08-29 RX ORDER — CLINDAMYCIN PHOSPHATE 900 MG/50ML
900 INJECTION, SOLUTION INTRAVENOUS ONCE
Status: COMPLETED | OUTPATIENT
Start: 2020-08-29 | End: 2020-08-29

## 2020-08-29 RX ORDER — CLINDAMYCIN HCL 150 MG
450 CAPSULE ORAL 3 TIMES DAILY
Qty: 63 CAPSULE | Refills: 0 | Status: SHIPPED | OUTPATIENT
Start: 2020-08-29 | End: 2020-09-05

## 2020-08-29 RX ORDER — MORPHINE SULFATE 4 MG/ML
6 INJECTION, SOLUTION INTRAMUSCULAR; INTRAVENOUS ONCE
Status: COMPLETED | OUTPATIENT
Start: 2020-08-29 | End: 2020-08-29

## 2020-08-29 RX ORDER — IOPAMIDOL 755 MG/ML
80 INJECTION, SOLUTION INTRAVASCULAR ONCE
Status: COMPLETED | OUTPATIENT
Start: 2020-08-29 | End: 2020-08-29

## 2020-08-29 RX ORDER — HYDROCODONE BITARTRATE AND ACETAMINOPHEN 5; 325 MG/1; MG/1
1 TABLET ORAL EVERY 6 HOURS PRN
Qty: 10 TABLET | Refills: 0 | Status: SHIPPED | OUTPATIENT
Start: 2020-08-29 | End: 2020-09-01

## 2020-08-29 RX ADMIN — OXYCODONE HYDROCHLORIDE AND ACETAMINOPHEN 2 TABLET: 5; 325 TABLET ORAL at 01:11

## 2020-08-29 RX ADMIN — CLINDAMYCIN HYDROCHLORIDE 450 MG: 300 CAPSULE ORAL at 01:11

## 2020-08-29 RX ADMIN — SODIUM CHLORIDE 60 ML: 9 INJECTION, SOLUTION INTRAVENOUS at 22:06

## 2020-08-29 RX ADMIN — CLINDAMYCIN PHOSPHATE 900 MG: 900 INJECTION, SOLUTION INTRAVENOUS at 21:34

## 2020-08-29 RX ADMIN — MORPHINE SULFATE 6 MG: 4 INJECTION, SOLUTION INTRAMUSCULAR; INTRAVENOUS at 21:23

## 2020-08-29 RX ADMIN — SODIUM CHLORIDE 1000 ML: 9 INJECTION, SOLUTION INTRAVENOUS at 21:23

## 2020-08-29 RX ADMIN — IOPAMIDOL 80 ML: 755 INJECTION, SOLUTION INTRAVENOUS at 22:06

## 2020-08-29 ASSESSMENT — ENCOUNTER SYMPTOMS
CHILLS: 1
FACIAL SWELLING: 1

## 2020-08-29 ASSESSMENT — MIFFLIN-ST. JEOR: SCORE: 1402.61

## 2020-08-29 NOTE — ED AVS SNAPSHOT
Emergency Department  [address] [facility] [address]  Phone:  [phone]  Fax:  [phone]                                    Ashley Chauhan   MRN: [id number]    Department:   Emergency Department   Date of Visit:  8/28/2020           After Visit Summary Signature Page    I have received my discharge instructions, and my questions have been answered. I have discussed any challenges I see with this plan with the nurse or doctor.    ..........................................................................................................................................  Patient/Patient Representative Signature      ..........................................................................................................................................  Patient Representative Print Name and Relationship to Patient    ..................................................               ................................................  Date                                   Time    ..........................................................................................................................................  Reviewed by Signature/Title    ...................................................              ..............................................  Date                                               Time          22EPIC Rev 08/18

## 2020-08-29 NOTE — ED AVS SNAPSHOT
Emergency Department  [address] [facility] [address]  Phone:  [phone]  Fax:  [phone]                                    Ashley Chauhan   MRN: [id number]    Department:   Emergency Department   Date of Visit:  8/29/2020           After Visit Summary Signature Page    I have received my discharge instructions, and my questions have been answered. I have discussed any challenges I see with this plan with the nurse or doctor.    ..........................................................................................................................................  Patient/Patient Representative Signature      ..........................................................................................................................................  Patient Representative Print Name and Relationship to Patient    ..................................................               ................................................  Date                                   Time    ..........................................................................................................................................  Reviewed by Signature/Title    ...................................................              ..............................................  Date                                               Time          22EPIC Rev 08/18

## 2020-08-29 NOTE — LETTER
August 30, 2020      To Whom It May Concern:      Ashley Chauhan was seen in our Emergency Department today, 08/30/20.  I expect her condition to improve over the next  2days.  She may return to work when improved.    Sincerely,        Cassidy Chavez RN

## 2020-08-29 NOTE — ED PROVIDER NOTES
History     Chief Complaint:  Dental Pain     HPI  Ashley Chauhan is a 30 year old female who presents for evaluation of dental pain. The patient states that she had a right lower dental extraction several days ago at [facility] in [place]. She states that she has had crescendoing pain in the area that has now become unbearable and radiates to the posterior and superior of her head. She notes that she has been unable to sleep since the surgery as well as feeling febrile.  She does not feel that the dental procedure helped her discomfort, which has been present since before the extraction.  No intraoral bleeding or discharge.  No vomiting.  She is tried ibuprofen and Tylenol with incomplete relief.      Allergies:  Aspirin  Amoxicillin  Lortab [Hydrocodone-Acetaminophen]  Penicillin G      Medications:   Albuterol   Zofran   Valtrex  Tramadol     Medical History:   Asthma     Surgical History    section  Cystectomy   Eye surgery   Dental surgery    Family History:   Family history reviewed. No pertinent family history.     Social History:  Patient was accompanied to the ED by her boyfriend.  Smoking Status: Current Smoker    Packs/Day: 0.50  Smokeless Tobacco: Negative   Alcohol Use: Positive   Drug Use: Positive     Marijuana   Primary Physician: Bryan, Veronica Littlejohn   Boyfriend volunteered to me that he is a recovering heroin addict.      Review of Systems   Constitutional: Negative for activity change.   HENT: Positive for dental problem.    Respiratory: Negative.    Neurological: Negative.        Physical Exam     Patient Vitals for the past 24 hrs:   BP Temp Temp src Pulse Resp SpO2   20 2331 (!) 149/98 96.9  F (36.1  C) Oral 100 18 100 %      Physical Exam  General: Nontoxic appearing woman sitting upright in room 10, boyfriend at bedside  HENT: mucous membranes moist, no difficulty controlling secretions, dental socket of tooth #29 appears healthy, mild tenderness to  percussion of tooth #30 which has visible metallic filling, no surrounding swelling or drainage, tongue normal, posterior oropharynx normal  Resp: normal effort, speaks in full phrases, no stridor, no cough observed  MSK: no bony tenderness to face, FROM mandible, no mastoid tenderness  Skin: appropriately warm and dry, no facial erythema or vesicles, no neck swelling or crepitus  Neuro: alert, clear speech, oriented, no meningismus  Psych: anxious    Emergency Department Course     Procedures - Dental Block:  PROCEDURE: R inferior alveolar nerve block  Performed by Dr. MARILY Patrick    INDICATIONS: Right lower dental pain.     ANESTHESIA: 0.5 % Sensorcaine with epinephrine, 27 gauge needle    TECHNIQUE: The right inferior alveolar nerve was blocked with the above medication after patient informed of possible complications including medication reaction and infection.  The coronoid process was palpated with my index finger to find the coronoid notch. Postioning the injection point about 1 centimeter above the lower molar and just medially to the index finger, anesthetic was injected after verifying no blood on aspiration.The patient tolerated the procedure well and there were no complications evident. The patient had some improvement of pain after the procedure.       Interventions:   0111 Percocet 2 325 mg PO  0111 Cleocin 450 mg PO     Emergency Department Course:    0037 Nursing notes and vitals reviewed.    0045 I performed an exam of the patient as documented above.     0050 I performed the dental block procedure as documented above.     0100 Findings and plan explained to the Patient. Patient discharged home with instructions regarding supportive care, medications, and reasons to return. The importance of close follow-up was reviewed. The patient was prescribed as below.    Impression & Plan     Medical Decision Making:  I considered the possibility of dental abscess, necrotizing infection, dental decay, dry  socket, vascular pathology, and other conditions.  Unfortunately she achieved suboptimal relief with a dental block.  I do not think she requires emergent imaging.  She is tolerating oral intake.  We discussed potential risks and benefits of initiating antibiotics for possible dental infection or developing abscess, though explained that I am not convinced that there is a bacterial condition present.  I do not think that opioid medications are indicated under the circumstances, though I did encourage her to follow-up early this coming week through her dentist.  She was discharged home after discussion of the above.      Diagnosis:     ICD-10-CM    1. Jaw pain  R68.84    2. History of recent dental procedure  Z98.890         Disposition:  Discharged to home.    Discharge Medications:  Discharge Medication List as of 8/29/2020  1:13 AM      START taking these medications    Details   clindamycin (CLEOCIN) 150 MG capsule Take 3 capsules (450 mg) by mouth 3 times daily for 7 days, Disp-63 capsule,R-0, Local Print             This note was completed in part using Dragon voice recognition software. Although reviewed after completion, some word and grammatical errors may occur.     Scribe Disclosure:  I, Delmer Suarez, am serving as a scribe at 12:42 AM on 8/29/2020 to document services personally performed by Alfredo Patrick MD based on my observations and the provider's statements to me.              Alfredo Patrick MD  08/30/20 0211

## 2020-08-30 ASSESSMENT — ENCOUNTER SYMPTOMS
RESPIRATORY NEGATIVE: 1
NEUROLOGICAL NEGATIVE: 1
ACTIVITY CHANGE: 0

## 2020-08-30 NOTE — ED PROVIDER NOTES
"  History     Chief Complaint:  Dental Pain    The history is provided by the patient and a significant other.      Ashley Chauhan is a 30 year old female, current smoker, with the below past medical history who presents her significant other for evaluation of right lower dental pain in the setting of having a dental laura extraction 4 days ago in the [facility] in [place] on the lower right side. Patient states the pain has gradually worsening, prompting an ED visit earlier today where she had a dental block procedure, prescribed Clindamycin and discharged home. Patient reports that pain has been increasingly worse, prompting her presentation.     Here, patient states that the \"the nerve block made things worse\" and she did feel improved with the interventions provided in the ED, but the \"minute the Percocet wore off\" the pain worsened. She states that she has been unable to eat or tolerate oral intake due to pain. She states she has only had one dose of the prescribed Clindamycin. She endorses that her face feels swollen and that she's had chills and an elevated temperature of 99.9F which \"is really high for me because I run at 96F.\" She states she has been trying to follow up with her dentist, but has not received a call back. She notes she was taking Tylenol for the pain, but \"they told me to stop taking so much.\" She denies any drainage, but significant other notes \"she's been salivating a lot.\" She denies chance of pregnancy.    Allergies:  Aspirin  Amoxicillin  Lortab  Penicillin G   Sodium hypochlorite solution  Doxycycline    Medications:    Albuterol inhaler  Clindamycin    Past Medical History:    Asthma   Endometriosis  GERD  Anxiety  Depression  HELLP syndrome    Past Surgical History:      Laparoscopy with ovarian cyst removal - left  Etonogestrel implant system  Tibia fracture surgery  Robotic assisted hysterectomy bilateral salpingectomy  Mass excision - RLQ abdominal " "wall  Eye surgery     Family History:    Father - bipolar disorder  Mother - diabetes, heart disease, hypertension  Sister(s) - ovarian cancer, uterine cancer    Social History:  The patient was accompanied to the ED by significant other.  Smoking Status: Yes - current every day smoker  Smokeless Tobacco: Never  Alcohol Use: Yes  Drug Use: Yes - marijuana   Marital Status:   [2]     Review of Systems   Constitutional: Positive for chills.        \"Temperature of 99.9F\"   HENT: Positive for dental problem and facial swelling.         \"Salivating a lot\"; no drainage from tooth   All other systems reviewed and are negative.    Physical Exam     Patient Vitals for the past 24 hrs:   BP Temp Temp src Pulse Resp SpO2 Height Weight   08/29/20 2005 129/82 97.8  F (36.6  C) Temporal 124 16 97 % 1.524 m (5') 77.1 kg (170 lb)       Physical Exam  General: Laying in bed with ice pack to the right side of face.  HENT: mucous membranes moist. Severe decay involving the right mandibular first molarj; tooth is tender to percussion.  Normal tongue elevation. 2 cm of trismus.   Face: No facial swelling.  No redness.  Tenderness to the right angle of the mandible, no palpable abscess.  No tenderness or swelling to the submandibular area.  CV: regular rate, regular rhythm  Resp: normal effort, clear throughout, no crackles or wheezing  GI: abdomen soft and nontender, no guarding  MSK: no bony tenderness  Skin: appropriately warm and dry  Neuro: alert, clear speech, oriented  Psych: normal mood and affect     Emergency Department Course     Imaging:  Radiology findings were communicated with the patient and family who voiced understanding of the findings.    CT Facial Bones with Contrast:  CT Facial Bones with Contrast   Final Result   IMPRESSION:    1.  Presumed post extraction cavity tooth #29. No evidence of significant inflammatory changes in proximity.   2.  No evidence of a drainable fluid collection.   3.  Borderline/mildly " enlarged lymph nodes upper neck bilaterally, nonspecific.          Laboratory:  Laboratory findings were communicated with the patient and family who voiced understanding of the findings.    CBC: AWNL (WBC 6.6, HGB 15.0, )  BMP: AWNL (Creatinine 0.64)  Creatinine POCT (Collected 2123): Creatinine 0.3 (L), GFR Estimate >90  HCG Qualitative Pregnancy Blood: Negative     Interventions:  Medications   0.9% sodium chloride BOLUS (1,000 mLs Intravenous New Bag 8/29/20 2123)   clindamycin (CLEOCIN) infusion 900 mg (900 mg Intravenous New Bag 8/29/20 2134)   morphine (PF) injection 6 mg (6 mg Intravenous Given 8/29/20 2123)   iopamidol (ISOVUE-370) solution 80 mL (80 mLs Intravenous Given 8/29/20 2206)   Saline Flush (60 mLs Intravenous Given 8/29/20 2206)     Procedure:   R inferior alveolar dental block  Performed by Francesca Blanton MD  Indication: pain control for severe dental pain  Procedure: after verbal informed consent was obtained 3cc of marcaine was instilled at the angle of the mandible.  No complications and well tolerated.  Pain was improved.    Emergency Department Course:  Past medical records, nursing notes, and vitals reviewed.    (2027)   I performed an exam of the patient as documented above. History obtained from patient and significant other.    IV was inserted and blood was drawn for laboratory testing, results above.    The patient was sent for a CT Facial Bones with Contrast while in the emergency department, results above.      (10:57 PM)   I rechecked the patient and discussed the results of her workup thus far.    Findings and plan explained to the Patient and significant other. Patient discharged home with instructions regarding supportive care, medications, and reasons to return. The importance of close follow-up was reviewed.     I personally reviewed the laboratory and imaging results with the Patient and significant other and answered all related questions prior to discharge.      Impression & Plan     Medical Decision Making:  Ashley Chauhan is a 30-year-old female with history of asthma, depression, who presents with increased dental pain.  On exam, the patient is afebrile.  I do not appreciate any swelling either of her face or submandibular area, though she endorses extreme pain at the angle of the mandible.  She has trismus, difficulty tolerating fluids.  Fortunately, work-up in the ED is reassuring, with normal labs, CT without signs of facial cellulitis, dental abscess abscess, or Telly's angina.  Pain has been difficult to control.  Ultimately, she consented to additional dental block as above, which improved her pain.  Upon review of records in [facility], the patient has no previous recent narcotic prescriptions.  She will continue oral clindamycin.  She will take Norco as needed for breakthrough pain, and otherwise will try ibuprofen.  Have instructed her to avoid additional Tylenol-containing products.  She will follow-up with dentist in the next 2 days.  She understands that at this point, she does not need evaluation by oral surgeon, we have limited limited care available for dental issues.  Otherwise, she will return for refractory pain, fever, swelling, or other concerns.    Diagnosis:    ICD-10-CM    1. Pain, dental  K08.89        Disposition:  Discharged to home.    Discharge Medications:  New Prescriptions    HYDROCODONE-ACETAMINOPHEN (NORCO) 5-325 MG TABLET    Take 1 tablet by mouth every 6 hours as needed for severe pain       Scribe Disclosure:  Kirstie REBOLLEDO, am serving as a scribe at 8:15 PM on 8/29/2020 to document services personally performed by Francesca Blanton MD based on my observations and the provider's statements to me.   8/29/2020    EMERGENCY DEPARTMENT       Francesca Blanton MD  08/29/20 0982

## 2020-08-30 NOTE — ED TRIAGE NOTES
Alert and Oriented to person, place, time and situation.    Airway patent.    Respirations are regular and unlabored.  Patient talking in full sentences.  Patient denies cough or shortness of breath.    Pulses are strong and regular with palpation.  Skin is normal color, warm and dry.   Cap refill is less than 3 seconds.  Patient denies chest pain/pressure.        Patient seen here yesterday for dental pain. Returns today with worsening pain.

## 2020-12-06 ENCOUNTER — HOSPITAL ENCOUNTER (EMERGENCY)
Facility: CLINIC | Age: 30
Discharge: HOME | End: 2020-12-06
Attending: EMERGENCY MEDICINE | Admitting: EMERGENCY MEDICINE
Payer: COMMERCIAL

## 2020-12-06 VITALS
HEART RATE: 82 BPM | OXYGEN SATURATION: 100 % | RESPIRATION RATE: 14 BRPM | SYSTOLIC BLOOD PRESSURE: 128 MMHG | TEMPERATURE: 97.8 F | DIASTOLIC BLOOD PRESSURE: 80 MMHG | HEIGHT: 60 IN | WEIGHT: 170 LBS | BODY MASS INDEX: 33.38 KG/M2

## 2020-12-06 DIAGNOSIS — H10.32 ACUTE CONJUNCTIVITIS OF LEFT EYE, UNSPECIFIED ACUTE CONJUNCTIVITIS TYPE: ICD-10-CM

## 2020-12-06 PROCEDURE — 250N000009 HC RX 250: Performed by: EMERGENCY MEDICINE

## 2020-12-06 PROCEDURE — 99283 EMERGENCY DEPT VISIT LOW MDM: CPT

## 2020-12-06 RX ORDER — POLYMYXIN B SULFATE AND TRIMETHOPRIM 1; 10000 MG/ML; [USP'U]/ML
1-2 SOLUTION OPHTHALMIC EVERY 4 HOURS
Qty: 1 BOTTLE | Refills: 0 | Status: SHIPPED | OUTPATIENT
Start: 2020-12-06 | End: 2020-12-13

## 2020-12-06 RX ORDER — PROPARACAINE HYDROCHLORIDE 5 MG/ML
1 SOLUTION/ DROPS OPHTHALMIC ONCE
Status: COMPLETED | OUTPATIENT
Start: 2020-12-06 | End: 2020-12-06

## 2020-12-06 RX ADMIN — FLUORESCEIN SODIUM 600 MCG: 0.6 STRIP OPHTHALMIC at 09:07

## 2020-12-06 RX ADMIN — PROPARACAINE HYDROCHLORIDE 1 DROP: 5 SOLUTION/ DROPS OPHTHALMIC at 09:07

## 2020-12-06 ASSESSMENT — VISUAL ACUITY
OD: 20/25
OS: 20/50

## 2020-12-06 ASSESSMENT — ENCOUNTER SYMPTOMS
EYE REDNESS: 1
EYE PAIN: 1

## 2020-12-06 ASSESSMENT — MIFFLIN-ST. JEOR: SCORE: 1402.61

## 2020-12-06 NOTE — ED AVS SNAPSHOT
[facility] Emergency Dept  [address] [address]  Phone: [phone]  Fax: [phone]                                    Ashley Chauhan   MRN: [id number]    Department: [facility] Emergency Dept   Date of Visit: 12/6/2020           After Visit Summary Signature Page    I have received my discharge instructions, and my questions have been answered. I have discussed any challenges I see with this plan with the nurse or doctor.    ..........................................................................................................................................  Patient/Patient Representative Signature      ..........................................................................................................................................  Patient Representative Print Name and Relationship to Patient    ..................................................               ................................................  Date                                   Time    ..........................................................................................................................................  Reviewed by Signature/Title    ...................................................              ..............................................  Date                                               Time          22EPIC Rev 08/18

## 2020-12-06 NOTE — ED TRIAGE NOTES
"Left eye draining with discomfort since yesterday morning. Today woke up and was \"stuck shut and had to run lots of water over it\"  "

## 2020-12-06 NOTE — LETTER
December 6, 2020      To Whom It May Concern:      Ashley Chauhan was seen in our Emergency Department today, 12/06/20.  I expect her condition to improve over the next 2 days.  She may return to work/school when improved.    Sincerely,        Cami Mckinney MD

## 2020-12-06 NOTE — ED PROVIDER NOTES
History     Chief Complaint:  Eye Pain      HPI   Ashley Chauhan is a 30 year old female who presents with eye pain. Patient has had left eye draining with discomfort since yesterday morning. She says her eye lid feels like it is stuck and is very discomfortable. She denies any scratches to her eye. She does not wear contacts but does wear glasses and feels like her vision is blurry out of that eye. She is otherwise healthy. She denies any known contact with someone who has had pink eye recently.         Allergies:  Aspirin  Amoxicillin  Lortab  Penicillin G   Sodium hypochlorite solution  Doxycycline     Medications:    Albuterol inhaler  Clindamycin     Past Medical History:    Asthma   Endometriosis  GERD  Anxiety  Depression  HELLP syndrome     Past Surgical History:      Laparoscopy with ovarian cyst removal - left  Etonogestrel implant system  Tibia fracture surgery  Robotic assisted hysterectomy bilateral salpingectomy  Mass excision - RLQ abdominal wall  Eye surgery      Family History:    Father - bipolar disorder  Mother - diabetes, heart disease, hypertension  Sister(s) - ovarian cancer, uterine cancer     Social History:  The patient was accompanied to the ED by significant other.  Smoking Status: Yes - current every day smoker  Smokeless Tobacco: Never  Alcohol Use: Yes  Drug Use: Yes - marijuana   Marital Status:   [2]         Review of Systems   Eyes: Positive for pain, redness and visual disturbance.   All other systems reviewed and are negative.        Physical Exam     Patient Vitals for the past 24 hrs:   BP Temp Temp src Pulse Resp SpO2 Height Weight   20 0844 128/80 97.8  F (36.6  C) Temporal 82 14 100 % 1.524 m (5') 77.1 kg (170 lb)       Physical Exam    Physical Exam   Constitutional:  Patient is oriented to person, place, and time. They appear well-developed and well-nourished.     HENT:   Mouth/Throat:   Oropharynx is clear and moist.   Eyes:    Conjunctivae  mildly injected. No lid edema and EOM are normal. Slit lamp exam shows on corneal abrasions. No cell and flare. No foreign bodies. Pupils are equal, round, and reactive to light.   Neck:    Normal range of motion.   Cardiovascular: Normal rate, regular rhythm and normal heart sounds.  Exam reveals no gallop and no friction rub.  No murmur heard.  Pulmonary/Chest:  Effort normal and breath sounds normal. Patient has no wheezes. Patient has no rales.   Abdominal:   Soft. Bowel sounds are normal. Patient exhibits no mass. There is no tenderness. There is no rebound and no guarding.   Musculoskeletal:  Normal range of motion. Patient exhibits no edema.   Neurological:   Patient is alert and oriented to person, place, and time. Patient has normal strength. No cranial nerve deficit or sensory deficit. GCS 15.  Skin:   Skin is warm and dry. No rash noted. No erythema.   Psychiatric:   Patient has a normal mood and affect. Patient's behavior is normal. Judgment and thought content normal.       Emergency Department Course     Interventions:   0907 fluorescein 600 mcg left eye   0907 Alcaine 1 drop left eye     Emergency Department Course:  Nursing notes and vitals reviewed.    0905 I performed an exam of the patient as documented above.     Findings and plan explained to the Patient. Patient discharged home with instructions regarding supportive care, medications, and reasons to return. The importance of close follow-up was reviewed. The patient was prescribed as below.     Impression & Plan     Medical Decision Making:  Ashley Chauhan is a 30 year old female who presents to the ED for right eye drainage and getting stuck shut in the morning. She does not wear contacts but she does use glasses and feels like her vision is off ever since this started. Slit lamp exam showed no evidence of corneal abrasion, laceration, foreign bodies, or inflammation. clinical exam is therefore conjunctivitis. I did not see any evidence  of blepharitis or cellulitis. Extra occular movements are in tact. At this time I will give her antibiotic drops. Discussed with her transmission of pink eye and close follow up.        Diagnosis:    ICD-10-CM    1. Acute conjunctivitis of left eye, unspecified acute conjunctivitis type  H10.32        Disposition:  Discharged to home.    Discharge Medications:  Discharge Medication List as of 12/6/2020  9:48 AM      START taking these medications    Details   trimethoprim-polymyxin b (POLYTRIM) 06099-3.1 UNIT/ML-% ophthalmic solution Place 1-2 drops Into the left eye every 4 hours for 7 days, Disp-1 Bottle, R-0, Local Print             Scribe Disclosure:  I, Mike Vargas, am serving as a scribe at 9:05 AM on 12/6/2020 to document services personally performed by Cami Mckinney MD based on my observations and the provider's statements to me.  12/6/2020   [facility] EMERGENCY DEPT       Cami Mckinney MD  12/06/20 1007

## 2021-01-23 ENCOUNTER — HOSPITAL ENCOUNTER (EMERGENCY)
Facility: CLINIC | Age: 31
Discharge: HOME | End: 2021-01-23
Attending: EMERGENCY MEDICINE | Admitting: EMERGENCY MEDICINE
Payer: COMMERCIAL

## 2021-01-23 ENCOUNTER — APPOINTMENT (OUTPATIENT)
Dept: GENERAL RADIOLOGY | Facility: CLINIC | Age: 31
End: 2021-01-23
Attending: EMERGENCY MEDICINE
Payer: COMMERCIAL

## 2021-01-23 VITALS
SYSTOLIC BLOOD PRESSURE: 120 MMHG | HEART RATE: 95 BPM | TEMPERATURE: 98.8 F | WEIGHT: 170 LBS | HEIGHT: 60 IN | RESPIRATION RATE: 14 BRPM | OXYGEN SATURATION: 98 % | DIASTOLIC BLOOD PRESSURE: 60 MMHG | BODY MASS INDEX: 33.38 KG/M2

## 2021-01-23 DIAGNOSIS — R06.00 DYSPNEA, UNSPECIFIED TYPE: ICD-10-CM

## 2021-01-23 DIAGNOSIS — R07.9 CHEST PAIN, UNSPECIFIED TYPE: ICD-10-CM

## 2021-01-23 DIAGNOSIS — J45.21 MILD INTERMITTENT ASTHMA WITH EXACERBATION: ICD-10-CM

## 2021-01-23 DIAGNOSIS — Z20.822 LAB TEST NEGATIVE FOR COVID-19 VIRUS: ICD-10-CM

## 2021-01-23 LAB
ANION GAP SERPL CALCULATED.3IONS-SCNC: 5 MMOL/L (ref 3–14)
BASOPHILS # BLD AUTO: 0.1 10E9/L (ref 0–0.2)
BASOPHILS NFR BLD AUTO: 0.5 %
BUN SERPL-MCNC: 12 MG/DL (ref 7–30)
CALCIUM SERPL-MCNC: 8.8 MG/DL (ref 8.5–10.1)
CHLORIDE SERPL-SCNC: 110 MMOL/L (ref 94–109)
CO2 SERPL-SCNC: 24 MMOL/L (ref 20–32)
CREAT SERPL-MCNC: 0.68 MG/DL (ref 0.52–1.04)
D DIMER PPP FEU-MCNC: 0.3 UG/ML FEU (ref 0–0.5)
DIFFERENTIAL METHOD BLD: ABNORMAL
EOSINOPHIL # BLD AUTO: 0.2 10E9/L (ref 0–0.7)
EOSINOPHIL NFR BLD AUTO: 2 %
ERYTHROCYTE [DISTWIDTH] IN BLOOD BY AUTOMATED COUNT: 12.5 % (ref 10–15)
FLUAV RNA RESP QL NAA+PROBE: NEGATIVE
FLUBV RNA RESP QL NAA+PROBE: NEGATIVE
GFR SERPL CREATININE-BSD FRML MDRD: >90 ML/MIN/{1.73_M2}
GLUCOSE SERPL-MCNC: 92 MG/DL (ref 70–99)
HCT VFR BLD AUTO: 41.4 % (ref 35–47)
HGB BLD-MCNC: 13.7 G/DL (ref 11.7–15.7)
IMM GRANULOCYTES # BLD: 0 10E9/L (ref 0–0.4)
IMM GRANULOCYTES NFR BLD: 0.3 %
INTERPRETATION ECG - MUSE: NORMAL
LABORATORY COMMENT REPORT: NORMAL
LYMPHOCYTES # BLD AUTO: 3.5 10E9/L (ref 0.8–5.3)
LYMPHOCYTES NFR BLD AUTO: 30 %
MCH RBC QN AUTO: 30.5 PG (ref 26.5–33)
MCHC RBC AUTO-ENTMCNC: 33.1 G/DL (ref 31.5–36.5)
MCV RBC AUTO: 92 FL (ref 78–100)
MONOCYTES # BLD AUTO: 0.7 10E9/L (ref 0–1.3)
MONOCYTES NFR BLD AUTO: 6.3 %
NEUTROPHILS # BLD AUTO: 7.2 10E9/L (ref 1.6–8.3)
NEUTROPHILS NFR BLD AUTO: 60.9 %
NRBC # BLD AUTO: 0 10*3/UL
NRBC BLD AUTO-RTO: 0 /100
PLATELET # BLD AUTO: 207 10E9/L (ref 150–450)
POTASSIUM SERPL-SCNC: 3.6 MMOL/L (ref 3.4–5.3)
RBC # BLD AUTO: 4.49 10E12/L (ref 3.8–5.2)
RSV RNA SPEC QL NAA+PROBE: NORMAL
SARS-COV-2 RNA RESP QL NAA+PROBE: NEGATIVE
SODIUM SERPL-SCNC: 139 MMOL/L (ref 133–144)
SPECIMEN SOURCE: NORMAL
TROPONIN I SERPL-MCNC: <0.015 UG/L (ref 0–0.04)
WBC # BLD AUTO: 11.8 10E9/L (ref 4–11)

## 2021-01-23 PROCEDURE — 93005 ELECTROCARDIOGRAM TRACING: CPT

## 2021-01-23 PROCEDURE — 99285 EMERGENCY DEPT VISIT HI MDM: CPT | Mod: 25

## 2021-01-23 PROCEDURE — 250N000012 HC RX MED GY IP 250 OP 636 PS 637: Performed by: EMERGENCY MEDICINE

## 2021-01-23 PROCEDURE — 71045 X-RAY EXAM CHEST 1 VIEW: CPT

## 2021-01-23 PROCEDURE — 258N000003 HC RX IP 258 OP 636: Performed by: EMERGENCY MEDICINE

## 2021-01-23 PROCEDURE — 85379 FIBRIN DEGRADATION QUANT: CPT | Performed by: EMERGENCY MEDICINE

## 2021-01-23 PROCEDURE — C9803 HOPD COVID-19 SPEC COLLECT: HCPCS

## 2021-01-23 PROCEDURE — 87636 SARSCOV2 & INF A&B AMP PRB: CPT | Performed by: EMERGENCY MEDICINE

## 2021-01-23 PROCEDURE — 96360 HYDRATION IV INFUSION INIT: CPT

## 2021-01-23 PROCEDURE — 84484 ASSAY OF TROPONIN QUANT: CPT | Performed by: EMERGENCY MEDICINE

## 2021-01-23 PROCEDURE — 85025 COMPLETE CBC W/AUTO DIFF WBC: CPT | Performed by: EMERGENCY MEDICINE

## 2021-01-23 PROCEDURE — 80048 BASIC METABOLIC PNL TOTAL CA: CPT | Performed by: EMERGENCY MEDICINE

## 2021-01-23 RX ORDER — ALBUTEROL SULFATE 90 UG/1
2 AEROSOL, METERED RESPIRATORY (INHALATION) EVERY 4 HOURS PRN
Qty: 1 INHALER | Refills: 0 | Status: SHIPPED | OUTPATIENT
Start: 2021-01-23

## 2021-01-23 RX ORDER — PREDNISONE 20 MG/1
TABLET ORAL
Qty: 8 TABLET | Refills: 0 | Status: SHIPPED | OUTPATIENT
Start: 2021-01-23 | End: 2023-03-11

## 2021-01-23 RX ORDER — PREDNISONE 20 MG/1
40 TABLET ORAL ONCE
Status: COMPLETED | OUTPATIENT
Start: 2021-01-23 | End: 2021-01-23

## 2021-01-23 RX ORDER — SODIUM CHLORIDE 9 MG/ML
INJECTION, SOLUTION INTRAVENOUS CONTINUOUS
Status: DISCONTINUED | OUTPATIENT
Start: 2021-01-23 | End: 2021-01-23 | Stop reason: HOSPADM

## 2021-01-23 RX ADMIN — PREDNISONE 40 MG: 20 TABLET ORAL at 20:10

## 2021-01-23 RX ADMIN — SODIUM CHLORIDE 1000 ML: 9 INJECTION, SOLUTION INTRAVENOUS at 18:47

## 2021-01-23 ASSESSMENT — ENCOUNTER SYMPTOMS
HEADACHES: 0
SHORTNESS OF BREATH: 1
COUGH: 1
ABDOMINAL PAIN: 0

## 2021-01-23 ASSESSMENT — MIFFLIN-ST. JEOR: SCORE: 1402.61

## 2021-01-23 NOTE — LETTER
January 23, 2021      To Whom It May Concern:      Ashley MAGANA PUNEET Gissel was seen in our Emergency Department today, 01/23/21.  I expect her condition to improve over the next few days.  She may return to work/school when improved.    Sincerely,        AMADOR Noel

## 2021-01-24 NOTE — ED NOTES
Pt ready for discharge. Stable. Education and work exempt form given to pt. No further follow up questions at this time. Discharged to home.

## 2021-01-24 NOTE — ED TRIAGE NOTES
Patient states cough started yesterday. States is SOB that is worse with exertion. Complains of chest pain and feeling sweaty.  Chest pain worse with movement and coughing.       States yesterday had to perform CPR on her father in law, who had .

## 2021-01-24 NOTE — ED PROVIDER NOTES
History   Chief Complaint:  Shortness of Breath and Cough       HPI   Ashley Chauhan is a 30 year old female with history of asthma who presents with shortness of breath and a cough.  She reports that shortness of breath started yesterday.  She has noticed some chest pain today as well.  The patient's of boyfriend's father went into cardiac arrest yesterday and the patient performed CPR for 10 minutes prior to and when EMS initially arrived.  She attributed her chest pain to having performed chest compressions; however, the patient's chest pain has persisted and she does feel slightly short of breath and has had this cough as well.  She was concerned about COVID-19 disease.  The patient's boyfriend's father had a cough and he had plan to have a Covid test today.  Unfortunately, he passed away.  The patient is otherwise healthy.  She does use an inhaler periodically for asthma and smoking.  She thought this could be contributory towards her symptoms.  She admits to continuing to smoke periodically.  She recently moved and was unable to find her inhaler.  There is a family history of DVT/PEs.  The patient has never had a DVT/PE.  No recent surgery or travel.  She is not on birth control.  No history of diabetes, high blood pressure or high cholesterol.  She denies any abdominal pain, headache or other concern.    Review of Systems   Respiratory: Positive for cough and shortness of breath.    Cardiovascular: Positive for chest pain.   Gastrointestinal: Negative for abdominal pain.   Neurological: Negative for headaches.   All other systems reviewed and are negative.    Allergies:  Aspirin  Amoxicillin  Lortab [Hydrocodone-Acetaminophen]  Penicillin G    Medications:  Albuterol inhaler    Past Medical History:    Asthma  Chronic shoulder pain      Past Surgical History:      Left ovarian cyst removal  Etonogestrel implant system  Tibia fracture surgery  Right great toe surgery  Eye muscle  surgery  Hysterectomy  Mass excision, abdominal wall     Family History:    Bipolar disorder  Diabetes   Heart disease, mother  Ovarian cancer  Uterine cancer    Social History:  The patient presents alone. Tobacco use: positive    Physical Exam     Patient Vitals for the past 24 hrs:   BP Temp Temp src Pulse Resp SpO2 Height Weight   01/23/21 1940 120/60 -- -- 95 14 98 % -- --   01/23/21 1818 (!) 164/87 98.8  F (37.1  C) Oral 127 30 99 % 1.524 m (5') 77.1 kg (170 lb)       Physical Exam  Physical Exam   General:  Sitting on bed with self at bedside.   HENT:  No obvious trauma to head  Right Ear:  External ear normal.   Left Ear:  External ear normal.   Nose:  Nose normal.   Eyes:  Conjunctivae and EOM are normal. Pupils are equal, round, and reactive.   Neck: Normal range of motion. Neck supple. No tracheal deviation present.   CV:  Normal heart sounds. No murmur heard.  Pulm/Chest: Effort normal. Few scattered wheezes. Good air exchange.  Abd: Soft. No distension. There is no tenderness. There is no rigidity, no rebound and no guarding.   M/S: Normal range of motion.   Neuro: Alert. GCS 15.  Skin: Skin is warm and dry. No rash noted. Not diaphoretic.   Psych: Normal mood and affect. Behavior is normal.      Emergency Department Course     ECG:  @ 1835  Indication: shortness of breath   Vent. Rate 118 bpm. MT interval 132 ms. QRS duration 76 ms. QT/QTc 324/454 ms. P-R-T axis 56 58 24.   Sinus tachycardia. Possible left atrial enlargement. Borderline ECG.    Read @ 1836 by Dr. Calvin.      Imaging:  Chest XR Port 1 View:  IMPRESSION: No evidence of acute cardiopulmonary disease is seen.  Reading per radiology.     Laboratory:  CBC: WBC 11.8 (H), HGB 13.7,      BMP: Chloride: 110 (H), o/w WNL (Creatinine: 0.68)     D-dimer: 0.3    1842 Troponin I: <0.015     Symptomatic Influenza A/B & SARS-CoV2 (COVID-19) Virus PCR Multiplex: All negative    Emergency Department Course:    Reviewed:  I reviewed nursing  notes, vitals and past medical history    Assessments:  1826 I obtained history and examined the patient as noted above.   1926 I rechecked the patient and explained findings.   2000 I rechecked the patient and explained findings.     Interventions:  1847 0.9% Sodium Chloride BOLUS 1000 mLs IV     Prednisone 40 mg tablet PO     Disposition:  The patient was discharged to home.       Impression & Plan   Medical Decision Making:  Ashley Chauhan is a very pleasant 30 year old female who presents for evaluation of shortness of breath.  The patient has a history of asthma and continues to smoke.  The patient moved and has not found her albuterol inhaler since the move.  Additionally, the patient performed CPR for approximately 10 minutes on her boyfriend's father yesterday.  She has had some chest discomfort from this.  Unfortunately, the boyfriend's father passed away and he was planning to get a Covid test today.  Because of this I considered COVID-19 disease as well.  A screening EKG and troponin are negative.  I doubt this represents myocarditis/ACS/non-STEMI.  A D-dimer is negative; therefore, I doubt pulmonary embolism.  Fortunately, the Covid and influenza PCR swab returned negative.  The chest xray was reviewed and shows no evidence of pneumothorax, infiltrate to suggest pneumonia, widened mediastinum to suggest aortic dissection, obvious rib fracture or free air under the diaphragm to suggest perforated viscous ulcer.    Signs and symptoms are consistent with asthma exacerbation.  A broad differential was considered including inhaled foreign body, asthma, pneumonia, bronchitis, COPD, pneumothorax, cardiac equivalent, viral induced wheezing, allergic phenomena, etc. The patient has not had multiple recent ED visits for these symptoms, has not had recent steroid burst, no recent asthma related admission, and has no history of intubation for asthma.  No indication for hospitalization at this time including  no hypoxia, no marked increase in respiratory rate, minimal to no retractions.  She was not hypoxic on multiple checks and does not require emergent nebulized albuterol treatment at this time and since we are still in the pandemic we are trying not to perform nebulized treatments.  She was provided 40 of prednisone and I wrote a refill for an albuterol inhaler and sent it to a 24-hour pharmacy.  She agreed to pick it up on her way home and will take 2 puffs every 4 hours as needed.  I prescribed 4 additional days of prednisone as well.  Supportive outpatient management is indicated, medications for discharge noted above.  Close followup with primary care physician.  Return if increased wheezing, progressive shortness of breath, develops fever greater than 102.      The treatment plan was discussed with the patient and they expressed understanding of this plan and consented to the plan.  In addition, the patient will return to the emergency department if their symptoms persist, worsen, if new symptoms arise or if there is any concern as other pathology may be present that is not evident at this time. They also understand the importance of close follow up in the clinic and if unable to do so will return to the emergency department for a reevaluation. All questions were answered.    Covid-19  Ashley Chauhan was evaluated during a global COVID-19 pandemic, which necessitated consideration that the patient might be at risk for infection with the SARS-CoV-2 virus that causes COVID-19.   Applicable protocols for evaluation were followed during the patient's care.   COVID-19 was considered as part of the patient's evaluation. The plan for testing is:  a test was obtained during this visit.    Diagnosis:    ICD-10-CM    1. Mild intermittent asthma with exacerbation  J45.21    2. Dyspnea, unspecified type  R06.00    3. Chest pain, unspecified type  R07.9    4. Lab test negative for COVID-19 virus  Z03.818         Discharge Medications:  New Prescriptions    ALBUTEROL (PROAIR HFA/PROVENTIL HFA/VENTOLIN HFA) 108 (90 BASE) MCG/ACT INHALER    Inhale 2 puffs into the lungs every 4 hours as needed for shortness of breath / dyspnea or wheezing    PREDNISONE (DELTASONE) 20 MG TABLET    Take two tablets (= 40mg) each day for 4 days       Scribe Disclosure:  I, Milena Mosher, am serving as a scribe at 6:22 PM on 1/23/2021 to document services personally performed by Jonathan Calvin DO based on my observations and the provider's statements to me.        Jonathan Calvin DO  01/23/21 2010

## 2023-03-11 ENCOUNTER — APPOINTMENT (OUTPATIENT)
Dept: GENERAL RADIOLOGY | Facility: CLINIC | Age: 33
End: 2023-03-11
Attending: EMERGENCY MEDICINE
Payer: COMMERCIAL

## 2023-03-11 ENCOUNTER — HOSPITAL ENCOUNTER (EMERGENCY)
Facility: CLINIC | Age: 33
Discharge: HOME | End: 2023-03-11
Attending: EMERGENCY MEDICINE | Admitting: EMERGENCY MEDICINE
Payer: COMMERCIAL

## 2023-03-11 VITALS
OXYGEN SATURATION: 99 % | BODY MASS INDEX: 35.34 KG/M2 | HEIGHT: 60 IN | TEMPERATURE: 98 F | WEIGHT: 180 LBS | RESPIRATION RATE: 20 BRPM | HEART RATE: 90 BPM | SYSTOLIC BLOOD PRESSURE: 118 MMHG | DIASTOLIC BLOOD PRESSURE: 75 MMHG

## 2023-03-11 DIAGNOSIS — R19.7 DIARRHEA, UNSPECIFIED TYPE: ICD-10-CM

## 2023-03-11 DIAGNOSIS — J45.21 MILD INTERMITTENT ASTHMA WITH EXACERBATION: ICD-10-CM

## 2023-03-11 LAB
ALBUMIN SERPL BCG-MCNC: 4.3 G/DL (ref 3.5–5.2)
ALBUMIN UR-MCNC: NEGATIVE MG/DL
ALP SERPL-CCNC: 75 U/L (ref 35–104)
ALT SERPL W P-5'-P-CCNC: 21 U/L (ref 10–35)
ANION GAP SERPL CALCULATED.3IONS-SCNC: 12 MMOL/L (ref 7–15)
APPEARANCE UR: CLEAR
AST SERPL W P-5'-P-CCNC: 19 U/L (ref 10–35)
BASOPHILS # BLD AUTO: 0 10E3/UL (ref 0–0.2)
BASOPHILS NFR BLD AUTO: 1 %
BILIRUB SERPL-MCNC: 0.3 MG/DL
BILIRUB UR QL STRIP: NEGATIVE
BUN SERPL-MCNC: 8.3 MG/DL (ref 6–20)
CALCIUM SERPL-MCNC: 8.9 MG/DL (ref 8.6–10)
CHLORIDE SERPL-SCNC: 103 MMOL/L (ref 98–107)
COLOR UR AUTO: ABNORMAL
CREAT SERPL-MCNC: 0.65 MG/DL (ref 0.51–0.95)
DEPRECATED HCO3 PLAS-SCNC: 23 MMOL/L (ref 22–29)
EOSINOPHIL # BLD AUTO: 0 10E3/UL (ref 0–0.7)
EOSINOPHIL NFR BLD AUTO: 1 %
ERYTHROCYTE [DISTWIDTH] IN BLOOD BY AUTOMATED COUNT: 12.6 % (ref 10–15)
FLUAV RNA SPEC QL NAA+PROBE: NEGATIVE
FLUBV RNA RESP QL NAA+PROBE: NEGATIVE
GFR SERPL CREATININE-BSD FRML MDRD: >90 ML/MIN/1.73M2
GLUCOSE SERPL-MCNC: 91 MG/DL (ref 70–99)
GLUCOSE UR STRIP-MCNC: NEGATIVE MG/DL
HCT VFR BLD AUTO: 41.3 % (ref 35–47)
HGB BLD-MCNC: 14.2 G/DL (ref 11.7–15.7)
HGB UR QL STRIP: NEGATIVE
IMM GRANULOCYTES # BLD: 0 10E3/UL
IMM GRANULOCYTES NFR BLD: 0 %
KETONES UR STRIP-MCNC: NEGATIVE MG/DL
LEUKOCYTE ESTERASE UR QL STRIP: NEGATIVE
LIPASE SERPL-CCNC: 21 U/L (ref 13–60)
LYMPHOCYTES # BLD AUTO: 3.2 10E3/UL (ref 0.8–5.3)
LYMPHOCYTES NFR BLD AUTO: 45 %
MCH RBC QN AUTO: 31.5 PG (ref 26.5–33)
MCHC RBC AUTO-ENTMCNC: 34.4 G/DL (ref 31.5–36.5)
MCV RBC AUTO: 92 FL (ref 78–100)
MONOCYTES # BLD AUTO: 0.4 10E3/UL (ref 0–1.3)
MONOCYTES NFR BLD AUTO: 6 %
MUCOUS THREADS #/AREA URNS LPF: PRESENT /LPF
NEUTROPHILS # BLD AUTO: 3.5 10E3/UL (ref 1.6–8.3)
NEUTROPHILS NFR BLD AUTO: 47 %
NITRATE UR QL: NEGATIVE
NRBC # BLD AUTO: 0 10E3/UL
NRBC BLD AUTO-RTO: 0 /100
PH UR STRIP: 6 [PH] (ref 5–7)
PLATELET # BLD AUTO: 189 10E3/UL (ref 150–450)
POTASSIUM SERPL-SCNC: 3.8 MMOL/L (ref 3.4–5.3)
PROT SERPL-MCNC: 6.7 G/DL (ref 6.4–8.3)
RBC # BLD AUTO: 4.51 10E6/UL (ref 3.8–5.2)
RBC URINE: <1 /HPF
RSV RNA SPEC NAA+PROBE: NEGATIVE
SARS-COV-2 RNA RESP QL NAA+PROBE: NEGATIVE
SODIUM SERPL-SCNC: 138 MMOL/L (ref 136–145)
SP GR UR STRIP: 1.01 (ref 1–1.03)
SQUAMOUS EPITHELIAL: 1 /HPF
UROBILINOGEN UR STRIP-MCNC: NORMAL MG/DL
WBC # BLD AUTO: 7.2 10E3/UL (ref 4–11)
WBC URINE: 1 /HPF

## 2023-03-11 PROCEDURE — 99284 EMERGENCY DEPT VISIT MOD MDM: CPT | Mod: 25,CS

## 2023-03-11 PROCEDURE — 96375 TX/PRO/DX INJ NEW DRUG ADDON: CPT

## 2023-03-11 PROCEDURE — 85025 COMPLETE CBC W/AUTO DIFF WBC: CPT | Performed by: EMERGENCY MEDICINE

## 2023-03-11 PROCEDURE — 81001 URINALYSIS AUTO W/SCOPE: CPT | Performed by: EMERGENCY MEDICINE

## 2023-03-11 PROCEDURE — 83690 ASSAY OF LIPASE: CPT | Performed by: EMERGENCY MEDICINE

## 2023-03-11 PROCEDURE — 36415 COLL VENOUS BLD VENIPUNCTURE: CPT | Performed by: EMERGENCY MEDICINE

## 2023-03-11 PROCEDURE — C9803 HOPD COVID-19 SPEC COLLECT: HCPCS

## 2023-03-11 PROCEDURE — 94640 AIRWAY INHALATION TREATMENT: CPT

## 2023-03-11 PROCEDURE — 80053 COMPREHEN METABOLIC PANEL: CPT | Performed by: EMERGENCY MEDICINE

## 2023-03-11 PROCEDURE — 258N000003 HC RX IP 258 OP 636: Performed by: EMERGENCY MEDICINE

## 2023-03-11 PROCEDURE — 71046 X-RAY EXAM CHEST 2 VIEWS: CPT

## 2023-03-11 PROCEDURE — 250N000011 HC RX IP 250 OP 636: Performed by: EMERGENCY MEDICINE

## 2023-03-11 PROCEDURE — 96361 HYDRATE IV INFUSION ADD-ON: CPT

## 2023-03-11 PROCEDURE — 87637 SARSCOV2&INF A&B&RSV AMP PRB: CPT | Performed by: EMERGENCY MEDICINE

## 2023-03-11 PROCEDURE — 250N000009 HC RX 250: Performed by: EMERGENCY MEDICINE

## 2023-03-11 PROCEDURE — 96374 THER/PROPH/DIAG INJ IV PUSH: CPT

## 2023-03-11 RX ORDER — ONDANSETRON 2 MG/ML
4 INJECTION INTRAMUSCULAR; INTRAVENOUS EVERY 30 MIN PRN
Status: DISCONTINUED | OUTPATIENT
Start: 2023-03-11 | End: 2023-03-11 | Stop reason: HOSPADM

## 2023-03-11 RX ORDER — ONDANSETRON 4 MG/1
4 TABLET, ORALLY DISINTEGRATING ORAL EVERY 4 HOURS PRN
Qty: 10 TABLET | Refills: 0 | Status: SHIPPED | OUTPATIENT
Start: 2023-03-11 | End: 2023-03-14

## 2023-03-11 RX ORDER — METHYLPREDNISOLONE SODIUM SUCCINATE 125 MG/2ML
125 INJECTION, POWDER, LYOPHILIZED, FOR SOLUTION INTRAMUSCULAR; INTRAVENOUS ONCE
Status: COMPLETED | OUTPATIENT
Start: 2023-03-11 | End: 2023-03-11

## 2023-03-11 RX ORDER — PREDNISONE 20 MG/1
TABLET ORAL
Qty: 10 TABLET | Refills: 0 | Status: SHIPPED | OUTPATIENT
Start: 2023-03-11

## 2023-03-11 RX ORDER — IPRATROPIUM BROMIDE AND ALBUTEROL SULFATE 2.5; .5 MG/3ML; MG/3ML
3 SOLUTION RESPIRATORY (INHALATION)
Status: DISCONTINUED | OUTPATIENT
Start: 2023-03-11 | End: 2023-03-11 | Stop reason: HOSPADM

## 2023-03-11 RX ADMIN — SODIUM CHLORIDE 1000 ML: 9 INJECTION, SOLUTION INTRAVENOUS at 17:07

## 2023-03-11 RX ADMIN — METHYLPREDNISOLONE SODIUM SUCCINATE 125 MG: 125 INJECTION, POWDER, FOR SOLUTION INTRAMUSCULAR; INTRAVENOUS at 16:50

## 2023-03-11 RX ADMIN — IPRATROPIUM BROMIDE AND ALBUTEROL SULFATE 3 ML: 2.5; .5 SOLUTION RESPIRATORY (INHALATION) at 17:10

## 2023-03-11 RX ADMIN — IPRATROPIUM BROMIDE AND ALBUTEROL SULFATE 3 ML: 2.5; .5 SOLUTION RESPIRATORY (INHALATION) at 17:58

## 2023-03-11 RX ADMIN — ONDANSETRON 4 MG: 2 INJECTION INTRAMUSCULAR; INTRAVENOUS at 17:08

## 2023-03-11 ASSESSMENT — ENCOUNTER SYMPTOMS
SORE THROAT: 1
DIAPHORESIS: 1
ABDOMINAL PAIN: 1
DYSURIA: 0
CHILLS: 1
FEVER: 1
DIARRHEA: 1
NAUSEA: 1
COUGH: 1
APPETITE CHANGE: 1

## 2023-03-11 ASSESSMENT — ACTIVITIES OF DAILY LIVING (ADL): ADLS_ACUITY_SCORE: 35

## 2023-03-11 NOTE — ED PROVIDER NOTES
History     Chief Complaint:  Cough       The history is provided by the patient.      Ashley Chauhan is a 33 year old female with a history of endometriosis, LGSIL, among others, who presents with cough that began a week ago and was followed by abdominal pain and diarrhea. She also notes experiencing diaphoresis, chills, intermittent fever, sore throat, ear pain, nausea, decreased appetite, and urination and diarrhea when she coughs. She notes that her highest temperature was 100.9F. Denies new medications, dysuria, rashes, or chance of pregnancy. Mentions history of 2 C-sections, hysterectomy, and 2 cystoscopies. Madina is using Albuterol when needed and is on low dose progesterone for hormone management. 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 She mentions that she lives with her daughter and grandmother. The patient notes that she has received her Covid and influenza vaccinations.    Independent Historian:   None - Patient Only    Review of External Notes: None.     ROS:  Review of Systems   Constitutional: Positive for appetite change (decreased), chills, diaphoresis and fever.   HENT: Positive for ear pain and sore throat.    Respiratory: Positive for cough.    Gastrointestinal: Positive for abdominal pain, diarrhea and nausea.   Genitourinary: Negative for dysuria.   Skin: Negative for rash.       Allergies:  Aspirin  Amoxicillin  Lortab [Hydrocodone-Acetaminophen]  Penicillin G   Adhesive  Sodium Hypochlorite Solution  Doxycycline  Ibuprofen  Pineapple  Venom-Honey Bee  Mushroom  Hydrocodone-Acetaminophen  Lortab    Medications:    Albuterol  Prednisone  Micronor  Epipen    Past Medical History:    Chronic shoulder pain   Asthma   Acute viral conjunctivitis of left eye  LGSIL  Endometriosis  Subcutaneous mass  GERD  Anxiety  Depression   HELLP syndrome  Concussion  TMJ    Past Surgical History:    Eye surgery  C-sections  Laparoscopic cystectomy  Tibia fracture surgery, right  Ovarian  cyst removal  Hysterectomy  RLQ abdominal wall excision     Family History:    Father: bipolar disorder  Mother: diabetes, heart disease, hypertension   Sister(s): ovarian cancer, uterine cancer    Social History:  The patient presents to the ED alone.  The patient presents to the ED via car.  PCP: Veronica Adams     Physical Exam     Patient Vitals for the past 24 hrs:   BP Temp Temp src Pulse Resp SpO2 Height Weight   03/11/23 1840 118/75 -- -- 90 -- 99 % -- --   03/11/23 1719 -- -- -- -- -- 100 % -- --   03/11/23 1649 -- -- -- 68 -- 99 % -- --   03/11/23 1634 -- -- -- 94 -- 99 % -- --   03/11/23 1633 -- -- -- -- -- -- 1.524 m (5') 81.6 kg (180 lb)   03/11/23 1615 (!) 143/88 98  F (36.7  C) Temporal 104 20 100 % -- --        Physical Exam  Constitutional: Young white female. Sitting. Cough no stridor.  HENT: No signs of trauma.   Eyes: EOM are normal. Pupils are equal, round, and reactive to light.   Neck: Normal range of motion. No JVD present. No cervical adenopathy.  Cardiovascular: Regular rhythm.  Exam reveals no gallop and no friction rub.    No murmur heard.  Pulmonary/Chest: Bilateral breath sounds normal. No rhonchi or rales. No expiratory wheezes.  Abdominal: Soft. No tenderness. No rebound or guarding.   Musculoskeletal: No edema. No tenderness.   Lymphadenopathy: No lymphadenopathy.   Neurological: Alert and oriented to person, place, and time. Normal strength. Coordination normal.   Skin: Skin is warm and dry. No rash noted. No erythema.     Emergency Department Course     Imaging:  XR Chest 2 Views   Final Result   IMPRESSION:       Heart size is normal. Lungs are clear bilaterally. Mediastinum and visualized bony structures are unremarkable.         Report per radiology    Laboratory:  Labs Ordered and Resulted from Time of ED Arrival to Time of ED Departure   ROUTINE UA WITH MICROSCOPIC REFLEX TO CULTURE - Abnormal       Result Value    Color Urine Straw      Appearance Urine Clear       Glucose Urine Negative      Bilirubin Urine Negative      Ketones Urine Negative      Specific Gravity Urine 1.008      Blood Urine Negative      pH Urine 6.0      Protein Albumin Urine Negative      Urobilinogen Urine Normal      Nitrite Urine Negative      Leukocyte Esterase Urine Negative      Mucus Urine Present (*)     RBC Urine <1      WBC Urine 1      Squamous Epithelials Urine 1     COMPREHENSIVE METABOLIC PANEL - Normal    Sodium 138      Potassium 3.8      Chloride 103      Carbon Dioxide (CO2) 23      Anion Gap 12      Urea Nitrogen 8.3      Creatinine 0.65      Calcium 8.9      Glucose 91      Alkaline Phosphatase 75      AST 19      ALT 21      Protein Total 6.7      Albumin 4.3      Bilirubin Total 0.3      GFR Estimate >90     LIPASE - Normal    Lipase 21     INFLUENZA A/B, RSV, & SARS-COV2 PCR - Normal    Influenza A PCR Negative      Influenza B PCR Negative      RSV PCR Negative      SARS CoV2 PCR Negative     CBC WITH PLATELETS AND DIFFERENTIAL    WBC Count 7.2      RBC Count 4.51      Hemoglobin 14.2      Hematocrit 41.3      MCV 92      MCH 31.5      MCHC 34.4      RDW 12.6      Platelet Count 189      % Neutrophils 47      % Lymphocytes 45      % Monocytes 6      % Eosinophils 1      % Basophils 1      % Immature Granulocytes 0      NRBCs per 100 WBC 0      Absolute Neutrophils 3.5      Absolute Lymphocytes 3.2      Absolute Monocytes 0.4      Absolute Eosinophils 0.0      Absolute Basophils 0.0      Absolute Immature Granulocytes 0.0      Absolute NRBCs 0.0        Emergency Department Course & Assessments:       Interventions:  Medications   ondansetron (ZOFRAN) injection 4 mg (4 mg Intravenous $Given 3/11/23 1708)   ipratropium - albuterol 0.5 mg/2.5 mg/3 mL (DUONEB) neb solution 3 mL (3 mLs Nebulization $Given 3/11/23 1758)   0.9% sodium chloride BOLUS (0 mLs Intravenous Stopped 3/11/23 1835)   methylPREDNISolone sodium succinate (solu-MEDROL) injection 125 mg (125 mg Intravenous $Given  3/11/23 1650)      Assessments:  1621 I obtained history and examined the patient as noted above.    Independent Interpretation (X-rays, CTs, rhythm strip):  I independently viewed X-ray images and agree with the radiologist.     Social Determinants of Health affecting care:   None    Disposition:  The patient was discharged to home.     Impression & Plan      Medical Decision Making:  This is a 33 year old who comes in for a bad cough as well as diarrhea. Symptoms have been going on for almost a week. She initially had a sore throat, and then developed a severe cough and muscle aches. She states that the last 2 days she has been having watery diarrhea. She denies eating or drinking anything new and has not taken anything other than her Albuterol inhaler. On exam, she is sitting upright. She is wheezing. She has a relatively soft abdomen, and I do not hear pneumonia upon listening. Patient had a chest X-ray, which his unremarkable. She had labs, which were also unremarkable. She has received IV fluids, IV steroids, and DuoNebs, and Zofran, and is feeling better. Her O2 state has been running around 100%. We will have her continue her Albuterol inhaler with spacer. I will have her on Prednisone for 5 days and also give her Zofran for nausea. She has been able to drink fluids here and has not had to go to the bathroom. She should recheck with her PMD the next week. If symptoms change or worsen, recheck in the ED.      Diagnosis:    ICD-10-CM    1. Mild intermittent asthma with exacerbation  J45.21       2. Diarrhea, unspecified type  R19.7            Discharge Medications:  Discharge Medication List as of 3/11/2023  6:35 PM      START taking these medications    Details   ondansetron (ZOFRAN ODT) 4 MG ODT tab Take 1 tablet (4 mg) by mouth every 4 hours as needed for nausea, Disp-10 tablet, R-0, Local Print                Scribe Disclosure:  I, Jaya Golden, am serving as a scribe at 7:19 PM on 3/11/2023 to document  services personally performed by Abhi Santos MD, based on my observations and the provider's statements to me.   3/11/2023   Abhi Santos MD Steinman, Randall Ira, MD  03/12/23 7527

## 2023-03-11 NOTE — LETTER
March 11, 2023      To Whom It May Concern:      Ashley Chauhan was seen in our Emergency Department today, 03/11/23.  I expect her condition to improve over the next 1 days.  She may return to work/school when improved.    Sincerely,        Reshma Phillips RN

## 2024-07-01 ENCOUNTER — OFFICE VISIT (OUTPATIENT)
Dept: URGENT CARE | Facility: URGENT CARE | Age: 34
End: 2024-07-01
Payer: COMMERCIAL

## 2024-07-01 ENCOUNTER — HOSPITAL ENCOUNTER (EMERGENCY)
Facility: CLINIC | Age: 34
Discharge: HOME | End: 2024-07-01
Attending: EMERGENCY MEDICINE | Admitting: EMERGENCY MEDICINE
Payer: COMMERCIAL

## 2024-07-01 ENCOUNTER — APPOINTMENT (OUTPATIENT)
Dept: CT IMAGING | Facility: CLINIC | Age: 34
End: 2024-07-01
Attending: EMERGENCY MEDICINE
Payer: COMMERCIAL

## 2024-07-01 ENCOUNTER — APPOINTMENT (OUTPATIENT)
Dept: ULTRASOUND IMAGING | Facility: CLINIC | Age: 34
End: 2024-07-01
Attending: EMERGENCY MEDICINE
Payer: COMMERCIAL

## 2024-07-01 VITALS
RESPIRATION RATE: 18 BRPM | SYSTOLIC BLOOD PRESSURE: 140 MMHG | BODY MASS INDEX: 35.15 KG/M2 | DIASTOLIC BLOOD PRESSURE: 87 MMHG | OXYGEN SATURATION: 97 % | WEIGHT: 180 LBS | TEMPERATURE: 97.6 F | HEART RATE: 74 BPM

## 2024-07-01 VITALS
TEMPERATURE: 98.3 F | SYSTOLIC BLOOD PRESSURE: 120 MMHG | DIASTOLIC BLOOD PRESSURE: 82 MMHG | RESPIRATION RATE: 16 BRPM | OXYGEN SATURATION: 100 % | HEART RATE: 97 BPM

## 2024-07-01 DIAGNOSIS — R30.0 DYSURIA: Primary | ICD-10-CM

## 2024-07-01 DIAGNOSIS — N83.201 RIGHT OVARIAN CYST: ICD-10-CM

## 2024-07-01 DIAGNOSIS — R10.32 ABDOMINAL PAIN, LEFT LOWER QUADRANT: ICD-10-CM

## 2024-07-01 DIAGNOSIS — K59.00 CONSTIPATION, UNSPECIFIED CONSTIPATION TYPE: ICD-10-CM

## 2024-07-01 LAB
ALBUMIN SERPL BCG-MCNC: 4.1 G/DL (ref 3.5–5.2)
ALBUMIN UR-MCNC: NEGATIVE MG/DL
ALP SERPL-CCNC: 76 U/L (ref 40–150)
ALT SERPL W P-5'-P-CCNC: 20 U/L (ref 0–50)
ANION GAP SERPL CALCULATED.3IONS-SCNC: 9 MMOL/L (ref 7–15)
APPEARANCE UR: CLEAR
AST SERPL W P-5'-P-CCNC: 16 U/L (ref 0–45)
BASOPHILS # BLD AUTO: 0 10E3/UL (ref 0–0.2)
BASOPHILS NFR BLD AUTO: 0 %
BILIRUB SERPL-MCNC: 0.2 MG/DL
BILIRUB UR QL STRIP: NEGATIVE
BUN SERPL-MCNC: 9.9 MG/DL (ref 6–20)
CALCIUM SERPL-MCNC: 9.2 MG/DL (ref 8.6–10)
CHLORIDE SERPL-SCNC: 105 MMOL/L (ref 98–107)
COLOR UR AUTO: YELLOW
CREAT SERPL-MCNC: 0.71 MG/DL (ref 0.51–0.95)
DEPRECATED HCO3 PLAS-SCNC: 26 MMOL/L (ref 22–29)
EGFRCR SERPLBLD CKD-EPI 2021: >90 ML/MIN/1.73M2
EOSINOPHIL # BLD AUTO: 0.1 10E3/UL (ref 0–0.7)
EOSINOPHIL NFR BLD AUTO: 1 %
ERYTHROCYTE [DISTWIDTH] IN BLOOD BY AUTOMATED COUNT: 12 % (ref 10–15)
GLUCOSE SERPL-MCNC: 84 MG/DL (ref 70–99)
GLUCOSE UR STRIP-MCNC: NEGATIVE MG/DL
HCG SERPL QL: NEGATIVE
HCT VFR BLD AUTO: 43 % (ref 35–47)
HGB BLD-MCNC: 14.8 G/DL (ref 11.7–15.7)
HGB UR QL STRIP: NEGATIVE
IMM GRANULOCYTES # BLD: 0 10E3/UL
IMM GRANULOCYTES NFR BLD: 0 %
KETONES UR STRIP-MCNC: NEGATIVE MG/DL
LEUKOCYTE ESTERASE UR QL STRIP: NEGATIVE
LYMPHOCYTES # BLD AUTO: 3.2 10E3/UL (ref 0.8–5.3)
LYMPHOCYTES NFR BLD AUTO: 36 %
MCH RBC QN AUTO: 32.5 PG (ref 26.5–33)
MCHC RBC AUTO-ENTMCNC: 34.4 G/DL (ref 31.5–36.5)
MCV RBC AUTO: 94 FL (ref 78–100)
MONOCYTES # BLD AUTO: 0.5 10E3/UL (ref 0–1.3)
MONOCYTES NFR BLD AUTO: 6 %
NEUTROPHILS # BLD AUTO: 5.1 10E3/UL (ref 1.6–8.3)
NEUTROPHILS NFR BLD AUTO: 57 %
NITRATE UR QL: NEGATIVE
NRBC # BLD AUTO: 0 10E3/UL
NRBC BLD AUTO-RTO: 0 /100
PH UR STRIP: 7 [PH] (ref 5–7)
PLATELET # BLD AUTO: 203 10E3/UL (ref 150–450)
POTASSIUM SERPL-SCNC: 4.4 MMOL/L (ref 3.4–5.3)
PROT SERPL-MCNC: 6.7 G/DL (ref 6.4–8.3)
RBC # BLD AUTO: 4.56 10E6/UL (ref 3.8–5.2)
SODIUM SERPL-SCNC: 140 MMOL/L (ref 135–145)
SP GR UR STRIP: 1.02 (ref 1–1.03)
UROBILINOGEN UR STRIP-ACNC: 0.2 E.U./DL
WBC # BLD AUTO: 9 10E3/UL (ref 4–11)

## 2024-07-01 PROCEDURE — 82040 ASSAY OF SERUM ALBUMIN: CPT | Performed by: EMERGENCY MEDICINE

## 2024-07-01 PROCEDURE — 99202 OFFICE O/P NEW SF 15 MIN: CPT | Performed by: NURSE PRACTITIONER

## 2024-07-01 PROCEDURE — 93976 VASCULAR STUDY: CPT | Mod: XU

## 2024-07-01 PROCEDURE — 250N000011 HC RX IP 250 OP 636: Performed by: EMERGENCY MEDICINE

## 2024-07-01 PROCEDURE — 250N000009 HC RX 250: Performed by: EMERGENCY MEDICINE

## 2024-07-01 PROCEDURE — 85004 AUTOMATED DIFF WBC COUNT: CPT | Performed by: EMERGENCY MEDICINE

## 2024-07-01 PROCEDURE — 36415 COLL VENOUS BLD VENIPUNCTURE: CPT | Performed by: EMERGENCY MEDICINE

## 2024-07-01 PROCEDURE — 76830 TRANSVAGINAL US NON-OB: CPT

## 2024-07-01 PROCEDURE — 84703 CHORIONIC GONADOTROPIN ASSAY: CPT | Performed by: EMERGENCY MEDICINE

## 2024-07-01 PROCEDURE — 81003 URINALYSIS AUTO W/O SCOPE: CPT

## 2024-07-01 PROCEDURE — 74177 CT ABD & PELVIS W/CONTRAST: CPT

## 2024-07-01 PROCEDURE — 82374 ASSAY BLOOD CARBON DIOXIDE: CPT | Performed by: EMERGENCY MEDICINE

## 2024-07-01 PROCEDURE — 250N000013 HC RX MED GY IP 250 OP 250 PS 637: Performed by: EMERGENCY MEDICINE

## 2024-07-01 PROCEDURE — 99285 EMERGENCY DEPT VISIT HI MDM: CPT | Mod: 25

## 2024-07-01 RX ORDER — ALPRAZOLAM 0.5 MG
TABLET ORAL
COMMUNITY

## 2024-07-01 RX ORDER — PROGESTERONE 50 MG/ML
INJECTION, SOLUTION INTRAMUSCULAR
COMMUNITY

## 2024-07-01 RX ORDER — POLYETHYLENE GLYCOL 3350 17 G/17G
1 POWDER, FOR SOLUTION ORAL DAILY
Qty: 238 G | Refills: 0 | Status: SHIPPED | OUTPATIENT
Start: 2024-07-01 | End: 2024-07-15

## 2024-07-01 RX ORDER — DOCUSATE SODIUM 100 MG/1
100 CAPSULE, LIQUID FILLED ORAL ONCE
Status: COMPLETED | OUTPATIENT
Start: 2024-07-01 | End: 2024-07-01

## 2024-07-01 RX ORDER — ACETAMINOPHEN 325 MG/1
975 TABLET ORAL ONCE
Status: COMPLETED | OUTPATIENT
Start: 2024-07-01 | End: 2024-07-01

## 2024-07-01 RX ORDER — DOCUSATE SODIUM 100 MG/1
100 CAPSULE, LIQUID FILLED ORAL 2 TIMES DAILY PRN
Qty: 30 CAPSULE | Refills: 0 | Status: SHIPPED | OUTPATIENT
Start: 2024-07-01

## 2024-07-01 RX ORDER — IOPAMIDOL 755 MG/ML
91 INJECTION, SOLUTION INTRAVASCULAR ONCE
Status: COMPLETED | OUTPATIENT
Start: 2024-07-01 | End: 2024-07-01

## 2024-07-01 RX ORDER — METOCLOPRAMIDE 10 MG/1
10 TABLET ORAL
COMMUNITY

## 2024-07-01 RX ORDER — EPINEPHRINE 0.3 MG/.3ML
1 INJECTION SUBCUTANEOUS
COMMUNITY
Start: 2022-09-08

## 2024-07-01 RX ADMIN — DOCUSATE SODIUM 100 MG: 100 CAPSULE, LIQUID FILLED ORAL at 21:44

## 2024-07-01 RX ADMIN — ACETAMINOPHEN 975 MG: 325 TABLET, FILM COATED ORAL at 21:44

## 2024-07-01 RX ADMIN — IOPAMIDOL 91 ML: 755 INJECTION, SOLUTION INTRAVENOUS at 19:12

## 2024-07-01 RX ADMIN — SODIUM CHLORIDE 66 ML: 9 INJECTION, SOLUTION INTRAVENOUS at 19:12

## 2024-07-01 ASSESSMENT — ACTIVITIES OF DAILY LIVING (ADL)
ADLS_ACUITY_SCORE: 35

## 2024-07-01 NOTE — ED PROVIDER NOTES
Emergency Department Note      History of Present Illness     Chief Complaint   Abdominal Pain    HPI   Ashley Chauhan is a 34 year old female with a history of endometriosis, HELLP syndrome, GERD, and LGSIL of cervix, who presents to the ED for lower left abdominal pain. The patient was seen at at Urgent Care today for the pain, and they referred her to the ED. She has not had a bowel movement for 5 days. She has pressure with urination, but no pain. The pain has been managed with Tylenol. She denies any fever. The patient has not had a history of bowel issues. She has had a partial hysterectomy, but still has her ovaries. She denies any abdominal surgeries- they have all been reproductive surgeries.     Independent Historian   None    Review of External Notes   I reviewed the Urgent Care note from today, 24.     Past Medical History     Medical History and Problem List   Asthma  Chronic shoulder pain  Endometriosis   LGSIL of cervix   Subcutaneous mass   HELLP syndrome  TMJ  GERD    Medications   Albuterol  Alprazolam  Fluoxetine   Metoclopramide  Prednisone     Surgical History   Eye surgery   sections  Laparoscopic cystectomy   Ovarian cyst removal   Tibia fracture surgery  Right toe surgery    Physical Exam     Patient Vitals for the past 24 hrs:   BP Temp Temp src Pulse Resp SpO2 Weight   24 2149 (!) 140/87 -- -- 74 18 97 % --   24 1317 138/89 97.6  F (36.4  C) Temporal 85 18 100 % 81.6 kg (180 lb)     Physical Exam  General: Alert, interactive   Head:  Scalp is atraumatic  Eyes:  The pupils are equal, round, and reactive to light    EOM's intact    No scleral icterus  ENT:      Nose:  The external nose is normal  Ears:  External ears are normal  Mouth/Throat: Mucus membranes are moist       Neck:  Normal range of motion.      There is no rigidity.    Trachea is in the midline         CV:  Regular rate and rhythm    No murmur   Resp:  Breath sounds are clear  bilaterally    Non-labored, no retractions or accessory muscle use      GI:  Abdomen is soft, no distension, no tenderness.     Left lower quadrant tenderness.     Pelvic examination deferred  MS:  Normal strength in all 4 extremities  Skin:  Warm and dry, No rash or lesions noted.  Neuro:   Strength 5/5 x4.    Psych: Awake. Alert.  Normal affect.      Appropriate interactions.    Diagnostics     Lab Results   Labs Ordered and Resulted from Time of ED Arrival to Time of ED Departure   COMPREHENSIVE METABOLIC PANEL - Normal       Result Value    Sodium 140      Potassium 4.4      Carbon Dioxide (CO2) 26      Anion Gap 9      Urea Nitrogen 9.9      Creatinine 0.71      GFR Estimate >90      Calcium 9.2      Chloride 105      Glucose 84      Alkaline Phosphatase 76      AST 16      ALT 20      Protein Total 6.7      Albumin 4.1      Bilirubin Total 0.2     HCG QUALITATIVE PREGNANCY - Normal    hCG Serum Qualitative Negative     CBC WITH PLATELETS AND DIFFERENTIAL    WBC Count 9.0      RBC Count 4.56      Hemoglobin 14.8      Hematocrit 43.0      MCV 94      MCH 32.5      MCHC 34.4      RDW 12.0      Platelet Count 203      % Neutrophils 57      % Lymphocytes 36      % Monocytes 6      % Eosinophils 1      % Basophils 0      % Immature Granulocytes 0      NRBCs per 100 WBC 0      Absolute Neutrophils 5.1      Absolute Lymphocytes 3.2      Absolute Monocytes 0.5      Absolute Eosinophils 0.1      Absolute Basophils 0.0      Absolute Immature Granulocytes 0.0      Absolute NRBCs 0.0         Imaging   CT Abdomen Pelvis w Contrast   Final Result   IMPRESSION:    1.  5.8 cm simple cyst right ovary.      2.  Previous hysterectomy.      Reference:      Guidelines for incidental benign or probably benign adnexal cyst on CT/MRI.   Managing incidental findings on abdominal and pelvic CT and MRI, part 1: White paper of the ACR incidental findings committee 2 on adnexal findings.      Premenopausal:      Less than 5 cm: Benign, no  follow-up.      1.  Greater than 5 cm: Ultrasound follow-up in 8 weeks.      US Pelvis Cmplt w Transvag & Doppler LmtPel Duplex Limited   Final Result   IMPRESSION:   1.  Postoperative changes of hysterectomy.   2.  A right ovarian simple cyst measures 5.5 cm.   3.  Otherwise unremarkable pelvic ultrasound with Doppler. No   convincing sonographic evidence for ovarian torsion.         TRESA ALBERTO MD            SYSTEM ID:  [id number]          Independent Interpretation   None    ED Course      Medications Administered   Medications   iopamidol (ISOVUE-370) solution 91 mL (91 mLs Intravenous $Given 7/1/24 1912)   SalineFlush (66 mLs Intravenous $Given 7/1/24 1912)   acetaminophen (TYLENOL) tablet 975 mg (975 mg Oral $Given 7/1/24 2144)   docusate sodium (COLACE) capsule 100 mg (100 mg Oral $Given 7/1/24 2144)       Procedures   Procedures     Discussion of Management   None    ED Course   ED Course as of 07/01/24 2207   Mon Jul 01, 2024   1513 I obtained the history and examined the patient as above.        Optional/Additional Documentation  None    Medical Decision Making / Diagnosis   MDM   Ashley Chauhan is a 34 year old female presenting with left lower quadrant abdominal pain, the above workup was undertaken demonstrating a right ovarian cyst however no signs of pathology in the left lower quadrant.  I think it is unlikely that the cyst is causing her current symptoms, she has had these before and she will follow-up with her gynecologist.  She does have a history of endometriosis and this remains in the differential.  CT imaging was undertaken demonstrating no signs of diverticulitis, bowel obstruction, more concerning illness.  Urinalysis was obtained at the urgent care and was unremarkable.  I discussed pelvic exam and the patient defers at this time, she denies any history of sexually transmitted infection or vaginal discharge at this time.  I will discharge her with the medications below and have  recommended follow-up with her gynecologist and return if new symptoms develop, the patient was in agreement this plan of action.    Disposition   The patient was discharged.     Diagnosis     ICD-10-CM    1. Right ovarian cyst  N83.201       2. Abdominal pain, left lower quadrant  R10.32       3. Constipation, unspecified constipation type  K59.00            Discharge Medications   Discharge Medication List as of 7/1/2024  9:40 PM        START taking these medications    Details   docusate sodium (COLACE) 100 MG capsule Take 1 capsule (100 mg) by mouth 2 times daily as needed for constipation, Disp-30 capsule, R-0, E-Prescribe      polyethylene glycol (MIRALAX) 17 GM/Dose powder Take 17 g (1 Capful) by mouth daily for 14 days, Disp-238 g, R-0, E-Prescribe               Scribe Disclosure:  I, Leilani Smith, am serving as a scribe at 4:42 PM on 7/1/2024 to document services personally performed by Brando Collier MD based on my observations and the provider's statements to me.        Brando Collier MD  07/01/24 [id number]

## 2024-07-01 NOTE — PROGRESS NOTES
Assessment & Plan     Dysuria    - UA Macroscopic with reflex to Microscopic and Culture - Clinic Collect         Patient Instructions     Results for orders placed or performed in visit on 07/01/24   UA Macroscopic with reflex to Microscopic and Culture - Clinic Collect     Status: Normal    Specimen: Urine, Midstream   Result Value Ref Range    Color Urine Yellow Colorless, Straw, Light Yellow, Yellow    Appearance Urine Clear Clear    Glucose Urine Negative Negative mg/dL    Bilirubin Urine Negative Negative    Ketones Urine Negative Negative mg/dL    Specific Gravity Urine 1.020 1.003 - 1.035    Blood Urine Negative Negative    pH Urine 7.0 5.0 - 7.0    Protein Albumin Urine Negative Negative mg/dL    Urobilinogen Urine 0.2 0.2, 1.0 E.U./dL    Nitrite Urine Negative Negative    Leukocyte Esterase Urine Negative Negative    Narrative    Microscopic not indicated     Normal UA  UC not indicated.        Return in about 2 days (around 7/3/2024) for with regular provider if symptoms persist.    ELIDIA Hooks [facility] ZACK Ramirez is a 34 year old female who presents to clinic today for the following health issues:  Chief Complaint   Patient presents with    Abdominal Pain     Lower L abdominal pain, swollen/bloating on and off but today the pain is worse-has a hx of cysts and endometriosis        HPI      UTI    Onset of symptoms was 2day(s).  Course of illness is worsening  Severity moderate  Current and associated symptoms dysuria, frequency, and suprapubic pain and pressure  Treatment and measures tried None  Predisposing factors include history of frequent UTI's  Patient denies flank pain, temperature > 101 degrees F., vaginal discharge, vaginal odor, and vaginal itching    Dysuria vs endometriosis/cyst?    Review of Systems  Constitutional, HEENT, cardiovascular, pulmonary, GI, , musculoskeletal, neuro, skin, endocrine and psych systems are negative,  except as otherwise noted.      Objective    /82   Pulse 97   Temp 98.3  F (36.8  C) (Oral)   Resp 16   LMP 03/26/2017 (Exact Date)   SpO2 100%   Physical Exam   GENERAL: alert and no distress  RESP: lungs clear to auscultation - no rales, rhonchi or wheezes  CV: regular rate and rhythm, normal S1 S2, no S3 or S4, no murmur, click or rub, no peripheral edema  ABDOMEN: soft, nontender, no hepatosplenomegaly, no masses and bowel sounds normal  MS: no gross musculoskeletal defects noted, no edema

## 2024-07-01 NOTE — PATIENT INSTRUCTIONS
Results for orders placed or performed in visit on 07/01/24   UA Macroscopic with reflex to Microscopic and Culture - Clinic Collect     Status: Normal    Specimen: Urine, Midstream   Result Value Ref Range    Color Urine Yellow Colorless, Straw, Light Yellow, Yellow    Appearance Urine Clear Clear    Glucose Urine Negative Negative mg/dL    Bilirubin Urine Negative Negative    Ketones Urine Negative Negative mg/dL    Specific Gravity Urine 1.020 1.003 - 1.035    Blood Urine Negative Negative    pH Urine 7.0 5.0 - 7.0    Protein Albumin Urine Negative Negative mg/dL    Urobilinogen Urine 0.2 0.2, 1.0 E.U./dL    Nitrite Urine Negative Negative    Leukocyte Esterase Urine Negative Negative    Narrative    Microscopic not indicated     Normal UA  UC not indicated.

## 2024-07-01 NOTE — ED TRIAGE NOTES
"Pt hx endometriosis. Has had several abdominal surgeries. Pt seen at  for LLQ abdominal pain and advised to come to ED for further work up. NP at  reportedly found a \"mass\" of some sort and wants it evaluated. Pt reports no BM X5d. Pain 9/10     Triage Assessment (Adult)       Row Name 07/01/24 1311          Triage Assessment    Airway WDL WDL        Respiratory WDL    Respiratory WDL WDL        Skin Circulation/Temperature WDL    Skin Circulation/Temperature WDL WDL        Cardiac WDL    Cardiac WDL WDL        Peripheral/Neurovascular WDL    Peripheral Neurovascular WDL WDL        Cognitive/Neuro/Behavioral WDL    Cognitive/Neuro/Behavioral WDL WDL                     "

## 2024-07-02 NOTE — DISCHARGE INSTRUCTIONS
Discharge Instructions  Ovarian Cyst    Abdominal (belly) pain can be caused by many things. Your provider today has found that you have a cyst on the ovary. Women in their reproductive years form cysts every month, but only cause pain if they are very large, or if they rupture and release blood or fluid. Fortunately, they rarely require surgery or hospitalization. The pain from a ruptured cyst usually gets gradually better, and should be much better within a few days. If there is a large cyst, it will usually go away within 1-2 months, but needs to be watched to be sure it does go away, since sometimes a large cyst can become a cancer. There can be complications of a cyst, or other problems that cannot be found right away, so it is very important that you follow up as directed.      Generally, every Emergency Department visit should have a follow-up clinic visit with either a primary or a specialty clinic/provider. Please follow-up as instructed by your emergency provider today.    Return to the Emergency Department right away if:  Your pain becomes much worse or constant  You get an oral temperature above 100.4 F or as directed by your provider.  You have frequent vomiting  You faint, or feel very weak.  You have new symptoms or anything that worries you.    What can I do to help myself?  Take any medication prescribed by your provider.  You may use Tylenol  (acetaminophen) or Advil , Motrin  (ibuprofen) for pain. Be sure to read and follow the package directions, and ask your provider if you have questions.  Avoid sex for several days, because it will probably be painful.    If you were given a prescription for medicine here today, be sure to read all of the information (including the package insert) that comes with your prescription.  This will include important information about the medicine, its side effects, and any warnings that you need to know about.  The pharmacist who fills the prescription can provide  more information and answer questions you may have about the medicine.  If you have questions or concerns that the pharmacist cannot address, please call or return to the Emergency Department.     Remember that you can always come back to the Emergency Department if you are not able to see your regular provider in the amount of time listed above, if you get any new symptoms, or if there is anything that worries you.     Discharge Instructions  Abdominal Pain    Abdominal pain (belly pain) can be caused by many things. Your evaluation today does not show the exact cause for your pain. Your provider today has decided that it is unlikely your pain is due to a life threatening problem, or a problem requiring surgery or hospital admission. Sometimes those problems cannot be found right away, so it is very important that you follow up as directed.  Sometimes only the changes which occur over time allow the cause of your pain to be found.    Generally, every Emergency Department visit should have a follow-up clinic visit with either a primary or a specialty clinic/provider. Please follow-up as instructed by your emergency provider today. With abdominal pain, we often recommend very close follow-up, such as the following day.    ADULTS:  Return to the Emergency Department right away if:    You get an oral temperature above 102oF or as directed by your provider.  You have blood in your stools. This may be bright red or appear as black, tarry stools.    You keep vomiting (throwing up) or cannot drink liquids.  You see blood when you vomit.   You cannot have a bowel movement or you cannot pass gas.  Your stomach gets bloated or bigger.  Your skin or the whites of your eyes look yellow.  You faint.  You have bloody, frequent or painful urination (peeing).  You have new symptoms or anything that worries you.    CHILDREN:  Return to the Emergency Department right away if your child has any of the above-listed symptoms or the  following:    Pushes your hand away or screams/cries when his/her belly is touched.  You notice your child is very fussy or weak.  Your child is very tired and is too tired to eat or drink.  Your child is dehydrated.  Signs of dehydration can be:  Significant change in the amount of wet diapers/urine.  Your infant or child starts to have dry mouth and lips, or no saliva (spit) or tears.    PREGNANT WOMEN:  Return to the Emergency Department right away if you have any of the above-listed symptoms or the following:    You have bleeding, leaking fluid or passing tissue from the vagina.  You have worse pain or cramping, or pain in your shoulder or back.  You have vomiting that will not stop.  You have a temperature of 100oF or more.  Your baby is not moving as much as usual.  You faint.  You get a bad headache with or without eye problems and abdominal pain.  You have a seizure.  You have unusual discharge from your vagina and abdominal pain.    Abdominal pain is pretty common during pregnancy.  Your pain may or may not be related to your pregnancy. You should follow-up closely with your OB provider so they can evaluate you and your baby.  Until you follow-up with your regular provider, do the following:     Avoid sex and do not put anything in your vagina.  Drink clear fluids.  Only take medications approved by your provider.    MORE INFORMATION:    Appendicitis:  A possible cause of abdominal pain in any person who still has their appendix is acute appendicitis. Appendicitis is often hard to diagnose.  Testing does not always rule out early appendicitis or other causes of abdominal pain. Close follow-up with your provider and re-evaluations may be needed to figure out the reason for your abdominal pain.    Follow-up:  It is very important that you make an appointment with your clinic and go to the appointment.  If you do not follow-up with your primary provider, it may result in missing an important development which  "could result in permanent injury or disability and/or lasting pain.  If there is any problem keeping your appointment, call your provider or return to the Emergency Department.    Medications:  Take your medications as directed by your provider today.  Before using over-the-counter medications, ask your provider and make sure to take the medications as directed.  If you have any questions about medications, ask your provider.    Diet:  Resume your normal diet as much as possible, but do not eat fried, fatty or spicy foods while you have pain.  Do not drink alcohol or have caffeine.  Do not smoke tobacco.    Probiotics: If you have been given an antibiotic, you may want to also take a probiotic pill or eat yogurt with live cultures. Probiotics have \"good bacteria\" to help your intestines stay healthy. Studies have shown that probiotics help prevent diarrhea (loose stools) and other intestine problems (including C. diff infection) when you take antibiotics. You can buy these without a prescription in the pharmacy section of the store.     If you were given a prescription for medicine here today, be sure to read all of the information (including the package insert) that comes with your prescription.  This will include important information about the medicine, its side effects, and any warnings that you need to know about.  The pharmacist who fills the prescription can provide more information and answer questions you may have about the medicine.  If you have questions or concerns that the pharmacist cannot address, please call or return to the Emergency Department.       Remember that you can always come back to the Emergency Department if you are not able to see your regular provider in the amount of time listed above, if you get any new symptoms, or if there is anything that worries you.       "

## 2024-09-18 ENCOUNTER — TRANSCRIBE ORDERS (OUTPATIENT)
Dept: OTHER | Age: 34
End: 2024-09-18

## 2024-09-18 DIAGNOSIS — H53.40 VISUAL FIELD DEFECT: ICD-10-CM

## 2024-09-18 DIAGNOSIS — H53.483 GENERALIZED CONTRACTION OF VISUAL FIELD, BILATERAL: Primary | ICD-10-CM

## 2024-09-18 DIAGNOSIS — H55.09 ROTARY NYSTAGMUS: ICD-10-CM

## 2024-10-22 NOTE — PROGRESS NOTES
1. Bilateral unexplained peripheral visual field constriction    Ashley is a 34-year-old female, with a history of progressive peripheral visual field constriction that started when she was a child.  She mentions that this has gotten worse especially during the last 6 months.  She mentions she does have pain in the left eye with eye movement.     She also mentions that she has had lazy eyes when she was younger and had strabismus surgery.  She also has a history of patching her eyes.     On her exam her vision is 20/40 in both eyes, there conjunctival scars nasally in both eyes.  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  Funduscopy shows healthy optic nerve heads and healthy periphery without retinal pigment epithelium abnormalities or vascular attenuation.     On OCT her retinal nerve fiber layer was within normal limits compared to age-matched controls and symmetric between both eyes.  On visual field testing she had significant nasal constriction in the right eye and tunnel vision in the left eye.  Testing showed inconsistencies marked by crossing isopters.  Her ganglion cell layer measurements were unremarkable after the macular OCT was segmented as another indicator that her optic nerve health was normal bilaterally.    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     The patient's visual field defects do not show any correlate on her exam. I suspect there is a non-organic etiology based upon negative MRI and structurally unremarkable exam as well as finger-nose-finger confrontation visual fields being essentially normal bilaterally compared to her severely restricted confrontation visual fields.     I will check a full field electroretinogram to rule-out unlikely possibility  of an occult vernell predominate retinal dystrophy.    Follow-up with me in 3 months in neuro-ophthalmology clinic    Letter to Dr. Lang    Ashley Chauhan is a pleasant 34 year old White female who presents to my neuro-ophthalmology clinic today having been referred by Dr. Lang for intermittent esotropia and visual field defects in both eyes.    HPI:  She started to have issues with her peripheral vision for years, this got worse about 6 months ago her in both eyes with twitching in the left eye and tugging feeling in her left eye if she looks left. Even when she was a kid, she had problems with her peripheral vision. This got worse in her teens and it has slowly been getting worse.  She looks to the left she has some pain in the medial canthal area of the left eye.  He does have redness, tenderness and random tearing in the in both eyes.     She talked to Dr. Lang, who recommended brain imaging - MRI. He wanted to look at the occipital lobe but she had lesions in the occipital lobe.     She also has a history of lazy eye, she has a history of strabismus surgery when she was a kid.  She notes that her eyes were both in and she had surgery on both eyes to make her eyes straight.  She was 1 years when she had the surgery.  She used to patch both her eyes until 6 grade.  She thinks that the eye misalignment now is only in the left eye.  She used to have double vision when she was a kid but not now.     She has a history of migraines, mostly when she was 23, this is getting worse within the past year.  She does not mention any history of auras and her headaches happened suddenly.  Her headaches take hours to go away, she goes into a dark room and sleeps if she can.  She does have sensitivity to light and noise during these episodes.  Her headaches are between 7-8/10 in severity.  She has these headaches 2-3 times a week. She has not consulted with a neurologist but this is going to be her next step.    Independent  historians:  Patient    Review of outside testing:  MRI of the brain with contrast on 8/2024  I reviewed the images myself.  There was no pathology of the anterior or posterior visual pathways that would affect vision.  There was a small pineal cyst that was not sufficiently large to cause pathology.  There were scattered small deep white matter T2 hyperintensities that did not appear concerning for demyelinating disease.  I essentially agree with radiology interpretation with the exception that in the context of the patient's presentation I am not concerned about demyelinating disease.    My interpretation performed today of outside testing:    Review of outside clinical notes:    10/22/24 -- Visit with Dr. Lang          6/20/2024 visit with Dr. Lang    Past medical history:  There is no problem list on file for this patient.  Constipation  Chronic lung disease  Asthma    Patient   Magnesium, progesterone, benzonatate, Docusate, Albuterol, Omeprazole, Flovent disk, Women's gummy vitamins,  List is confirmed today.    Family history / social history:  Patient's family history is not on file.   Mom and dad have bad vision. Her sisters also have bad vision, one got improved with glasses and the other one didn't.      Patient  reports that she has been smoking. She has a 2.5 pack-year smoking history. She has never used smokeless tobacco. She reports current alcohol use. She reports that she does not use drugs.     Exam:  Visual acuity 20/40 right eye 20/40 left eye.  Color vision 11/11 right eye and 11/11 left eye. Pupils dilated  Intraocular pressure 18 right eye and 19 left eye.  Anterior segment exam was significant for mild cataracts that were not visually significant and nasal conjunctival scars indicative of likely prior medial rectus surgery. Fundus exam normal eye exam.  Throughout our testing the patient did exhibit rotary nystagmus.    Tests ordered and interpreted today:    OCT RNFL 20/23/2024 fairly  reliable:  Right eye: mean thickness 105- normal compared to age-matched controls   Left eye: mean thickness 104- normal compared to age-matched controls      Functional visual field 10/23/2024:  Right eye: Severe nasal constriction with collapse of isopter's and some inconsistent responses noted by crossing isopters.  Left eye: Severe peripheral constriction with a small central island of about 20 degrees on I4e isopter testing and 40 degrees on V4e isopter testing.  With collapse of isopter's and some inconsistent responses noted by crossing isopters.    GCL right eye 1.03 mm3      GCL left eye 0.98 mm3           Marco Onofre MD   Fellow, Neuro-Ophthalmology    45 minutes were spent on the date of the encounter by me doing chart review, history and exam, documentation, and further activities as noted above    Complete documentation of historical and exam elements from today's encounter can be found in the full encounter summary report (not reduplicated in this progress note).  I personally obtained the chief complaint(s) and history of present illness.  I confirmed and edited as necessary the review of systems, past medical/surgical history, family history, social history, and examination findings as documented by others; and I examined the patient myself.  I personally reviewed the relevant tests, images, and reports as documented above.  I formulated and edited as necessary the assessment and plan and discussed the findings and management plan with the patient and family.  I personally reviewed the ophthalmic test(s) associated with this encounter, agree with the interpretation(s) as documented by the resident/fellow, and have edited the corresponding report(s) as necessary.     Antony Eastman MD

## 2024-10-23 ENCOUNTER — TELEPHONE (OUTPATIENT)
Dept: OPHTHALMOLOGY | Facility: CLINIC | Age: 34
End: 2024-10-23

## 2024-10-23 ENCOUNTER — OFFICE VISIT (OUTPATIENT)
Dept: OPHTHALMOLOGY | Facility: CLINIC | Age: 34
End: 2024-10-23
Attending: OPHTHALMOLOGY
Payer: COMMERCIAL

## 2024-10-23 DIAGNOSIS — H53.40 VISUAL FIELD DEFECT: Primary | ICD-10-CM

## 2024-10-23 DIAGNOSIS — H53.483 GENERALIZED CONTRACTION OF VISUAL FIELD, BILATERAL: ICD-10-CM

## 2024-10-23 DIAGNOSIS — H53.10 SUBJECTIVE VISUAL DISTURBANCE: ICD-10-CM

## 2024-10-23 DIAGNOSIS — H55.09 ROTARY NYSTAGMUS: ICD-10-CM

## 2024-10-23 PROCEDURE — 99204 OFFICE O/P NEW MOD 45 MIN: CPT | Performed by: OPHTHALMOLOGY

## 2024-10-23 PROCEDURE — 92083 EXTENDED VISUAL FIELD XM: CPT | Performed by: OPHTHALMOLOGY

## 2024-10-23 PROCEDURE — 92133 CPTRZD OPH DX IMG PST SGM ON: CPT | Performed by: OPHTHALMOLOGY

## 2024-10-23 PROCEDURE — G0463 HOSPITAL OUTPT CLINIC VISIT: HCPCS | Performed by: OPHTHALMOLOGY

## 2024-10-23 ASSESSMENT — REFRACTION_WEARINGRX
OS_AXIS: 124
OS_SPHERE: +3.50
OS_HPRISM: 4BI
OD_CYLINDER: +2.50
OD_SPHERE: +2.75
OD_HPRISM: 2BI
OD_AXIS: 059
OS_CYLINDER: +4.50

## 2024-10-23 ASSESSMENT — EXTERNAL EXAM - RIGHT EYE: OD_EXAM: NORMAL

## 2024-10-23 ASSESSMENT — EXTERNAL EXAM - LEFT EYE: OS_EXAM: NORMAL

## 2024-10-23 ASSESSMENT — VISUAL ACUITY
OD_CC: 20/40
OD_CC+: +2
CORRECTION_TYPE: GLASSES
OS_CC: 20/40
OS_CC+: -1
METHOD: SNELLEN - LINEAR

## 2024-10-23 ASSESSMENT — CONF VISUAL FIELD
METHOD: COUNTING FINGERS
OD_SUPERIOR_TEMPORAL_RESTRICTION: 1
OD_INFERIOR_TEMPORAL_RESTRICTION: 1
OS_INFERIOR_TEMPORAL_RESTRICTION: 1
OS_SUPERIOR_TEMPORAL_RESTRICTION: 1

## 2024-10-23 ASSESSMENT — TONOMETRY
IOP_METHOD: ICARE
OS_IOP_MMHG: 19
OD_IOP_MMHG: 18

## 2024-10-23 ASSESSMENT — SLIT LAMP EXAM - LIDS
COMMENTS: NORMAL
COMMENTS: NORMAL

## 2024-10-23 ASSESSMENT — CUP TO DISC RATIO
OS_RATIO: 0.1
OD_RATIO: 0.2

## 2024-10-23 NOTE — LETTER
2024    RE: Ashley Chauhan  : 1990  MRN: [id number]    Dear Dr. Lang    Thank you for referring your patient, Ashley Chauhan, to my neuro-ophthalmology clinic recently.  After a thorough neuro-ophthalmic history and examination, I came to the following conclusions:     1. Bilateral unexplained peripheral visual field constriction    Ashley is a 34-year-old female, with a history of progressive peripheral visual field constriction that started when she was a child.  She mentions that this has gotten worse especially during the last 6 months.  She mentions she does have pain in the left eye with eye movement.     She also mentions that she has had lazy eyes when she was younger and had strabismus surgery.  She also has a history of patching her eyes.     On her exam her vision is 20/40 in both eyes, there conjunctival scars nasally in both eyes.  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  Funduscopy shows healthy optic nerve heads and healthy periphery without retinal pigment epithelium abnormalities or vascular attenuation.     On OCT her retinal nerve fiber layer was within normal limits compared to age-matched controls and symmetric between both eyes.  On visual field testing she had significant nasal constriction in the right eye and tunnel vision in the left eye.  Testing showed inconsistencies marked by crossing isopters.  Her ganglion cell layer measurements were unremarkable after the macular OCT was segmented as another indicator that her optic nerve health was normal bilaterally.    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     The patient's visual field defects do not show any correlate on her exam. I suspect there is a non-organic etiology  based upon negative MRI and structurally unremarkable exam as well as finger-nose-finger confrontation visual fields being essentially normal bilaterally compared to her severely restricted confrontation visual fields.     I will check a full field electroretinogram to rule-out unlikely possibility of an occult vernell predominate retinal dystrophy.    Follow-up with me in 3 months in neuro-ophthalmology clinic    Letter to Dr. Lang    Mamacey IZZY Chauhan is a pleasant 34 year old White female who presents to my neuro-ophthalmology clinic today having been referred by Dr. Lang for intermittent esotropia and visual field defects in both eyes.    HPI:  She started to have issues with her peripheral vision for years, this got worse about 6 months ago her in both eyes with twitching in the left eye and tugging feeling in her left eye if she looks left. Even when she was a kid, she had problems with her peripheral vision. This got worse in her teens and it has slowly been getting worse.  She looks to the left she has some pain in the medial canthal area of the left eye.  He does have redness, tenderness and random tearing in the in both eyes.     She talked to Dr. Lang, who recommended brain imaging - MRI. He wanted to look at the occipital lobe but she had lesions in the occipital lobe.     She also has a history of lazy eye, she has a history of strabismus surgery when she was a kid.  She notes that her eyes were both in and she had surgery on both eyes to make her eyes straight.  She was 1 years when she had the surgery.  She used to patch both her eyes until 6 grade.  She thinks that the eye misalignment now is only in the left eye.  She used to have double vision when she was a kid but not now.     She has a history of migraines, mostly when she was 23, this is getting worse within the past year.  She does not mention any history of auras and her headaches happened suddenly.  Her headaches take hours to go away,  she goes into a dark room and sleeps if she can.  She does have sensitivity to light and noise during these episodes.  Her headaches are between 7-8/10 in severity.  She has these headaches 2-3 times a week. She has not consulted with a neurologist but this is going to be her next step.    Independent historians:  Patient    Review of outside testing:  MRI of the brain with contrast on 8/2024  I reviewed the images myself.  There was no pathology of the anterior or posterior visual pathways that would affect vision.  There was a small pineal cyst that was not sufficiently large to cause pathology.  There were scattered small deep white matter T2 hyperintensities that did not appear concerning for demyelinating disease.  I essentially agree with radiology interpretation with the exception that in the context of the patient's presentation I am not concerned about demyelinating disease.    My interpretation performed today of outside testing:    Review of outside clinical notes:    10/22/24 -- Visit with Dr. Lang          6/20/2024 visit with Dr. Lang    Past medical history:  There is no problem list on file for this patient.  Constipation  Chronic lung disease  Asthma    Patient   Magnesium, progesterone, benzonatate, Docusate, Albuterol, Omeprazole, Flovent disk, Women's gummy vitamins,  List is confirmed today.    Family history / social history:  Patient's family history is not on file.   Mom and dad have bad vision. Her sisters also have bad vision, one got improved with glasses and the other one didn't.      Patient  reports that she has been smoking. She has a 2.5 pack-year smoking history. She has never used smokeless tobacco. She reports current alcohol use. She reports that she does not use drugs.     Exam:  Visual acuity 20/40 right eye 20/40 left eye.  Color vision 11/11 right eye and 11/11 left eye. Pupils dilated  Intraocular pressure 18 right eye and 19 left eye.  Anterior segment exam was significant  for mild cataracts that were not visually significant and nasal conjunctival scars indicative of likely prior medial rectus surgery. Fundus exam normal eye exam.  Throughout our testing the patient did exhibit rotary nystagmus.    Tests ordered and interpreted today:    OCT RNFL 20/23/2024 fairly reliable:  Right eye: mean thickness 105- normal compared to age-matched controls   Left eye: mean thickness 104- normal compared to age-matched controls      Functional visual field 10/23/2024:  Right eye: Severe nasal constriction with collapse of isopter's and some inconsistent responses noted by crossing isopters.  Left eye: Severe peripheral constriction with a small central island of about 20 degrees on I4e isopter testing and 40 degrees on V4e isopter testing.  With collapse of isopter's and some inconsistent responses noted by crossing isopters.    GCL right eye 1.03 mm3      GCL left eye 0.98 mm3        Again, thank you for trusting me with the care of your patient.  For further exam details, please feel free to contact our office for additional records.  If you wish to contact me regarding this patient please email me at [email or web address] or give my clinic a call to arrange a phone conversation.    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

## 2024-10-23 NOTE — TELEPHONE ENCOUNTER
10-23-24  Retrieved ERG Referral from [facility].  Last visit w/Dr Eastman today, requesting ffERG within one month and before rtn visit 02-05.  Briefly informed, usually appts available Mon-Tue-Wed at 8:00 or 12:00.  Expected duration ~2 hours and eyes will dilated.  Given my direct#, [phone].  LVM#1.    NOTE:  Tentative ffERG slots:   11-04 at 8:00      11-06 at 2:00      11-13 at 1:00    10-28-24  Retrieved VM from pt and called pt.  Agreed upon date and time.      TT will route to [facility] to schedule:     Date and time = Wed Nov 13 at 1:00  Eye, Electrodiagnostic == ffERG  Attending = Kari  Referring = Jaren  Appt Notes = kathryn unexpl peripheral vf constr//ffERG (, TT to do)  Duration = 2.5 hours  Message = chart at  for TT     Please send info packet w/appt reminder, map/directions and handouts.    Please oliver 12:00-1:00 as unavailable.

## 2024-11-13 ENCOUNTER — ALLIED HEALTH/NURSE VISIT (OUTPATIENT)
Dept: OPHTHALMOLOGY | Facility: CLINIC | Age: 34
End: 2024-11-13
Attending: OPHTHALMOLOGY
Payer: COMMERCIAL

## 2024-11-13 DIAGNOSIS — H53.40 VISUAL FIELD DEFECT: ICD-10-CM

## 2024-11-13 DIAGNOSIS — H55.09 ROTARY NYSTAGMUS: ICD-10-CM

## 2024-11-13 DIAGNOSIS — H35.89 OTHER SPECIFIED RETINAL DISORDERS: Primary | ICD-10-CM

## 2024-11-13 DIAGNOSIS — H35.89 OTHER SPECIFIED RETINAL DISORDERS: ICD-10-CM

## 2024-11-13 PROCEDURE — 92273 FULL FIELD ERG W/I&R: CPT

## 2024-11-13 PROCEDURE — 92273 FULL FIELD ERG W/I&R: CPT | Mod: 26 | Performed by: OPHTHALMOLOGY

## 2024-11-13 PROCEDURE — 999N000103 HC STATISTIC NO CHARGE FACILITY FEE

## 2024-11-13 ASSESSMENT — REFRACTION_WEARINGRX
OS_AXIS: 124
OD_AXIS: 059
OD_CYLINDER: +2.50
OS_SPHERE: +3.50
OS_HPRISM: 4BI
OS_CYLINDER: +4.50
OD_HPRISM: 2BI
OD_SPHERE: +2.75

## 2024-11-13 ASSESSMENT — VISUAL ACUITY
CORRECTION_TYPE: GLASSES
METHOD: SNELLEN - LINEAR
OS_CC: 20/70
OS_PH_CC: 20/60
OD_CC: 20/40
OS_CC+: +2

## 2024-11-13 NOTE — LETTER
"2024      RE: Maqasimkajal IZZY Chauhan  [address]  [place] [address]       STANDARD ffERG REPORT    ffERG Date:  2024    Referring:  Dr Eastman     RE:  Ashley Chauhan  MRN:  [id number]  :  1990    CLINICAL HISTORY: Referred by Dr Eastman for ffERG.  Last eye exam with Dr Eastman 10-23-24:  Bilateral unexplained peripheral visual field constriction.  History of progressive peripheral visual field constriction that started when she was a child.  She mentions that this has gotten worse especially during the last 6 months.  The patient's visual field defects do not show any correlate on her exam. I suspect there is a non-organic etiology based upon negative MRI and structurally unremarkable exam as well as finger-nose-finger confrontation visual fields being essentially normal bilaterally compared to her severely restricted confrontation visual fields.  I will check a full field electroretinogram to rule-out unlikely possibility of an occult vernell predominate retinal dystrophy.  Pt states she has never had depth perception.  S/P strabismus surgery as a child d/t \"eye muscles never developing\".  She has a right leg that is 3 inches shorter than her left.  Tripping over things and bumping into doorways but not because of shorter right leg.  She adjusts way better in the dark vs light.  She has always been very light sensitive.  No problems w/color vision.  Scheduled to return to Dr Eastman ; will await results.    IMPRESSION:  Normal ffERG                  Visual Acuity Right Eye : 20/40, PHNI      W/gls, +2.75 + 2.50x059, 2BI    Visual Acuity Left Eye : 20/70+2, PH 20/60      +3.50 + 4.50x124, 4BI                                                            ALL AVERAGED                 Data for Full-Field ERG  Right Eye  Left Eye   DARK-ADAPTED Patient Normal Patient   0.01 ERG (vernell) b-wave amplitude ( v) 230 50.3 to 387.3 168   0.01 ERG (vernell) b-wave implicit time (ms) " 77.5 70.7 to 112.5 96         3.0 ERG (combined) a-wave amplitude ( v) -188 -319.8 to -82 -130   3.0 ERG (combined) a-wave implicit time (ms) 15.5 11.1 to 18.9 15.5   3.0 ERG (combined) b-wave amplitude ( v) 265* 146.4 to 500.8 208   3.0 ERG (combined) b-wave implicit time (ms) 54.5* 37.1 to 56.7 53         10.0 ERG (brighter combined)a-wave amplitude ( v) -214 -308 to -94.8 -147   10.0 ERG (brighter combined)a-wave implicit time (ms) 12 10.3 to 13.1 13   10.0 ERG (brighter combined)b-wave amplitude ( v) 246* 171.7 to 479.3 202*   10.0 ERG (brighter combined)b-wave implicit time (ms) 48* 33.7 to 54.1 50*         3.0 Oscillatory Potentials  present    LIGHT-ADAPTED       3.0 Flicker (30Hz) amplitude ( v) 104 59.2 to 158.6 70   3.0 Flicker (30Hz) implicit time (ms) 27.5 21.8 to 29.6 27         3.0 ERG (cone) a-wave amplitude ( v) -26 -53.7 to -10.9 -20   3.0 ERG (cone) a-wave implicit time (ms) 13.5 10.6 to 16 13.5   3.0 ERG (cone) b-wave amplitude ( v) 109 72.3 to 185.3 76   3.0 ERG (cone) b-wave implicit time (ms) 30.5 25.7 to 32.1 30.5   * = manipulated cursors  parentheses = cursors at selected peaks  ---- = residual to non-measurable  xxxx = not tested    Tech notes: ffERG discussed and performed with E3 system.  Instructed on importance of maintaining fixation and relaxation for optimum recordings.  Equally dilated ~8mm.  Slow to dilate.  Very deep-set eyes and prominent brow.  Tolerated dtl nicely.  Relatively narrow eye openings and shallow fornices but equal and adequate eye opening with easy effort to clear dilated pupils as needed.  Impedances low and comparable throughout.  No difficulty with blinking.  Specifically, no eyelid flutter to bright flashes DA+LA and none observed with flicker.  On-Off (3-step, E3) also done due to reduced b-wave for  DA 10.0ERG, not electronegative.   NOTE:  +Nystagmus and intermittent drift of left eye.  Also no difficulty w/blinking but there is tendency for muscle twitch of  upper eyelids just after flash presentation.      Interpretation:  The electroretinogram was performed according to ISCEV standards using "[employer]ION E3 system and DTL fiber recording electrodes. The patient tolerated the testing well. The waveforms are fairly reproducible and well formed. The responses in between both eyes were similar.  The normative values provided above represent the 95% confidence limits for a normal individual the age of the patient. The patient s responses are averaged.  In scotopic conditions, the vernell specific responses were normal in both eyes.  The maximal response, a combined vernell and cone responses were essentially normal and the implicit time was normal in both eyes.   In light adapted conditions, the 30-Hz flicker response has a normal amplitude and normal implicit time in both eyes. The single photopic flash response are normal.  The on and off responses were normal.    Conclusion:   This represents a likely normal electroretinogram. There is no evidence of significant vernell or cone photoreceptor abnormalities in either eye. There is some asymmetry in the responses in between both eyes with right eye responses greater than left eye. This is of unclear clinical significance.    Clinical correlation is recommended.       I would recommend a repeated electroretinogram in a couple of years if there continues to be concern regarding a possible retinal dystrophy.     Dear Antony, thank you for the opportunity to provide electrophysiologic services for this patient.  Please do not hesitate to call if there should be any questions regarding these results.    Mariama Pierce MD  Professor of Ophthalmology  Vitreoretinal Surgeon  Electrophysiology Service   [facility] Endowed Chair  Department of Ophthalmology & Visual Neurosciences   [facility]  Phone: [phone]   Fax: [phone]              [facility] FULL FIELD ERG

## 2024-11-13 NOTE — NURSING NOTE
"Referred by Dr Eastman for ffERG.  Last eye exam w/Dr Eastman 10-23-24:  Bilateral unexplained peripheral visual field constriction.  History of progressive peripheral visual field constriction that started when she was a child.  She mentions that this has gotten worse especially during the last 6 months.  The patient's visual field defects do not show any correlate on her exam. I suspect there is a non-organic etiology based upon negative MRI and structurally unremarkable exam as well as finger-nose-finger confrontation visual fields being essentially normal bilaterally compared to her severely restricted confrontation visual fields.  I will check a full field electroretinogram to rule-out unlikely possibility of an occult vernell predominate retinal dystrophy.  Pt states she has never had depth perception.  S/P strabismus surgery as a child d/t \"eye muscles never developing\".  She has a right leg that is 3 inches shorter than her left.  Tripping over things and bumping into doorways but not because of shorter right leg.  She adjusts way better in the dark vs light.  She has always been very light sensitive.  No problems w/color vision.  Scheduled to return to Dr Eastman 02-05; will await results.  "

## 2024-11-13 NOTE — PROGRESS NOTES
"STANDARD ffERG REPORT    ffERG Date:  2024    Referring:  Dr Eastman     RE:  Ashley Chauhan  MRN:  [id number]  :  1990    CLINICAL HISTORY: Referred by Dr Eastman for ffERG.  Last eye exam with Dr Eastman 10-23-24:  Bilateral unexplained peripheral visual field constriction.  History of progressive peripheral visual field constriction that started when she was a child.  She mentions that this has gotten worse especially during the last 6 months.  The patient's visual field defects do not show any correlate on her exam. I suspect there is a non-organic etiology based upon negative MRI and structurally unremarkable exam as well as finger-nose-finger confrontation visual fields being essentially normal bilaterally compared to her severely restricted confrontation visual fields.  I will check a full field electroretinogram to rule-out unlikely possibility of an occult vernell predominate retinal dystrophy.  Pt states she has never had depth perception.  S/P strabismus surgery as a child d/t \"eye muscles never developing\".  She has a right leg that is 3 inches shorter than her left.  Tripping over things and bumping into doorways but not because of shorter right leg.  She adjusts way better in the dark vs light.  She has always been very light sensitive.  No problems w/color vision.  Scheduled to return to Dr Eastman ; will await results.    IMPRESSION:  Normal ffERG                  Visual Acuity Right Eye : 20/40, PHNI      W/gls, +2.75 + 2.50x059, 2BI    Visual Acuity Left Eye : 20/70+2, PH 20/60      +3.50 + 4.50x124, 4BI                                                            ALL AVERAGED                 Data for Full-Field ERG  Right Eye  Left Eye   DARK-ADAPTED Patient Normal Patient   0.01 ERG (vernell) b-wave amplitude ( v) 230 50.3 to 387.3 168   0.01 ERG (vernell) b-wave implicit time (ms) 77.5 70.7 to 112.5 96         3.0 ERG (combined) a-wave amplitude ( v) -188 -319.8 to -82 " -130   3.0 ERG (combined) a-wave implicit time (ms) 15.5 11.1 to 18.9 15.5   3.0 ERG (combined) b-wave amplitude ( v) 265* 146.4 to 500.8 208   3.0 ERG (combined) b-wave implicit time (ms) 54.5* 37.1 to 56.7 53         10.0 ERG (brighter combined)a-wave amplitude ( v) -214 -308 to -94.8 -147   10.0 ERG (brighter combined)a-wave implicit time (ms) 12 10.3 to 13.1 13   10.0 ERG (brighter combined)b-wave amplitude ( v) 246* 171.7 to 479.3 202*   10.0 ERG (brighter combined)b-wave implicit time (ms) 48* 33.7 to 54.1 50*         3.0 Oscillatory Potentials  present    LIGHT-ADAPTED       3.0 Flicker (30Hz) amplitude ( v) 104 59.2 to 158.6 70   3.0 Flicker (30Hz) implicit time (ms) 27.5 21.8 to 29.6 27         3.0 ERG (cone) a-wave amplitude ( v) -26 -53.7 to -10.9 -20   3.0 ERG (cone) a-wave implicit time (ms) 13.5 10.6 to 16 13.5   3.0 ERG (cone) b-wave amplitude ( v) 109 72.3 to 185.3 76   3.0 ERG (cone) b-wave implicit time (ms) 30.5 25.7 to 32.1 30.5   * = manipulated cursors  parentheses = cursors at selected peaks  ---- = residual to non-measurable  xxxx = not tested    Tech notes: ffERG discussed and performed with E3 system.  Instructed on importance of maintaining fixation and relaxation for optimum recordings.  Equally dilated ~8mm.  Slow to dilate.  Very deep-set eyes and prominent brow.  Tolerated dtl nicely.  Relatively narrow eye openings and shallow fornices but equal and adequate eye opening with easy effort to clear dilated pupils as needed.  Impedances low and comparable throughout.  No difficulty with blinking.  Specifically, no eyelid flutter to bright flashes DA+LA and none observed with flicker.  On-Off (3-step, E3) also done due to reduced b-wave for  DA 10.0ERG, not electronegative.   NOTE:  +Nystagmus and intermittent drift of left eye.  Also no difficulty w/blinking but there is tendency for muscle twitch of upper eyelids just after flash presentation.      Interpretation:  The electroretinogram  was performed according to ISCEV standards using [employer] E3 system and DTL fiber recording electrodes. The patient tolerated the testing well. The waveforms are fairly reproducible and well formed. The responses in between both eyes were similar.  The normative values provided above represent the 95% confidence limits for a normal individual the age of the patient. The patient s responses are averaged.  In scotopic conditions, the vernell specific responses were normal in both eyes.  The maximal response, a combined vernell and cone responses were essentially normal and the implicit time was normal in both eyes.   In light adapted conditions, the 30-Hz flicker response has a normal amplitude and normal implicit time in both eyes. The single photopic flash response are normal.  The on and off responses were normal.    Conclusion:   This represents a likely normal electroretinogram. There is no evidence of significant vernell or cone photoreceptor abnormalities in either eye. There is some asymmetry in the responses in between both eyes with right eye responses greater than left eye. This is of unclear clinical significance.    Clinical correlation is recommended.       I would recommend a repeated electroretinogram in a couple of years if there continues to be concern regarding a possible retinal dystrophy.     Jammie Hardy, thank you for the opportunity to provide electrophysiologic services for this patient.  Please do not hesitate to call if there should be any questions regarding these results.    Mariama Pierce MD  Professor of ophthalmology  Retina Service   Department of Ophthalmology and Visual Neurosciences   [facility]  Phone: [phone]   Fax: [phone]

## 2024-11-14 ENCOUNTER — TELEPHONE (OUTPATIENT)
Dept: OPHTHALMOLOGY | Facility: CLINIC | Age: 34
End: 2024-11-14
Payer: COMMERCIAL

## 2024-11-14 NOTE — TELEPHONE ENCOUNTER
Talked to the patient, she is glad that her ERG was with in normal range. She agreed to her next appointment on Feb, 2024. All questions verbalized. She would call if she has any worsening symptoms.       Marco Onofre MD   PGY-5 Neuro-ophthalmology Fellow  Department of Ophthalmology   [facility]

## 2025-04-02 ENCOUNTER — OFFICE VISIT (OUTPATIENT)
Dept: URGENT CARE | Facility: URGENT CARE | Age: 35
End: 2025-04-02

## 2025-04-02 VITALS
TEMPERATURE: 98.7 F | HEART RATE: 100 BPM | DIASTOLIC BLOOD PRESSURE: 94 MMHG | SYSTOLIC BLOOD PRESSURE: 131 MMHG | OXYGEN SATURATION: 97 % | RESPIRATION RATE: 18 BRPM | BODY MASS INDEX: 39.65 KG/M2 | WEIGHT: 203 LBS

## 2025-04-02 DIAGNOSIS — H57.12 EYE PAIN, LEFT: Primary | ICD-10-CM

## 2025-04-02 PROCEDURE — 99214 OFFICE O/P EST MOD 30 MIN: CPT | Performed by: STUDENT IN AN ORGANIZED HEALTH CARE EDUCATION/TRAINING PROGRAM

## 2025-04-02 RX ORDER — ESOMEPRAZOLE MAGNESIUM 40 MG/1
CAPSULE, DELAYED RELEASE ORAL
COMMUNITY
Start: 2025-01-13

## 2025-04-02 RX ORDER — RIZATRIPTAN BENZOATE 10 MG/1
TABLET, ORALLY DISINTEGRATING ORAL
COMMUNITY
Start: 2025-02-27

## 2025-04-02 RX ORDER — TOPIRAMATE 25 MG/1
TABLET, FILM COATED ORAL
COMMUNITY
Start: 2025-02-04

## 2025-04-02 RX ORDER — LINACLOTIDE 72 UG/1
CAPSULE, GELATIN COATED ORAL
COMMUNITY
Start: 2025-02-03

## 2025-04-02 RX ORDER — NORETHINDRONE ACETATE 5 MG/1
1 TABLET ORAL
COMMUNITY
Start: 2025-02-28

## 2025-04-02 NOTE — PATIENT INSTRUCTIONS
Go to [facility] for evaluation of left eye pain and blurry vision.   [address], [address], [place]

## 2025-04-02 NOTE — PROGRESS NOTES
ASSESSMENT & PLAN:   Diagnoses and all orders for this visit:  Eye pain, left    Left eye pain and blurry vision since this AM.  Eye exam is grossly normal with no fluorescein uptake. Does have left eye pain at rest and with EOM.   Visual acuity 20/200 L and 20/50 R.  Recommend evaluation by ophthalmology. Nursing staff was able to schedule appointment for patient at [facility] down the street. Patient's partner will take her there.     Patient Instructions   Go to [facility] for evaluation of left eye pain and blurry vision.   [address], [address], [place]    No follow-ups on file.    At the end of the encounter, I discussed results, diagnosis, medications. Discussed red flags for immediate return to clinic/ER, as well as indications for follow up if no improvement. Patient and/or caregiver understood and agreed to plan. Patient was stable for discharge.    ------------------------------------------------------------------------  SUBJECTIVE  History was obtained from patient.    Patient presents with:  Urgent Care  Conjunctivitis: Crusted shut this morning, red, itching and burning in bilateral eyes   Blurry vision in left eye also sensitivity to light, also complains of HA behind the left eye.  used warm/cold compression, flushed both eyes with water several times  Otalgia: Left ear pain onset today around 10:30 AM   Took Tylenol 1000 mg this morning     FILOMENA  Ashley Chauhan is a(n) 35 year old female presenting to urgent care for left eye pain that began this morning. Had bilateral eye crusting when she woke up. Since then has been having left eye pain, tearing, decreased/blurry vision (worse than baseline). Has headache behind left eye. No FB sensation. Does not wear contacts.    Current Outpatient Medications   Medication Sig Dispense Refill    albuterol (PROAIR HFA/PROVENTIL HFA/VENTOLIN HFA) 108 (90 Base) MCG/ACT inhaler Inhale 2 puffs into the lungs every 4 hours as needed for shortness of  breath / dyspnea or wheezing 1 Inhaler 0    ALBUTEROL IN Inhale into the lungs.      docusate sodium (COLACE) 100 MG capsule Take 1 capsule (100 mg) by mouth 2 times daily as needed for constipation 30 capsule 0    EPINEPHrine (ANY BX GENERIC EQUIV) 0.3 MG/0.3ML injection 2-pack Inject 1 Pen into the muscle      esomeprazole (NEXIUM) 40 MG DR capsule TAKE 1 CAPSULE BY MOUTH TWICE DAILY 1 HOUR BEFORE MEALS      FLUoxetine (PROZAC) 20 MG capsule Take 20 mg by mouth      fluticasone (FLOVENT DISKUS) 100 MCG/ACT inhaler Inhale 1 puff into the lungs      GALLIFREY 5 MG tablet Take 1 tablet by mouth daily at 2 pm.      LINZESS 72 MCG capsule TAKE 1 CAPSULE BY MOUTH EVERY DAY 30 MINUTES BEFORE FIRST MEAL OF THE DAY ON AN EMPTY STOMACH      rizatriptan (MAXALT-MLT) 10 MG ODT DISSOLVE 1 TABLET IN MOUTH AS DIRECTED. MAY REPEAT AFTER TWO HOURS      topiramate (TOPAMAX) 25 MG tablet TAKE 25MG AT BEDTIME FOR 1 WEEK THEN INCREASE TO 25MG TWICE DAILY      ALPRAZolam (XANAX) 0.5 MG tablet TAKE 1 TABLET BY MOUTH ONE TIME AS NEEDED FOR ANXIETY 30 MINUTES PRIOR TO MRI (Patient not taking: Reported on 4/2/2025)      metoclopramide (REGLAN) 10 MG tablet Take 10 mg by mouth (Patient not taking: Reported on 4/2/2025)      Pediatric Multiple Vit-C-FA (FLINSTONES GUMMIES OMEGA-3 DHA) CHEW  (Patient not taking: Reported on 4/2/2025)      predniSONE (DELTASONE) 20 MG tablet Take two tablets (= 40mg) each day for 5 (five) days (Patient not taking: Reported on 4/2/2025) 10 tablet 0    progesterone, in sesame oil, 50 MG/ML injection Inject 0 mg into the muscle one time. (Patient not taking: Reported on 4/2/2025)       Problem List:  There are no relevant problems documented for this patient.    Allergies   Allergen Reactions    Aspirin Anaphylaxis, Hives and Swelling    Bee Venom Anaphylaxis and Other (See Comments)    Sodium Hypochlorite Hives     Other Reaction(s): Throat Swelling/Closing    Inhalation, touch    Amoxicillin Nausea and Vomiting     Doxycycline Hives    Ibuprofen Itching    Lortab [Hydrocodone-Acetaminophen] Nausea and Vomiting    Mushroom Nausea and Vomiting    Mushroom Extract Complex (Obsolete) Nausea and Vomiting    Penicillin G Nausea and Vomiting    Pineapple Nausea and Vomiting         OBJECTIVE  Vitals:    04/02/25 1440   BP: (!) 131/94   BP Location: Left arm   Patient Position: Sitting   Cuff Size: Adult Large   Pulse: 100   Resp: 18   Temp: 98.7  F (37.1  C)   TempSrc: Tympanic   SpO2: 97%   Weight: 92.1 kg (203 lb)     Physical Exam   GENERAL: healthy, alert, no acute distress.   PSYCH: mentation appears normal. Normal affect  HEAD: normocephalic, atraumatic.  EYE: PERRL. EOMs intact with pain on left. No scleral injection on right. Minimal scleral injection on left. No fluorescein uptake on exam. No eyelid edema or erythema bilaterally.  NOSE: external nose atraumatic without lesions.  LUNGS: no increased work of breathing.     No results found for any visits on 04/02/25.

## 2025-04-15 ENCOUNTER — HOSPITAL ENCOUNTER (OUTPATIENT)
Dept: MAMMOGRAPHY | Facility: CLINIC | Age: 35
Discharge: HOME | End: 2025-04-15
Attending: OBSTETRICS & GYNECOLOGY
Payer: COMMERCIAL

## 2025-04-15 DIAGNOSIS — N64.4 BREAST PAIN, RIGHT: ICD-10-CM

## 2025-04-15 DIAGNOSIS — N63.10 LUMP OF BREAST, RIGHT: ICD-10-CM

## 2025-04-15 DIAGNOSIS — N64.52 NIPPLE DISCHARGE: ICD-10-CM

## 2025-04-15 PROCEDURE — 77062 BREAST TOMOSYNTHESIS BI: CPT

## 2025-04-15 PROCEDURE — 76642 ULTRASOUND BREAST LIMITED: CPT | Mod: RT

## 2025-04-15 NOTE — PROGRESS NOTES
Radiologist, Dr Grimaldo, recommends right breast ultrasound guided needle biopsy.     While patient was at [facility], I scheduled pt for breast biopsy appt on 4/23/25, arrival at 0715 for 0730 appt, at [facility], [address], [address], [place], [place]. [phone].      I reviewed the procedure and the written pre and post breast biopsy pt handouts with patient and gave patient the written pre and post biopsy patient handouts to take home.     Pt's questions were answered.    Pt verbalizes understanding of procedure and appt location, date, and time. Calls welcomed.      Tammi Sarmiento RN  RN Imaging Care Coordinator  [facility]

## 2025-04-16 ENCOUNTER — OFFICE VISIT (OUTPATIENT)
Dept: URGENT CARE | Facility: URGENT CARE | Age: 35
End: 2025-04-16
Payer: COMMERCIAL

## 2025-04-16 VITALS
SYSTOLIC BLOOD PRESSURE: 119 MMHG | HEART RATE: 81 BPM | DIASTOLIC BLOOD PRESSURE: 81 MMHG | BODY MASS INDEX: 40.44 KG/M2 | OXYGEN SATURATION: 98 % | TEMPERATURE: 98.9 F | RESPIRATION RATE: 18 BRPM | WEIGHT: 206 LBS | HEIGHT: 60 IN

## 2025-04-16 DIAGNOSIS — R10.9 CHRONIC ABDOMINAL PAIN: ICD-10-CM

## 2025-04-16 DIAGNOSIS — G89.29 CHRONIC ABDOMINAL PAIN: ICD-10-CM

## 2025-04-16 DIAGNOSIS — R30.0 DYSURIA: Primary | ICD-10-CM

## 2025-04-16 PROBLEM — R87.612 LGSIL OF CERVIX OF UNDETERMINED SIGNIFICANCE: Status: ACTIVE | Noted: 2019-05-07

## 2025-04-16 PROBLEM — N80.9 ENDOMETRIOSIS: Status: ACTIVE | Noted: 2017-03-22

## 2025-04-16 LAB
ALBUMIN UR-MCNC: NEGATIVE MG/DL
APPEARANCE UR: CLEAR
BILIRUB UR QL STRIP: NEGATIVE
CLUE CELLS: ABNORMAL
COLOR UR AUTO: YELLOW
GLUCOSE UR STRIP-MCNC: NEGATIVE MG/DL
HGB UR QL STRIP: NEGATIVE
KETONES UR STRIP-MCNC: NEGATIVE MG/DL
LEUKOCYTE ESTERASE UR QL STRIP: NEGATIVE
NITRATE UR QL: NEGATIVE
PH UR STRIP: 6 [PH] (ref 5–7)
SP GR UR STRIP: 1.02 (ref 1–1.03)
TRICHOMONAS, WET PREP: ABNORMAL
UROBILINOGEN UR STRIP-ACNC: 0.2 E.U./DL
WBC'S/HIGH POWER FIELD, WET PREP: ABNORMAL
YEAST, WET PREP: ABNORMAL

## 2025-04-16 PROCEDURE — 81003 URINALYSIS AUTO W/O SCOPE: CPT | Performed by: NURSE PRACTITIONER

## 2025-04-16 PROCEDURE — 87210 SMEAR WET MOUNT SALINE/INK: CPT | Performed by: NURSE PRACTITIONER

## 2025-04-16 PROCEDURE — 3074F SYST BP LT 130 MM HG: CPT | Performed by: NURSE PRACTITIONER

## 2025-04-16 PROCEDURE — 99213 OFFICE O/P EST LOW 20 MIN: CPT | Performed by: NURSE PRACTITIONER

## 2025-04-16 PROCEDURE — 3079F DIAST BP 80-89 MM HG: CPT | Performed by: NURSE PRACTITIONER

## 2025-04-16 NOTE — PROGRESS NOTES
Urgent Care Clinic Visit     Chief Complaint   Patient presents with    Urgent Care     UTI sx - frequency, cloudy urine and lower abdominal pain x 2 days                4/16/2025     4:33 PM   Additional Questions   Roomed by Ronel     Pre-Provider Visit Orders- Urinalysis UA/UC  Patient reports the following symptoms:  frequent urination  and cloudy urine   Does the patient report any of the following symptoms: vaginal discharge, vaginal itching, possible yeast infection, has a urinary catheter in place, or unable to void in a specimen cup?

## 2025-04-16 NOTE — PROGRESS NOTES
Chief Complaint   Patient presents with    Urgent Care     UTI sx - frequency, cloudy urine and lower abdominal pain x 2 days          ICD-10-CM    1. Dysuria  R30.0 UA Macroscopic with reflex to Microscopic and Culture - Clinic Collect     Wet prep - Clinic Collect      2. Chronic abdominal pain  R10.9     G89.29       Encourage patient to push water, monitor symptoms and if they do not resolve in a week to consider returning for repeat urinalysis.  Follow-up with your other providers regarding your chronic abdominal pain.    Red flag warning signs and when to go to the emergency room discussed.        Results for orders placed or performed in visit on 04/16/25 (from the past 24 hours)   UA Macroscopic with reflex to Microscopic and Culture - Clinic Collect    Specimen: Urine, Clean Catch   Result Value Ref Range    Color Urine Yellow Colorless, Straw, Light Yellow, Yellow    Appearance Urine Clear Clear    Glucose Urine Negative Negative mg/dL    Bilirubin Urine Negative Negative    Ketones Urine Negative Negative mg/dL    Specific Gravity Urine 1.025 1.003 - 1.035    Blood Urine Negative Negative    pH Urine 6.0 5.0 - 7.0    Protein Albumin Urine Negative Negative mg/dL    Urobilinogen Urine 0.2 0.2, 1.0 E.U./dL    Nitrite Urine Negative Negative    Leukocyte Esterase Urine Negative Negative    Narrative    Microscopic not indicated   Wet prep - Clinic Collect    Specimen: Vagina; Swab   Result Value Ref Range    Trichomonas Absent Absent    Yeast Absent Absent    Clue Cells Absent Absent    WBCs/high power field 1+ (A) None       Subjective     Ashley Chauhan is an 35 year old female who presents to clinic today for dysuria for about 2 days.  She has also felt some urgency.  She does have some lower abdominal pain but this is chronic for her.  She denies any fever, chills, new low back pain or new abdominal pain.  She denies any concerns about sexually transmitted diseases and has had no vaginal  discharge.    ROS: 10 point ROS neg other than the symptoms noted above in the HPI.       Objective    /81   Pulse 81   Temp 98.9  F (37.2  C) (Tympanic)   Resp 18   Ht 1.524 m (5')   Wt 93.4 kg (206 lb)   LMP 03/26/2017 (Exact Date)   SpO2 98%   BMI 40.23 kg/m    Nurses notes and VS have been reviewed.    Physical Exam       GENERAL APPEARANCE: healthy appearing, alert     ABDOMEN:  soft, nontender, no HSM or masses and bowel sounds normal, no CVA tenderness     MS: extremities normal- no gross deformities noted; normal muscle tone.     SKIN: no suspicious lesions or rashes      ELIDIA Garza, CNP  [facility] Provider    The use of Dragon/[employer] dictation services may have been used to construct the content in this note; any grammatical or spelling errors are non-intentional. Please contact the author of this note directly if you are in need of any clarification.

## 2025-04-20 ENCOUNTER — HEALTH MAINTENANCE LETTER (OUTPATIENT)
Age: 35
End: 2025-04-20

## 2025-04-23 ENCOUNTER — HOSPITAL ENCOUNTER (OUTPATIENT)
Dept: MAMMOGRAPHY | Facility: CLINIC | Age: 35
Discharge: HOME | End: 2025-04-23
Attending: OBSTETRICS & GYNECOLOGY
Payer: COMMERCIAL

## 2025-04-23 DIAGNOSIS — N64.4 BREAST PAIN, RIGHT: ICD-10-CM

## 2025-04-23 DIAGNOSIS — N64.52 NIPPLE DISCHARGE: ICD-10-CM

## 2025-04-23 DIAGNOSIS — N63.10 LUMP OF BREAST, RIGHT: ICD-10-CM

## 2025-04-23 PROCEDURE — 88305 TISSUE EXAM BY PATHOLOGIST: CPT | Mod: 26 | Performed by: PATHOLOGY

## 2025-04-23 PROCEDURE — 88305 TISSUE EXAM BY PATHOLOGIST: CPT | Mod: TC | Performed by: OBSTETRICS & GYNECOLOGY

## 2025-04-23 PROCEDURE — 19083 BX BREAST 1ST LESION US IMAG: CPT | Mod: RT

## 2025-04-23 PROCEDURE — 250N000009 HC RX 250: Performed by: OBSTETRICS & GYNECOLOGY

## 2025-04-23 PROCEDURE — 999N000065 MA POST PROCEDURE RIGHT

## 2025-04-23 PROCEDURE — 250N000011 HC RX IP 250 OP 636: Performed by: OBSTETRICS & GYNECOLOGY

## 2025-04-23 RX ORDER — LIDOCAINE HYDROCHLORIDE AND EPINEPHRINE 10; 10 MG/ML; UG/ML
10 INJECTION, SOLUTION INFILTRATION; PERINEURAL ONCE
Status: COMPLETED | OUTPATIENT
Start: 2025-04-23 | End: 2025-04-23

## 2025-04-23 RX ADMIN — LIDOCAINE HYDROCHLORIDE AND EPINEPHRINE 10 ML: 10; 10 INJECTION, SOLUTION INFILTRATION; PERINEURAL at 08:00

## 2025-04-23 RX ADMIN — LIDOCAINE HYDROCHLORIDE 10 ML: 10 INJECTION, SOLUTION INFILTRATION; PERINEURAL at 08:00

## 2025-04-23 NOTE — PROGRESS NOTES
Ashley arrived at [facility]. I escorted pt to the Breast [place] consult room. Pt was identified using full name and . Wristband is on pt wrist. Pt was able to state which procedure and correct side breast biopsy was occurring today. I reviewed the consent form with the pt and pt was given the consent form to review before signing.     I reviewed post breast biopsy care. Pt acknowledged understanding of post biopsy care. Pt was given written post breast biopsy care handout to take home.    I explained results are expected in 3-5 business days and Breast Center RN will call pt at number pt provided today following Radiologist's review of results. Pt has active [employer], therefore, I explained pathology result alert may occur and reach pt before Radiologist receives and reviews pathology, imaging, and provides correlation to the RN. It is up to pt to decide to view results or wait for RN call. Pt verbally requests the pathology results NOT be automatically released into [employer] until the Radiologist reviews results and imaging, and the RN calls pt with results. Pt verbalizes understanding and agreement of plan.    Pt had no questions or concerns.     I escorted pt to the changing room and pt changed into gown. Pt placed personal belongings in a locker and pt has the cohen. I escorted pt to the sub-waiting area to wait for Lusi Alexandre COVEGA Lon. I offered pt a warm blanket and pt accepted. I notified [employer], Luis Alexandre, pt was ready and waiting in sub-waiting area.     Fawn Carrington, RN, BSN, CBCN  [facility]

## 2025-04-24 ENCOUNTER — TELEPHONE (OUTPATIENT)
Dept: MAMMOGRAPHY | Facility: CLINIC | Age: 35
End: 2025-04-24
Payer: COMMERCIAL

## 2025-04-24 LAB
PATH REPORT.COMMENTS IMP SPEC: NORMAL
PATH REPORT.FINAL DX SPEC: NORMAL
PATH REPORT.GROSS SPEC: NORMAL
PATH REPORT.MICROSCOPIC SPEC OTHER STN: NORMAL
PATH REPORT.RELEVANT HX SPEC: NORMAL
PHOTO IMAGE: NORMAL

## 2025-04-24 NOTE — TELEPHONE ENCOUNTER
Following Radiologist, Dr Izabela Grimaldo's review of 25, right breast biopsy pathology and imaging, and at Dr. Grimaldo's request, I telephoned patient, confirming pt identity using name and , to review the benign pathology results and Radiologist's recommendation for surgical consult for intraductal papilloma.    Pt verbalizes understanding of information provided during this call and acceptance of plan. I welcomed calls if any questions or concerns.     Pt is scheduled Th25, arriving at 0845am, with Dr Izaiah Torres, Breast surgeon at [facility]. Address and phone number given to pt.     Fawn Carrington, RN, BSN, [facility]  [facility]

## 2025-04-29 NOTE — PROGRESS NOTES
NEW SURGICAL CONSULTATION  May 1, 2025    Ashley Chauhan is a 35 year old woman who presents with a right  breast complaint.      Pertinent oncologic history:    Diagnostic Mammogram & Ultrasound (4/15/25) showed:    MAMMOGRAM FINDINGS: Bilateral  digital diagnostic mammograms were  performed  with computer aided detection. Digital breast tomosynthesis  was used in interpretation. A metallic BB marker overlies the superior  right breast, denoting site of palpable concern, with an associated  underlying partially circumscribed, partially obscured oval mass. No  suspicious masses, calcifications, or architectural distortion in the  left breast.     ULTRASOUND FINDINGS:  Targeted ultrasound of the colonic retroareolar  right breast in the area of clinical concern demonstrates a benign 1.8  x 1.4 x 2.8 cm simple cyst. There is a 5 x 4 x 5 mm oval hypoechoic  avascular mass at the 8:00 retroareolar breast, which may represent an  intraductal papilloma. Ultrasound-guided biopsy is recommended for  definitive tissue diagnosis.                                                                    IMPRESSION: BI-RADS CATEGORY: 4 - Suspicious.    (BIRADS 4A: Low suspicion for malignancy)     1. Indeterminate 5 mm mass at 8 o'clock retroareolar right breast,  possibly an intraductal papilloma, for which ultrasound-guided biopsy  is recommended. Should pathology be unrevealing, surgical consultation  is recommended for discussion of management options of bloody nipple  discharge.  2. Benign 2.8 cm simple cyst corresponds to the right breast lump.  3. No suspicious left breast findings.     Biopsy (4/23/25) showed:    BREAST, RIGHT, 8:00, RETROAREOLAR, ULTRASOUND-GUIDED CORE BIOPSIES:        1.  PROLIFERATIVE FIBROCYSTIC CHANGES WITH FIBROSIS, DUCT ECTASIA, INTRADUCTAL PAPILLOMA, USUAL              DUCTAL HYPERPLASIA AND SCLEROSING ADENOSIS       2.  ASSOCIATED MICROCALCIFICATIONS PRESENT       3.  NO EVIDENCE OF IN SITU OR  INVASIVE MALIGNANCY    HPI:    Ms. Chauhan noted a few months of both spontaneous and expressible right nipple discharge.  She describes this as varying between clearish and yellow and bloody.  Sometimes she notices this on her bra, sometimes she feels pressure and expresses this herself.  She also did notice a mass as noted above.  She has noticed no changes in this.  Denies any contralateral issues or any axillary adenopathy.    BREAST-SPECIFIC HISTORY:  Prior breast surgeries: No  Prior radiation history: No  Dominant hand: Ambi  HRT: Progesterone for chronic endometriosis, changing to mechelle  Menopause: Pre  Children: Two children    FAMILY HISTORY:  Breast ca: Yes, both grandmothers, on in 80s, one in 60s  Ovarian ca: No  Pancreatic ca: No  Melanoma: No  Gastric ca: No  Colon ca: No  Other cancer: No    Somewhat estranged, so unclear    Patient Active Problem List   Diagnosis    Asthma    Endometriosis    LGSIL of cervix of undetermined significance        Past Medical History:   Diagnosis Date    Chronic shoulder pain     Uncomplicated asthma        Past Surgical History:   Procedure Laterality Date    EYE SURGERY      GYN SURGERY      c-sections, laparoscopic cystectomy       Current Outpatient Medications   Medication Sig Dispense Refill    albuterol (PROAIR HFA/PROVENTIL HFA/VENTOLIN HFA) 108 (90 Base) MCG/ACT inhaler Inhale 2 puffs into the lungs every 4 hours as needed for shortness of breath / dyspnea or wheezing 1 Inhaler 0    ALBUTEROL IN Inhale into the lungs.      ALPRAZolam (XANAX) 0.5 MG tablet       docusate sodium (COLACE) 100 MG capsule Take 1 capsule (100 mg) by mouth 2 times daily as needed for constipation 30 capsule 0    EPINEPHrine (ANY BX GENERIC EQUIV) 0.3 MG/0.3ML injection 2-pack Inject 1 Pen into the muscle      esomeprazole (NEXIUM) 40 MG DR capsule TAKE 1 CAPSULE BY MOUTH TWICE DAILY 1 HOUR BEFORE MEALS      FLUoxetine (PROZAC) 20 MG capsule Take 20 mg by mouth      fluticasone  (FLOVENT DISKUS) 100 MCG/ACT inhaler Inhale 1 puff into the lungs      GALLIFREY 5 MG tablet Take 1 tablet by mouth daily at 2 pm.      LINZESS 72 MCG capsule TAKE 1 CAPSULE BY MOUTH EVERY DAY 30 MINUTES BEFORE FIRST MEAL OF THE DAY ON AN EMPTY STOMACH      Pediatric Multiple Vit-C-FA (FLINSTONES GUMMIES OMEGA-3 DHA) CHEW Take by mouth.      rizatriptan (MAXALT-MLT) 10 MG ODT DISSOLVE 1 TABLET IN MOUTH AS DIRECTED. MAY REPEAT AFTER TWO HOURS      topiramate (TOPAMAX) 25 MG tablet TAKE 25MG AT BEDTIME FOR 1 WEEK THEN INCREASE TO 25MG TWICE DAILY          Allergies   Allergen Reactions    Aspirin Anaphylaxis, Hives and Swelling    Bee Venom Anaphylaxis and Other (See Comments)    Sodium Hypochlorite Hives     Other Reaction(s): Throat Swelling/Closing    Inhalation, touch    Amoxicillin Nausea and Vomiting    Doxycycline Hives    Ibuprofen Itching    Lortab [Hydrocodone-Acetaminophen] Nausea and Vomiting    Mushroom Nausea and Vomiting    Mushroom Extract Complex (Obsolete) Nausea and Vomiting    Penicillin G Nausea and Vomiting    Pineapple Nausea and Vomiting        SOCIAL HISTORY:  Smokes: No, quit in november  Occupation: Care giver  Here alone    ROS:  Back pain: No  Headache: No  Abdominal pain: No  Unexpected weight loss: No  Easy bruising/bleeding: No  History of DVT/PE: No    Ht 1.524 m (5')   Wt 93.4 kg (206 lb)   LMP 03/26/2017 (Exact Date)   BMI 40.23 kg/m     Physical Exam   General: Awake, alert, NAD  Head/neck: NCAT, no cervical lymphadenopathy  Breast: Breasts symmetric, bilateral nipples everted, right breast with some firmness behind the nipple, nipple everted no Paget's changes, no discharge on my exam, left breast with no masses or skin changes, no axillary lymphadenopathy    ASSESSMENT:    Today we discussed the natural history, management, and treatment of both unilateral and this nipple discharge as well as intraductal papilloma.  Discussed that intraductal papilloma without atypia does have  a quite small risk of upgrade to preinvasive or invasive cancer.  That, in conjunction with her symptoms, however, I do recommend excision of the papilloma.  I cannot palpate this at this would be done by wire localization.  Discussed risks benefits and alternatives to right breast excisional biopsy.  These included but were not limited to bleeding, infection, contour change, cosmetic deformity, unexpected pathology requiring further surgery or therapy.  She expressed understanding and would like to proceed.  Will get this scheduled.    Izaiah Torres MD

## 2025-05-01 ENCOUNTER — OFFICE VISIT (OUTPATIENT)
Dept: SURGERY | Facility: CLINIC | Age: 35
End: 2025-05-01
Payer: COMMERCIAL

## 2025-05-01 VITALS — WEIGHT: 206 LBS | BODY MASS INDEX: 40.44 KG/M2 | HEIGHT: 60 IN

## 2025-05-01 DIAGNOSIS — D24.1 INTRADUCTAL PAPILLOMA OF RIGHT BREAST: Primary | ICD-10-CM

## 2025-05-01 PROCEDURE — G0463 HOSPITAL OUTPT CLINIC VISIT: HCPCS | Performed by: SURGERY

## 2025-05-01 NOTE — LETTER
5/1/2025      Ashley Chauhan  [address]  [place] [address]      Dear Colleague,    Thank you for referring your patient, Ashley Chauhan, to the [facility] [place]. Please see a copy of my visit note below.    NEW SURGICAL CONSULTATION  May 1, 2025    Ashley Chauhan is a 35 year old woman who presents with a right  breast complaint.      Pertinent oncologic history:    Diagnostic Mammogram & Ultrasound (4/15/25) showed:    MAMMOGRAM FINDINGS: Bilateral  digital diagnostic mammograms were  performed  with computer aided detection. Digital breast tomosynthesis  was used in interpretation. A metallic BB marker overlies the superior  right breast, denoting site of palpable concern, with an associated  underlying partially circumscribed, partially obscured oval mass. No  suspicious masses, calcifications, or architectural distortion in the  left breast.     ULTRASOUND FINDINGS:  Targeted ultrasound of the colonic retroareolar  right breast in the area of clinical concern demonstrates a benign 1.8  x 1.4 x 2.8 cm simple cyst. There is a 5 x 4 x 5 mm oval hypoechoic  avascular mass at the 8:00 retroareolar breast, which may represent an  intraductal papilloma. Ultrasound-guided biopsy is recommended for  definitive tissue diagnosis.                                                                    IMPRESSION: BI-RADS CATEGORY: 4 - Suspicious.    (BIRADS 4A: Low suspicion for malignancy)     1. Indeterminate 5 mm mass at 8 o'clock retroareolar right breast,  possibly an intraductal papilloma, for which ultrasound-guided biopsy  is recommended. Should pathology be unrevealing, surgical consultation  is recommended for discussion of management options of bloody nipple  discharge.  2. Benign 2.8 cm simple cyst corresponds to the right breast lump.  3. No suspicious left breast findings.     Biopsy (4/23/25) showed:    BREAST, RIGHT, 8:00, RETROAREOLAR,  ULTRASOUND-GUIDED CORE BIOPSIES:        1.  PROLIFERATIVE FIBROCYSTIC CHANGES WITH FIBROSIS, DUCT ECTASIA, INTRADUCTAL PAPILLOMA, USUAL              DUCTAL HYPERPLASIA AND SCLEROSING ADENOSIS       2.  ASSOCIATED MICROCALCIFICATIONS PRESENT       3.  NO EVIDENCE OF IN SITU OR INVASIVE MALIGNANCY    HPI:    Ms. Chauhan noted a few months of both spontaneous and expressible right nipple discharge.  She describes this as varying between clearish and yellow and bloody.  Sometimes she notices this on her bra, sometimes she feels pressure and expresses this herself.  She also did notice a mass as noted above.  She has noticed no changes in this.  Denies any contralateral issues or any axillary adenopathy.    BREAST-SPECIFIC HISTORY:  Prior breast surgeries: No  Prior radiation history: No  Dominant hand: Ambi  HRT: Progesterone for chronic endometriosis, changing to mechelle  Menopause: Pre  Children: Two children    FAMILY HISTORY:  Breast ca: Yes, both grandmothers, on in 80s, one in 60s  Ovarian ca: No  Pancreatic ca: No  Melanoma: No  Gastric ca: No  Colon ca: No  Other cancer: No    Somewhat estranged, so unclear    Patient Active Problem List   Diagnosis     Asthma     Endometriosis     LGSIL of cervix of undetermined significance        Past Medical History:   Diagnosis Date     Chronic shoulder pain      Uncomplicated asthma        Past Surgical History:   Procedure Laterality Date     EYE SURGERY       GYN SURGERY      c-sections, laparoscopic cystectomy       Current Outpatient Medications   Medication Sig Dispense Refill     albuterol (PROAIR HFA/PROVENTIL HFA/VENTOLIN HFA) 108 (90 Base) MCG/ACT inhaler Inhale 2 puffs into the lungs every 4 hours as needed for shortness of breath / dyspnea or wheezing 1 Inhaler 0     ALBUTEROL IN Inhale into the lungs.       ALPRAZolam (XANAX) 0.5 MG tablet        docusate sodium (COLACE) 100 MG capsule Take 1 capsule (100 mg) by mouth 2 times daily as needed for constipation  30 capsule 0     EPINEPHrine (ANY BX GENERIC EQUIV) 0.3 MG/0.3ML injection 2-pack Inject 1 Pen into the muscle       esomeprazole (NEXIUM) 40 MG DR capsule TAKE 1 CAPSULE BY MOUTH TWICE DAILY 1 HOUR BEFORE MEALS       FLUoxetine (PROZAC) 20 MG capsule Take 20 mg by mouth       fluticasone (FLOVENT DISKUS) 100 MCG/ACT inhaler Inhale 1 puff into the lungs       GALLIFREY 5 MG tablet Take 1 tablet by mouth daily at 2 pm.       LINZESS 72 MCG capsule TAKE 1 CAPSULE BY MOUTH EVERY DAY 30 MINUTES BEFORE FIRST MEAL OF THE DAY ON AN EMPTY STOMACH       Pediatric Multiple Vit-C-FA (FLINSTONES GUMMIES OMEGA-3 DHA) CHEW Take by mouth.       rizatriptan (MAXALT-MLT) 10 MG ODT DISSOLVE 1 TABLET IN MOUTH AS DIRECTED. MAY REPEAT AFTER TWO HOURS       topiramate (TOPAMAX) 25 MG tablet TAKE 25MG AT BEDTIME FOR 1 WEEK THEN INCREASE TO 25MG TWICE DAILY          Allergies   Allergen Reactions     Aspirin Anaphylaxis, Hives and Swelling     Bee Venom Anaphylaxis and Other (See Comments)     Sodium Hypochlorite Hives     Other Reaction(s): Throat Swelling/Closing    Inhalation, touch     Amoxicillin Nausea and Vomiting     Doxycycline Hives     Ibuprofen Itching     Lortab [Hydrocodone-Acetaminophen] Nausea and Vomiting     Mushroom Nausea and Vomiting     Mushroom Extract Complex (Obsolete) Nausea and Vomiting     Penicillin G Nausea and Vomiting     Pineapple Nausea and Vomiting        SOCIAL HISTORY:  Smokes: No, quit in november  Occupation: Care giver  Here alone    ROS:  Back pain: No  Headache: No  Abdominal pain: No  Unexpected weight loss: No  Easy bruising/bleeding: No  History of DVT/PE: No    Ht 1.524 m (5')   Wt 93.4 kg (206 lb)   LMP 03/26/2017 (Exact Date)   BMI 40.23 kg/m     Physical Exam   General: Awake, alert, NAD  Head/neck: NCAT, no cervical lymphadenopathy  Breast: Breasts symmetric, bilateral nipples everted, right breast with some firmness behind the nipple, nipple everted no Paget's changes, no discharge on  my exam, left breast with no masses or skin changes, no axillary lymphadenopathy    ASSESSMENT:    Today we discussed the natural history, management, and treatment of both unilateral and this nipple discharge as well as intraductal papilloma.  Discussed that intraductal papilloma without atypia does have a quite small risk of upgrade to preinvasive or invasive cancer.  That, in conjunction with her symptoms, however, I do recommend excision of the papilloma.  I cannot palpate this at this would be done by wire localization.  Discussed risks benefits and alternatives to right breast excisional biopsy.  These included but were not limited to bleeding, infection, contour change, cosmetic deformity, unexpected pathology requiring further surgery or therapy.  She expressed understanding and would like to proceed.  Will get this scheduled.    Izaiah Torres MD         Again, thank you for allowing me to participate in the care of your patient.        Sincerely,        Izaiah Torres MD    Electronically signed

## 2025-05-05 ENCOUNTER — TELEPHONE (OUTPATIENT)
Dept: SURGERY | Facility: CLINIC | Age: 35
End: 2025-05-05
Payer: COMMERCIAL

## 2025-05-05 PROBLEM — D24.1 INTRADUCTAL PAPILLOMA OF RIGHT BREAST: Status: ACTIVE | Noted: 2025-05-01

## 2025-05-05 NOTE — TELEPHONE ENCOUNTER
Spoke with patient today to schedule surgery as ordered by the provider.    WC/MVA Related?: No    Went over details/instructions as written on the letter.    Pre Op By: H&P by Primary MD  Post Op Scheduled : YES    Medications:  Blood Thinners? No  Weight Loss Meds? No  Diabetes Meds? No    Surgery Letter sent via [employer]      (Please see LETTERS TAB in chart to retrieve a copy of this letter)

## 2025-05-05 NOTE — LETTER
Pre-op Physical: Schedule a pre-op physical with your primary care doctor. The pre-op physical must be 10-30 days before surgery and since it is required by anesthesia, your surgery will be cancelled if it's not done.     Surgery Date: 6/4/2025     Location: [place] [address]    Approximate Arrival Time: 7:15 am  (Unless instructed differently by the pre-op call nurse)     Post op Appointment: 1 week post op appt to be scheduled in future.    Pre-Surgical Tasks:     Review all medications with your primary care or prescribing physician; they will advise you which meds to stop and when, and when you can resume taking.  Certain medications like blood thinners and weight loss medications need to be stopped in advance of surgery to proceed safely.      Blood thinners including but not exclusive to drugs like Xarelto, Eliquis, Warfarin and Aspirin, should be stopped five days before surgery, if your prescribing provider agrees. Follow your provider's advice on stopping blood thinners because they know you best.  If you are unsure if your medication is a blood thinner, ask your prescribing provider.    Weight loss medications: There are multiple medications being used for weight management and diabetes today, and the list is growing.  Phentermine, Ozempic, Wegovy, Trulicity, and other similar medications need to be stopped one week before surgery to avoid being cancelled.  Victoza and Saxenda can be continued longer but must be stopped one full day before surgery.  Please ask your prescribing provider for advice.    Diabetic medications: in addition to the medications talked about above that are used for either weight loss or diabetes, some people are on insulin that may require adjustment.  Please discuss managing diabetic medications with your prescribing doctor as these medications may require modification prior to surgery.     Please shower the evening  before and morning of surgery with Hibiclens soap.  This can be found at your local pharmacy.     Fasting instructions will be provided by the pre-op nurse who will call you 1-3 days before surgery.  Typically, we advise normal food up to 8 hours before you arrive for surgery. Clear liquids only from then until 2 hours before you arrive surgery, then nothing at all by mouth.  The nurse will review your specific instructions with you at the call.      Smoking impacts your body's ability to heal properly so we advise patients to quit if possible before surgery.  Plastic Surgery patients are required to be nicotine free for at least 8 weeks before surgery.      You will need an adult to drive you home and stay with you 24 hours after surgery. Public transportation or Medical Van Services are not permitted.    Visitor restrictions are subject to change, please verify with the pre-op nurse when they call how many people are permitted to accompany you.    We always encourage you to notify your insurance any time you have medical tests or procedures scheduled including surgery. The number is usually right on the back of your insurance card. To obtain pricing for surgery, please call  [employer] [facility] of Care at [phone] or email [email or web address].        Call our office if you have any questions! Thank you!     Shun Bustillo  Complex   [facility]  [phone]    Electronically signed

## 2025-05-14 NOTE — H&P (VIEW-ONLY)
[facility]      Preoperative Consultation   Ashley Chauhan   : 1990   Gender: female    Date of Encounter: 2025    Nursing Notes:   Andreina Sanchez MA  2025  8:02 AM  Signed  Chief Complaint   Patient presents with     Pre-Op Exam       Additional visit information (chief complaint/health maintenance) shared by patient:   Health Maintenance Due   Topic Date Due     HIV for age 15-65  Never done     Hepatitis C screening for age 18-79  Never done     Health maintenance reviewed with patient     Ashley Chauhan is a 35 y.o. female (1990) who presents for preop evaluation undergoing lumpectomy.      Date of Surgery: 25  Surgical Specialty: Dr. Torres  [facility]  Fax number: [phone]  Surgery type: outpatient  Primary Physician: Clin, [facility]      Patient presents for an in-person office visit: alone  Communication Method: Patient is active on [employer] and has been instructed that results/communications will be made via [employer]  If a phone call is needed, the preferred number is: Mobile   Home Phone [phone]   Mobile [phone]     May we leave a detailed message at this number? Yes    Andreina Sanchez MA ....................  2025   8:02 AM           History of Present Illness     History of Present Illness  The patient is a 35-year-old female who presents for a preoperative evaluation.    She was referred to her OB/GYN due to the presence of a breast mass, accompanied by bloody and colorful discharge. A mammogram and ultrasound confirmed the existence of the mass, as well as an additional mass within her duct. A lumpectomy has been scheduled to remove the ductal mass and surrounding tissue. A biopsy of the internal mass revealed benign characteristics. However, due to associated symptoms such as sharp pain radiating from her nipple and discharge, further tissue removal is planned. She reports overall good health  today.      FAMILY HISTORY  She reports no family history of problems with anesthesia.           Review of Systems   A comprehensive review of systems was negative except for items noted in HPI.    Patient Active Problem List   Diagnosis Code     Asthma (HC) J45.909     Endometriosis N80.9     LGSIL of cervix of undetermined significance R87.612     Subcutaneous mass R22.9     Acute viral conjunctivitis of left eye B30.9     Intraductal papilloma of right breast D24.1     Current Outpatient Medications   Medication Sig     albuterol HFA (PRO-AIR; VENTOLIN; PROVENTIL) 90 mcg/actuation inhaler Inhale 2 Puffs by mouth 4 times daily if needed for Shortness Of Breath.     benzonatate (TESSALON) 200 mg capsule Take 1 Capsule (200 mg) by mouth 3 times daily if needed for Cough.     EPINEPHrine (EPIPEN) 0.3 mg/0.3 mL auto-injector Inject 0.3 mg (1 pen) intramuscular each time if needed for Allergic Reaction.     esomeprazole delayed release capsule (NEXIUM) 40 mg Take 40 mg by mouth.     FLINTSTONES MULTIVITAMIN ORAL Take  by mouth.     fluticasone propionate (FLOVENT DISKUS) 100 mcg/actuation inhaler Inhale 1 Puff by mouth two times daily.     Linzess 145 mcg cap capsule TAKE 1 CAPSULE BY MOUTH EVERY DAY 30 MINUTES BEFORE FIRST MEAL OF THE DAY ON AN EMPTY STOMACH     progesterone 50 mg/mL injection Inject intramuscular.     No current facility-administered medications for this visit.     Medications have been reviewed by me and are current to the best of my knowledge and ability.     Allergies   Allergen Reactions     Bleach [Sodium Hypochlorite Solution] Hives and Throat Swelling/Closing     Inhalation, touch     Venom-Honey Bee Anaphylaxis     Adhesive Tape-Silicones Rash     Amoxicillin Hives     Aspirin Hives and Edema     Doxycycline Hives     Ibuprofen Itching     Lortab [Hydrocodone-Acetaminophen] Vomiting     Mushroom Nausea And Vomiting     Penicillins Hives     Pineapple Vomiting     Past Surgical History:   .  Laterality Date      SECTION  3/19/2013, 4/15/15     eye muscle surgery       laparoscopy with ovarian cyst removal Left      MASS EXCISION  10/10/2019    RLQ abdominal wall - Dr. Obando      right great toe  surgery       Robotic assisted hysterectomy, bilateral salpingectomy, removal of Mirena IUD  2019    Dr. Diaz     TIBIA FRACTURE SURGERY Right      Social History     Tobacco Use     Smoking status: Former     Current packs/day: 0.00     Average packs/day: 0.3 packs/day for 30.2 years (7.6 ttl pk-yrs)     Types: Cigarettes     Start date: 1994     Quit date: 2024     Years since quittin.4     Smokeless tobacco: Never     Tobacco comments:     3 cigs per day or less 2022   Vaping Use     Vaping status: Never Used   Substance Use Topics     Alcohol use: Not Currently     Drug use: Yes     Types: Marijuana     Comment: occ     Family History   Problem Relation Age of Onset     Heart Disease Mother 45     Hypertension Mother      Diabetes Mother      Arthritis Mother      Psychiatric illness Father         Bipolar disorder      Osteoarthritis Father      Cancer Sister 18        Ovarian     Cancer Sister 18        Uterine      Other Other         No FH of breast, colon cancer     Results  Imaging  Mammogram and ultrasound showed a mass in the breast and a mass inside the duct.    Testing  Biopsy of the mass inside the duct was benign.    PAST DIFFICULTY WITH ANESTHESIA: None     Physical Exam   /74 (Cuff Site: Right Arm, Position: Sitting, Cuff Size: Adult Regular)   Pulse 99   Ht 1.524 m (5')   Wt 91.2 kg (201 lb 1.6 oz)   LMP 2017   SpO2 99%   BMI 39.27 kg/m   Body mass index is 39.27 kg/m .  Physical Exam    General Appearance: Pleasant, alert, appropriate appearance for age. No acute distress  Head Exam: Normal. Normocephalic, atraumatic.  Eye Exam: Normal external eye, conjunctiva, lids, cornea. MEI.  Ear Exam: Normal TM's bilaterally. Normal  auditory canals and external ears. Non-tender.  Nose Exam: Normal external nose, mucous membranes, and septum.  OroPharynx Exam: Dental hygiene adequate. Normal buccal mucosa. Normal pharynx.  Neck Exam: Supple, no masses or nodes.  Chest/Respiratory Exam: Normal chest wall and respirations. Clear to auscultation.  Cardiovascular Exam: Regular rate and rhythm. S1, S2, no murmur, click, gallop, or rubs.  Gastrointestinal Exam: Soft, tender to lower abdomen, no abnormal masses or organomegaly.  Skin: no rash or abnormalities  Neurologic Exam: Normal.  Psychiatric Exam: Alert and oriented, appropriate affect.     Assessment / Plan     The Pre-Op Tool    Recommendations      Low Risk Procedure    Cardiac History  No history of coronary artery disease           Labs  No routine labs indicated  EKG  Not indicated  Stress Testing  Not indicated    * Testing recommendations are intended to assist, but not direct, clinical decisions.           Take your other medications as usual prior to the procedure  Hold vitamins and/or supplements for 1 week prior to the procedure  Okay to take Acetaminophen (Tylenol) up until the procedure  Hold / avoid NSAIDs (e.g. ibuprofen, naproxen) prior to procedure: 2 days for ibuprofen (Advil) and 4 days for naproxen (Aleve).    * Medication recommendations are not intended to be exhaustive; they are limited to common medications that are potentially dangerous if incorrectly managed          Labs  * Data supports elimination of  routine  laboratory testing in favor of focused,  indicated  testing based on medical co-morbidities. A 2009 study randomized 1061 patients undergoing ambulatory, non-cataract surgery to routine or to indicated testing. Perioperative adverse events were similar (Anesthesia & Analgesia 2009;108:467-75; Anesthesiol. Clin. 2016 Mar;34(1):43-58).  EKG  * The ACC/AHA recommends against obtaining routine EKGs in patients undergoing low risk surgeries, a class IIa  recommendation ([facility]. 2014;64(21);e1-76).     Session ID: [id number]  Endnotes and bibliography available upon request: [email or web address]  Labs: no  ECG: no    ICD-10-CM    1. Pre-op evaluation  Z01.818       2. Intraductal papilloma of right breast  D24.1       3. Mild intermittent asthma without complication (HC)  J45.20         Assessment & Plan  1. Preoperative evaluation.  She is scheduled for a lumpectomy to remove a benign mass inside the duct, along with surrounding tissue. She reports no significant issues with anesthesia in the past, except for two instances related to anxiety. She has no history of diabetes, blood disorders, TIA, stroke, liver disease, kidney disease, high blood pressure (except during pregnancy), sleep apnea, heart problems, or use of blood pressure medications, diabetes medications, weight loss medications, blood thinning medications, or pain medications other than Tylenol.  She is cleared for surgery. She can continue her regular medications but should avoid vitamins and supplements 1 week prior to surgery. Tylenol is permissible until the day of the procedure. She should abstain from ibuprofen 2 days before and Aleve 4 days before the surgery. These instructions will be included in her after-visit summary.      2. Asthma.  Her asthma is well-controlled with no recent exacerbations since changing her work environment.  Patient is cleared for planned procedure.   Electronically Signed by:   Melinda Newby NP ....................  5/14/2025   8:50 AM    5/14/2025

## 2025-06-03 ENCOUNTER — ANESTHESIA EVENT (OUTPATIENT)
Dept: SURGERY | Facility: AMBULATORY SURGERY CENTER | Age: 35
End: 2025-06-03
Payer: COMMERCIAL

## 2025-06-04 ENCOUNTER — HOSPITAL ENCOUNTER (OUTPATIENT)
Facility: AMBULATORY SURGERY CENTER | Age: 35
Discharge: HOME | End: 2025-06-04
Attending: SURGERY
Payer: COMMERCIAL

## 2025-06-04 ENCOUNTER — ANCILLARY PROCEDURE (OUTPATIENT)
Dept: MAMMOGRAPHY | Facility: CLINIC | Age: 35
End: 2025-06-04
Attending: SURGERY
Payer: COMMERCIAL

## 2025-06-04 ENCOUNTER — ANESTHESIA (OUTPATIENT)
Dept: SURGERY | Facility: AMBULATORY SURGERY CENTER | Age: 35
End: 2025-06-04
Payer: COMMERCIAL

## 2025-06-04 VITALS
SYSTOLIC BLOOD PRESSURE: 103 MMHG | DIASTOLIC BLOOD PRESSURE: 65 MMHG | TEMPERATURE: 97.2 F | HEIGHT: 60 IN | WEIGHT: 202.9 LBS | OXYGEN SATURATION: 100 % | BODY MASS INDEX: 39.83 KG/M2 | RESPIRATION RATE: 16 BRPM | HEART RATE: 87 BPM

## 2025-06-04 DIAGNOSIS — D24.1 INTRADUCTAL PAPILLOMA OF RIGHT BREAST: ICD-10-CM

## 2025-06-04 PROCEDURE — 76098 X-RAY EXAM SURGICAL SPECIMEN: CPT

## 2025-06-04 PROCEDURE — 999N000065 MA POST PROCEDURE RIGHT

## 2025-06-04 PROCEDURE — 250N000009 HC RX 250: Performed by: SURGERY

## 2025-06-04 PROCEDURE — 19285 PERQ DEV BREAST 1ST US IMAG: CPT | Mod: RT

## 2025-06-04 RX ORDER — SODIUM CHLORIDE, SODIUM LACTATE, POTASSIUM CHLORIDE, CALCIUM CHLORIDE 600; 310; 30; 20 MG/100ML; MG/100ML; MG/100ML; MG/100ML
INJECTION, SOLUTION INTRAVENOUS CONTINUOUS
Status: DISCONTINUED | OUTPATIENT
Start: 2025-06-04 | End: 2025-06-05 | Stop reason: HOSPADM

## 2025-06-04 RX ORDER — ONDANSETRON 2 MG/ML
4 INJECTION INTRAMUSCULAR; INTRAVENOUS EVERY 30 MIN PRN
Status: DISCONTINUED | OUTPATIENT
Start: 2025-06-04 | End: 2025-06-05 | Stop reason: HOSPADM

## 2025-06-04 RX ORDER — NALOXONE HYDROCHLORIDE 0.4 MG/ML
0.1 INJECTION, SOLUTION INTRAMUSCULAR; INTRAVENOUS; SUBCUTANEOUS
Status: DISCONTINUED | OUTPATIENT
Start: 2025-06-04 | End: 2025-06-05 | Stop reason: HOSPADM

## 2025-06-04 RX ORDER — ACETAMINOPHEN 325 MG/1
650 TABLET ORAL
Status: DISCONTINUED | OUTPATIENT
Start: 2025-06-04 | End: 2025-06-05 | Stop reason: HOSPADM

## 2025-06-04 RX ORDER — CEFAZOLIN SODIUM 2 G/100ML
2 INJECTION, SOLUTION INTRAVENOUS SEE ADMIN INSTRUCTIONS
Status: DISCONTINUED | OUTPATIENT
Start: 2025-06-04 | End: 2025-06-05 | Stop reason: HOSPADM

## 2025-06-04 RX ORDER — BUPIVACAINE HYDROCHLORIDE 2.5 MG/ML
INJECTION, SOLUTION INFILTRATION; PERINEURAL PRN
Status: DISCONTINUED | OUTPATIENT
Start: 2025-06-04 | End: 2025-06-04 | Stop reason: HOSPADM

## 2025-06-04 RX ORDER — FENTANYL CITRATE 0.05 MG/ML
25 INJECTION, SOLUTION INTRAMUSCULAR; INTRAVENOUS
Status: DISCONTINUED | OUTPATIENT
Start: 2025-06-04 | End: 2025-06-05 | Stop reason: HOSPADM

## 2025-06-04 RX ORDER — ONDANSETRON 2 MG/ML
INJECTION INTRAMUSCULAR; INTRAVENOUS PRN
Status: DISCONTINUED | OUTPATIENT
Start: 2025-06-04 | End: 2025-06-04

## 2025-06-04 RX ORDER — LIDOCAINE HYDROCHLORIDE 10 MG/ML
INJECTION, SOLUTION INFILTRATION; PERINEURAL PRN
Status: DISCONTINUED | OUTPATIENT
Start: 2025-06-04 | End: 2025-06-04

## 2025-06-04 RX ORDER — HYDROMORPHONE HCL IN WATER/PF 6 MG/30 ML
0.2 PATIENT CONTROLLED ANALGESIA SYRINGE INTRAVENOUS EVERY 5 MIN PRN
Status: DISCONTINUED | OUTPATIENT
Start: 2025-06-04 | End: 2025-06-05 | Stop reason: HOSPADM

## 2025-06-04 RX ORDER — ACETAMINOPHEN 325 MG/1
975 TABLET ORAL ONCE
Status: COMPLETED | OUTPATIENT
Start: 2025-06-04 | End: 2025-06-04

## 2025-06-04 RX ORDER — OXYCODONE HYDROCHLORIDE 5 MG/1
5 TABLET ORAL
Status: DISCONTINUED | OUTPATIENT
Start: 2025-06-04 | End: 2025-06-05 | Stop reason: HOSPADM

## 2025-06-04 RX ORDER — MEPERIDINE HYDROCHLORIDE 25 MG/ML
12.5 INJECTION INTRAMUSCULAR; INTRAVENOUS; SUBCUTANEOUS EVERY 5 MIN PRN
Status: DISCONTINUED | OUTPATIENT
Start: 2025-06-04 | End: 2025-06-05 | Stop reason: HOSPADM

## 2025-06-04 RX ORDER — OXYCODONE HYDROCHLORIDE 10 MG/1
10 TABLET ORAL
Status: DISCONTINUED | OUTPATIENT
Start: 2025-06-04 | End: 2025-06-05 | Stop reason: HOSPADM

## 2025-06-04 RX ORDER — OXYCODONE HYDROCHLORIDE 5 MG/1
5-10 TABLET ORAL EVERY 6 HOURS PRN
Qty: 8 TABLET | Refills: 0 | Status: SHIPPED | OUTPATIENT
Start: 2025-06-04

## 2025-06-04 RX ORDER — FENTANYL CITRATE 0.05 MG/ML
50 INJECTION, SOLUTION INTRAMUSCULAR; INTRAVENOUS EVERY 5 MIN PRN
Status: DISCONTINUED | OUTPATIENT
Start: 2025-06-04 | End: 2025-06-05 | Stop reason: HOSPADM

## 2025-06-04 RX ORDER — CEFAZOLIN SODIUM 2 G/100ML
2 INJECTION, SOLUTION INTRAVENOUS
Status: DISCONTINUED | OUTPATIENT
Start: 2025-06-04 | End: 2025-06-05 | Stop reason: HOSPADM

## 2025-06-04 RX ORDER — DEXAMETHASONE SODIUM PHOSPHATE 4 MG/ML
4 INJECTION, SOLUTION INTRA-ARTICULAR; INTRALESIONAL; INTRAMUSCULAR; INTRAVENOUS; SOFT TISSUE
Status: DISCONTINUED | OUTPATIENT
Start: 2025-06-04 | End: 2025-06-05 | Stop reason: HOSPADM

## 2025-06-04 RX ORDER — LABETALOL 20 MG/4 ML (5 MG/ML) INTRAVENOUS SYRINGE
5 EVERY 5 MIN PRN
Status: DISCONTINUED | OUTPATIENT
Start: 2025-06-04 | End: 2025-06-05 | Stop reason: HOSPADM

## 2025-06-04 RX ORDER — GLYCOPYRROLATE 0.2 MG/ML
INJECTION, SOLUTION INTRAMUSCULAR; INTRAVENOUS PRN
Status: DISCONTINUED | OUTPATIENT
Start: 2025-06-04 | End: 2025-06-04

## 2025-06-04 RX ORDER — CLINDAMYCIN PHOSPHATE 900 MG/50ML
INJECTION, SOLUTION INTRAVENOUS PRN
Status: DISCONTINUED | OUTPATIENT
Start: 2025-06-04 | End: 2025-06-04

## 2025-06-04 RX ORDER — ONDANSETRON 4 MG/1
4 TABLET, ORALLY DISINTEGRATING ORAL EVERY 30 MIN PRN
Status: DISCONTINUED | OUTPATIENT
Start: 2025-06-04 | End: 2025-06-05 | Stop reason: HOSPADM

## 2025-06-04 RX ORDER — FENTANYL CITRATE 0.05 MG/ML
25 INJECTION, SOLUTION INTRAMUSCULAR; INTRAVENOUS EVERY 5 MIN PRN
Status: DISCONTINUED | OUTPATIENT
Start: 2025-06-04 | End: 2025-06-05 | Stop reason: HOSPADM

## 2025-06-04 RX ORDER — DEXAMETHASONE SODIUM PHOSPHATE 4 MG/ML
INJECTION, SOLUTION INTRA-ARTICULAR; INTRALESIONAL; INTRAMUSCULAR; INTRAVENOUS; SOFT TISSUE PRN
Status: DISCONTINUED | OUTPATIENT
Start: 2025-06-04 | End: 2025-06-04

## 2025-06-04 RX ORDER — PROPOFOL 10 MG/ML
INJECTION, EMULSION INTRAVENOUS CONTINUOUS PRN
Status: DISCONTINUED | OUTPATIENT
Start: 2025-06-04 | End: 2025-06-04

## 2025-06-04 RX ORDER — ALBUTEROL SULFATE 0.83 MG/ML
2.5 SOLUTION RESPIRATORY (INHALATION) EVERY 4 HOURS PRN
Status: DISCONTINUED | OUTPATIENT
Start: 2025-06-04 | End: 2025-06-05 | Stop reason: HOSPADM

## 2025-06-04 RX ORDER — FENTANYL CITRATE 50 UG/ML
INJECTION, SOLUTION INTRAMUSCULAR; INTRAVENOUS PRN
Status: DISCONTINUED | OUTPATIENT
Start: 2025-06-04 | End: 2025-06-04

## 2025-06-04 RX ORDER — HYDROMORPHONE HCL IN WATER/PF 6 MG/30 ML
0.4 PATIENT CONTROLLED ANALGESIA SYRINGE INTRAVENOUS EVERY 5 MIN PRN
Status: DISCONTINUED | OUTPATIENT
Start: 2025-06-04 | End: 2025-06-05 | Stop reason: HOSPADM

## 2025-06-04 RX ORDER — LIDOCAINE 40 MG/G
CREAM TOPICAL
Status: DISCONTINUED | OUTPATIENT
Start: 2025-06-04 | End: 2025-06-05 | Stop reason: HOSPADM

## 2025-06-04 RX ADMIN — FENTANYL CITRATE 50 MCG: 50 INJECTION, SOLUTION INTRAMUSCULAR; INTRAVENOUS at 08:33

## 2025-06-04 RX ADMIN — FENTANYL CITRATE 50 MCG: 50 INJECTION, SOLUTION INTRAMUSCULAR; INTRAVENOUS at 08:26

## 2025-06-04 RX ADMIN — DEXAMETHASONE SODIUM PHOSPHATE 8 MG: 4 INJECTION, SOLUTION INTRA-ARTICULAR; INTRALESIONAL; INTRAMUSCULAR; INTRAVENOUS; SOFT TISSUE at 08:27

## 2025-06-04 RX ADMIN — CLINDAMYCIN PHOSPHATE 900 MG: 900 INJECTION, SOLUTION INTRAVENOUS at 08:19

## 2025-06-04 RX ADMIN — LIDOCAINE HYDROCHLORIDE 30 MG: 10 INJECTION, SOLUTION INFILTRATION; PERINEURAL at 08:27

## 2025-06-04 RX ADMIN — PROPOFOL 200 MCG/KG/MIN: 10 INJECTION, EMULSION INTRAVENOUS at 08:19

## 2025-06-04 RX ADMIN — ONDANSETRON 4 MG: 2 INJECTION INTRAMUSCULAR; INTRAVENOUS at 08:33

## 2025-06-04 RX ADMIN — SODIUM CHLORIDE, SODIUM LACTATE, POTASSIUM CHLORIDE, CALCIUM CHLORIDE: 600; 310; 30; 20 INJECTION, SOLUTION INTRAVENOUS at 08:10

## 2025-06-04 RX ADMIN — ACETAMINOPHEN 975 MG: 325 TABLET ORAL at 07:04

## 2025-06-04 RX ADMIN — Medication 100 MCG: at 08:51

## 2025-06-04 RX ADMIN — GLYCOPYRROLATE 0.2 MG: 0.2 INJECTION, SOLUTION INTRAMUSCULAR; INTRAVENOUS at 08:27

## 2025-06-04 RX ADMIN — LIDOCAINE HYDROCHLORIDE 10 ML: 10 INJECTION, SOLUTION INFILTRATION; PERINEURAL at 07:48

## 2025-06-04 RX ADMIN — Medication 100 MCG: at 08:57

## 2025-06-04 NOTE — ANESTHESIA PREPROCEDURE EVALUATION
Anesthesia Pre-Procedure Evaluation    Patient: Ashley Chauhan   MRN: [id number] : 1990          Procedure : Procedure(s):  RIGHT BREAST LUMPECTOMY, WIRE LOCALIZED         Past Medical History:   Diagnosis Date    Anemia     Chronic shoulder pain     Gastroesophageal reflux disease     Motion sickness     Obese     Uncomplicated asthma       Past Surgical History:   Procedure Laterality Date    EYE SURGERY      GYN SURGERY      c-sections, laparoscopic cystectomy    HYSTERECTOMY  2016      Allergies   Allergen Reactions    Aspirin Anaphylaxis, Hives and Swelling    Bee Venom Anaphylaxis and Other (See Comments)    Sodium Hypochlorite Hives     Other Reaction(s): Throat Swelling/Closing    Inhalation, touch    Amoxicillin Nausea and Vomiting    Doxycycline Hives    Ibuprofen Itching    Lortab [Hydrocodone-Acetaminophen] Nausea and Vomiting    Mushroom Nausea and Vomiting    Mushroom Extract Complex (Obsolete) Nausea and Vomiting    Penicillin G Nausea and Vomiting    Pineapple Nausea and Vomiting      Social History     Tobacco Use    Smoking status: Former     Current packs/day: 0.25     Average packs/day: 0.3 packs/day for 10.0 years (2.5 ttl pk-yrs)     Types: Cigarettes    Smokeless tobacco: Never   Substance Use Topics    Alcohol use: Yes     Comment: 1-2 drinks/week on average      Wt Readings from Last 1 Encounters:   25 92 kg (202 lb 14.4 oz)        Anesthesia Evaluation            ROS/MED HX  ENT/Pulmonary:     (+)                      asthma                  Neurologic:  - neg neurologic ROS     Cardiovascular:  - neg cardiovascular ROS     METS/Exercise Tolerance:     Hematologic:       Musculoskeletal:       GI/Hepatic:     (+) GERD,                   Renal/Genitourinary:       Endo:     (+)               Obesity,       Psychiatric/Substance Use:       Infectious Disease:       Malignancy:   (+) Malignancy, History of Breast.    Other:              Physical Exam  Airway  Mallampati:  "II  TM distance: >3 FB  Neck ROM: full    Cardiovascular - normal exam   Dental   (+) Completely normal teeth      Pulmonary - normal exam      Neurological - normal exam  She appears awake, alert and oriented x3.    Other Findings       OUTSIDE LABS:  CBC:   Lab Results   Component Value Date    WBC 9.0 07/01/2024    WBC 7.2 03/11/2023    HGB 14.8 07/01/2024    HGB 14.2 03/11/2023    HCT 43.0 07/01/2024    HCT 41.3 03/11/2023     07/01/2024     03/11/2023     BMP:   Lab Results   Component Value Date     07/01/2024     03/11/2023    POTASSIUM 4.4 07/01/2024    POTASSIUM 3.8 03/11/2023    CHLORIDE 105 07/01/2024    CHLORIDE 103 03/11/2023    CO2 26 07/01/2024    CO2 23 03/11/2023    BUN 9.9 07/01/2024    BUN 8.3 03/11/2023    CR 0.71 07/01/2024    CR 0.65 03/11/2023    GLC 84 07/01/2024    GLC 91 03/11/2023     COAGS: No results found for: \"PTT\", \"INR\", \"FIBR\"  POC:   Lab Results   Component Value Date    HCG Negative 03/25/2019    HCGS Negative 07/01/2024     HEPATIC:   Lab Results   Component Value Date    ALBUMIN 4.1 07/01/2024    PROTTOTAL 6.7 07/01/2024    ALT 20 07/01/2024    AST 16 07/01/2024    ALKPHOS 76 07/01/2024    BILITOTAL 0.2 07/01/2024     OTHER:   Lab Results   Component Value Date    LACT 1.1 07/18/2018    LORRAINE 9.2 07/01/2024    LIPASE 21 03/11/2023    AMYLASE 41 11/08/2015       Anesthesia Plan    ASA Status:  3      NPO Status: NPO Appropriate   Anesthesia Type: MAC.  Induction: intravenous.   Techniques and Equipment:       - Monitoring Plan: standard ASA monitoring     Consents    Anesthesia Plan(s) and associated risks, benefits, and realistic alternatives discussed. Questions answered and patient/representative(s) expressed understanding.     - Discussed:     - Discussed with:  Patient               Postoperative Care         Comments:    Other Comments: Discussed risks and benefits of MAC including remembering portions of the case and possible need to convert to " general anesthesia if intolerant of procedure or respiratory compromise.                  Gilbert Gómez MD    I have reviewed the pertinent notes and labs in the chart from the past 30 days and (re)examined the patient.  Any updates or changes from those notes are reflected in this note.    Clinically Significant Risk Factors Present on Admission                             # Obesity: Estimated body mass index is 39.63 kg/m  as calculated from the following:    Height as of this encounter: 1.524 m (5').    Weight as of this encounter: 92 kg (202 lb 14.4 oz).       # Asthma: noted on problem list

## 2025-06-04 NOTE — DISCHARGE INSTRUCTIONS
You have received 975 mg of Acetaminophen (Tylenol) at 7:04. Please do not take an additional dose of Tylenol until after 3:04 pm     Do not exceed 4,000 mg of acetaminophen during a 24 hour period and keep in mind that acetaminophen can also be found in many over-the-counter cold medications as well as narcotics that may be given for pain.     If you have any questions or concerns regarding your procedure please contact Dr. Torres, his office number is [phone]     Adult Discharge Orders & Instructions     For 24 hours after surgery    Get plenty of rest.  A responsible adult must stay with you for at least 24 hours after you leave the hospital.   Do not drive or use heavy equipment.  If you have weakness or tingling, don't drive or use heavy equipment until this feeling goes away.  Do not drink alcohol.  Avoid strenuous or risky activities.  Ask for help when climbing stairs.   You may feel lightheaded.  IF so, sit for a few minutes before standing.  Have someone help you get up.   If you have nausea (feel sick to your stomach): Drink only clear liquids such as apple juice, ginger ale, broth or 7-Up.  Rest may also help.  Be sure to drink enough fluids.  Move to a regular diet as you feel able.  You may have a slight fever. Call the doctor if your fever is over 100 F (37.7 C) (taken under the tongue) or lasts longer than 24 hours.  You may have a dry mouth, a sore throat, muscle aches or trouble sleeping.  These should go away after 24 hours.  Do not make important or legal decisions.     Call your doctor for any of the followin.  Signs of infection (fever, growing tenderness at the surgery site, a large amount of drainage or bleeding, severe pain, foul-smelling drainage, redness, swelling).    2. It has been over 8 to 10 hours since surgery and you are still not able to urinate (pass water).    3.  Headache for over 24 hours.

## 2025-06-04 NOTE — INTERVAL H&P NOTE
I have reviewed the surgical (or preoperative) H&P that is linked to this encounter, and examined the patient. There are no significant changes    Plan right-sided lumpectomy.  Again discussed risk, benefits, alternatives including but not limited to bleeding, infection, need for further surgery and/or therapy, contour change of the breast.  Risks and benefits of surgery explained and they wish to proceed.    Clinical Conditions Present on Arrival:  Clinically Significant Risk Factors Present on Admission                       # Obesity: Estimated body mass index is 39.63 kg/m  as calculated from the following:    Height as of this encounter: 1.524 m (5').    Weight as of this encounter: 92 kg (202 lb 14.4 oz).

## 2025-06-04 NOTE — ANESTHESIA CARE TRANSFER NOTE
Patient: Ashley Chauhan    Procedure: Procedure(s):  RIGHT BREAST LUMPECTOMY, WIRE LOCALIZED       Diagnosis: Intraductal papilloma of right breast [D24.1]  Diagnosis Additional Information: No value filed.    Anesthesia Type:   MAC     Note:    Oropharynx: oropharynx clear of all foreign objects  Level of Consciousness: drowsy  Oxygen Supplementation: face mask    Independent Airway: airway patency satisfactory and stable  Dentition: dentition unchanged      Patient transferred to: Phase II    Handoff Report: Identifed the Patient, Identified the Reponsible Provider, Reviewed the pertinent medical history, Discussed the surgical course, Reviewed Intra-OP anesthesia mangement and issues during anesthesia, Set expectations for post-procedure period and Allowed opportunity for questions and acknowledgement of understanding      Vitals:  Vitals Value Taken Time   BP     Temp     Pulse     Resp     SpO2         Electronically Signed By: Gilbert Gómez MD  June 4, 2025  9:06 AM

## 2025-06-04 NOTE — OP NOTE
Name:  Ashley Chauhan  PCP:  Melinda Doan  Procedure Date:  6/4/2025      Procedure(s):  RIGHT BREAST LUMPECTOMY, WIRE LOCALIZED       Pre-Procedure Diagnosis:  Intraductal papilloma of right breast [D24.1]     Post-Procedure Diagnosis:    Same    Anesthesia Type:    MAC with local    Estimated Blood Loss:   5cc    Specimens:    Right breast lumpectomy    Complications:    None    Indication for Procedure:  Ms. Chauhan is a 35 year old female with history of right breast spontaneous nipple discharge associate with a papilloma found on biopsy.  After discussion of risks/benefits/alternatives she elected to proceed with right breast lumpectomy.    Operative Report:    After informed consent was obtained, and the risks and benefits of the procedure were discussed, the patient was brought back to the operative suite and placed in the supine position.  Preoperatively, a localization wire was placed by radiology.  MAC anesthesia was provided by the department of anesthesia.  The right breast was prepped and draped in the usual sterile fashion.  After infiltration with quarter percent Marcaine, a periareolar shaped incision was made over the lesion in the breast at 8:00.  This was carried down through the subcutaneous tissues and then around the mass widely with electrocautery.  Once removed, the specimen was marked for orientation and sent to radiology, which demonstrated the clip and area of concern.  The specimen was then sent to pathology for permanent sectioning.  The wound was inspected for hemostasis and closed with 3-0 Vicryl to the subcutaneous tissues followed by a subcuticular stitch of 4-0 Monocryl to the skin.  Dermabond was then placed over the incision.    Instrument, sponge, and needle counts were correct at closure and at the conclusion of the case.     Disposition:  The patient tolerated the procedure well.  They were transferred to the postanesthesia care unit in stable  condition.    Izaiah Torres MD

## 2025-06-04 NOTE — ANESTHESIA POSTPROCEDURE EVALUATION
Patient: Ashley TEIXEIRA Kane    Procedure: Procedure(s):  RIGHT BREAST LUMPECTOMY, WIRE LOCALIZED       Anesthesia Type:  MAC    Note:  Disposition: Outpatient   Postop Pain Control: Uneventful            Sign Out: Well controlled pain   PONV: No   Neuro/Psych: Uneventful            Sign Out: Acceptable/Baseline neuro status   Airway/Respiratory: Uneventful            Sign Out: Acceptable/Baseline resp. status   CV/Hemodynamics: Uneventful            Sign Out: Acceptable CV status   Other NRE:    DID A NON-ROUTINE EVENT OCCUR?            Last vitals:  Vitals Value Taken Time   /65 06/04/25 09:54   Temp 97.2  F (36.2  C) 06/04/25 09:05   Pulse 71 06/04/25 09:53   Resp 16 06/04/25 09:54   SpO2 99 % 06/04/25 09:53   Vitals shown include unfiled device data.    Electronically Signed By: Gilbert Gómez MD  June 4, 2025  10:14 AM

## 2025-06-11 ENCOUNTER — TELEPHONE (OUTPATIENT)
Dept: SURGERY | Facility: CLINIC | Age: 35
End: 2025-06-11
Payer: COMMERCIAL